# Patient Record
Sex: FEMALE | Race: WHITE | NOT HISPANIC OR LATINO | Employment: STUDENT | URBAN - METROPOLITAN AREA
[De-identification: names, ages, dates, MRNs, and addresses within clinical notes are randomized per-mention and may not be internally consistent; named-entity substitution may affect disease eponyms.]

---
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
## 2019-01-02 DIAGNOSIS — E03.9 HYPOTHYROIDISM, UNSPECIFIED TYPE: ICD-10-CM

## 2019-01-02 RX ORDER — LEVOTHYROXINE SODIUM 0.05 MG/1
50 TABLET ORAL DAILY
Qty: 30 TABLET | Refills: 0 | Status: SHIPPED | OUTPATIENT
Start: 2019-01-02 | End: 2019-02-04 | Stop reason: SDUPTHER

## 2019-01-02 NOTE — TELEPHONE ENCOUNTER
Outgoing call to  patient sent to dr Elisha Christianson  patient had left voicemail regarding script for levothyroxine  completely out of medication and cant find doctor close to her but is looking for pcp in [place]  Told her she needs apt and she said she cant get here  Doesn't drive and its too far
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
## 2019-02-04 ENCOUNTER — OFFICE VISIT (OUTPATIENT)
Dept: FAMILY MEDICINE CLINIC | Facility: CLINIC | Age: 19
End: 2019-02-04
Payer: COMMERCIAL

## 2019-02-04 VITALS
TEMPERATURE: 98.8 F | HEIGHT: 63 IN | OXYGEN SATURATION: 98 % | RESPIRATION RATE: 16 BRPM | DIASTOLIC BLOOD PRESSURE: 80 MMHG | WEIGHT: 157.6 LBS | BODY MASS INDEX: 27.93 KG/M2 | HEART RATE: 75 BPM | SYSTOLIC BLOOD PRESSURE: 120 MMHG

## 2019-02-04 DIAGNOSIS — E03.9 HYPOTHYROIDISM, UNSPECIFIED TYPE: ICD-10-CM

## 2019-02-04 DIAGNOSIS — Z30.011 INITIATION OF OCP (BCP): ICD-10-CM

## 2019-02-04 DIAGNOSIS — J30.2 SEASONAL ALLERGIC RHINITIS, UNSPECIFIED TRIGGER: ICD-10-CM

## 2019-02-04 DIAGNOSIS — J45.20 MILD INTERMITTENT ASTHMA WITHOUT COMPLICATION: Primary | ICD-10-CM

## 2019-02-04 PROBLEM — Z86.59 HISTORY OF PERSONALITY DISORDER: Status: ACTIVE | Noted: 2018-11-14

## 2019-02-04 PROBLEM — F12.90 USES MARIJUANA: Status: ACTIVE | Noted: 2018-11-14

## 2019-02-04 PROBLEM — T14.91XA SUICIDE ATTEMPT (HCC): Status: RESOLVED | Noted: 2018-11-14 | Resolved: 2019-02-04

## 2019-02-04 PROBLEM — F43.10 POST TRAUMATIC STRESS DISORDER (PTSD): Status: ACTIVE | Noted: 2018-11-14

## 2019-02-04 PROBLEM — F12.90 USES MARIJUANA: Status: RESOLVED | Noted: 2018-11-14 | Resolved: 2019-02-04

## 2019-02-04 PROBLEM — F33.2 MAJOR DEPRESSIVE DISORDER, RECURRENT EPISODE, SEVERE WITH MIXED FEATURES (HCC): Status: ACTIVE | Noted: 2018-11-14

## 2019-02-04 PROBLEM — T14.91XA SUICIDE ATTEMPT (HCC): Status: ACTIVE | Noted: 2018-11-14

## 2019-02-04 PROCEDURE — 99204 OFFICE O/P NEW MOD 45 MIN: CPT | Performed by: NURSE PRACTITIONER

## 2019-02-04 PROCEDURE — 3008F BODY MASS INDEX DOCD: CPT | Performed by: NURSE PRACTITIONER

## 2019-02-04 PROCEDURE — 3725F SCREEN DEPRESSION PERFORMED: CPT | Performed by: NURSE PRACTITIONER

## 2019-02-04 RX ORDER — LEVOTHYROXINE SODIUM 0.05 MG/1
50 TABLET ORAL DAILY
Qty: 90 TABLET | Refills: 1 | Status: SHIPPED | OUTPATIENT
Start: 2019-02-04 | End: 2019-03-14

## 2019-02-04 RX ORDER — NORETHINDRONE ACETATE AND ETHINYL ESTRADIOL 1.5-30(21)
1 KIT ORAL DAILY
Qty: 28 TABLET | Refills: 11 | Status: SHIPPED | OUTPATIENT
Start: 2019-02-04 | End: 2019-03-14

## 2019-02-04 RX ORDER — ALBUTEROL SULFATE 90 UG/1
2 AEROSOL, METERED RESPIRATORY (INHALATION) EVERY 6 HOURS PRN
Qty: 1 INHALER | Refills: 0 | Status: SHIPPED | OUTPATIENT
Start: 2019-02-04 | End: 2019-03-14

## 2019-02-26 ENCOUNTER — OFFICE VISIT (OUTPATIENT)
Dept: OBGYN CLINIC | Facility: CLINIC | Age: 19
End: 2019-02-26

## 2019-02-26 VITALS
HEIGHT: 64 IN | HEART RATE: 76 BPM | BODY MASS INDEX: 26.8 KG/M2 | WEIGHT: 157 LBS | DIASTOLIC BLOOD PRESSURE: 80 MMHG | SYSTOLIC BLOOD PRESSURE: 128 MMHG

## 2019-02-26 DIAGNOSIS — Z30.09 BIRTH CONTROL COUNSELING: ICD-10-CM

## 2019-02-26 DIAGNOSIS — Z11.3 SCREEN FOR SEXUALLY TRANSMITTED DISEASES: Primary | ICD-10-CM

## 2019-02-26 DIAGNOSIS — Z30.09 COUNSELING FOR BIRTH CONTROL REGARDING INTRAUTERINE DEVICE (IUD): ICD-10-CM

## 2019-02-26 LAB — SL AMB POCT URINE HCG: NEGATIVE

## 2019-02-26 PROCEDURE — 87491 CHLMYD TRACH DNA AMP PROBE: CPT | Performed by: OBSTETRICS & GYNECOLOGY

## 2019-02-26 PROCEDURE — 87591 N.GONORRHOEAE DNA AMP PROB: CPT | Performed by: OBSTETRICS & GYNECOLOGY

## 2019-02-26 PROCEDURE — 99213 OFFICE O/P EST LOW 20 MIN: CPT | Performed by: OBSTETRICS & GYNECOLOGY

## 2019-02-26 PROCEDURE — 81025 URINE PREGNANCY TEST: CPT | Performed by: OBSTETRICS & GYNECOLOGY

## 2019-02-26 NOTE — PROGRESS NOTES
Assessment        22year old G0 here to discuss IUD placement  Bipolar disorder  Hypothyroidism     Plan   Birth control counseling  -patient has been on OCPs and desires Mirena IUD placement-patient was extensively counseled on the risks of IUD placement as well as abnormal bleeding that could be experienced for up to 1 year  -patient is sexually active in a monogamous relationship  -GC/Chlamydia collected and pending  -UPT negative today  -patient advised to continue OCPs until IUD placement  -patient will follow up for IUD placement once she returns from vacation  -will then follow up for annual visit  Bipolar disorder  -managed by Psychiatry and psychology, which patient follow with  -currently no SI  Hypothyroid  -continue synthroid as managed by PCP      Karie      Madie Canas is a 22 y o  female who presents as a new patient here for birth control counseling  Periods are regular every 28-30 days, lasting 4 days  Dysmenorrhea:present prior to initiation of OCPs, currently on OCPs she does not experience any  Cyclic symptoms include irritability and moodiness  No intermenstrual bleeding, spotting, or discharge  The patient reports that there is not domestic violence in her life  Patient has been on OCPs since she was 14  She has a history of mood instability as well as PMDD prior to her period  She was placed on OCPs which have improved all of her symptoms  She desires a more effective method of contraception and desires the IUD  All of the other birth control options were discussed in detail as well as the major side effects of the IUD  We discussed abnormal bleeding for up to 1 year as well as the risk including but not limited to IUD migration, perforation, infection, ectopic pregnancy  Patient also was informed that this is a progesterone only contraceptive and she is currently on one containing estrogen   We discussed the option of leaving the IUD in place and adding OCPs if she develops a worsening in her mood  Current contraception: OCP (estrogen/progesterone)  History of abnormal Pap smear: no    History of abnormal lipids: no  Menstrual History:  OB History    None        Patient's last menstrual period was 02/19/2019 (exact date)  The following portions of the patient's history were reviewed and updated as appropriate: allergies, current medications, past family history, past medical history, past social history, past surgical history and problem list     Review of Systems  Pertinent items are noted in HPI        Objective      /80   Pulse 76   Ht 5' 4" (1 626 m)   Wt 71 2 kg (157 lb)   LMP 02/19/2019 (Exact Date)   BMI 26 95 kg/m²

## 2019-02-27 LAB
C TRACH DNA SPEC QL NAA+PROBE: NEGATIVE
N GONORRHOEA DNA SPEC QL NAA+PROBE: NEGATIVE

## 2019-03-11 ENCOUNTER — APPOINTMENT (OUTPATIENT)
Dept: LAB | Facility: CLINIC | Age: 19
End: 2019-03-11
Payer: COMMERCIAL

## 2019-03-11 ENCOUNTER — TRANSCRIBE ORDERS (OUTPATIENT)
Dept: LAB | Facility: CLINIC | Age: 19
End: 2019-03-11

## 2019-03-11 DIAGNOSIS — R53.83 FATIGUE, UNSPECIFIED TYPE: ICD-10-CM

## 2019-03-11 DIAGNOSIS — Z13.1 SCREENING FOR DIABETES MELLITUS: ICD-10-CM

## 2019-03-11 DIAGNOSIS — R53.83 FATIGUE, UNSPECIFIED TYPE: Primary | ICD-10-CM

## 2019-03-11 LAB
25(OH)D3 SERPL-MCNC: 31.7 NG/ML (ref 30–100)
ALBUMIN SERPL BCP-MCNC: 3.9 G/DL (ref 3.5–5)
ALP SERPL-CCNC: 35 U/L (ref 46–116)
ALT SERPL W P-5'-P-CCNC: 15 U/L (ref 12–78)
AST SERPL W P-5'-P-CCNC: 20 U/L (ref 5–45)
BILIRUB DIRECT SERPL-MCNC: 0.18 MG/DL (ref 0–0.2)
BILIRUB SERPL-MCNC: 0.66 MG/DL (ref 0.2–1)
PROT SERPL-MCNC: 7.4 G/DL (ref 6.4–8.2)
T3 SERPL-MCNC: 1.2 NG/ML (ref 0.6–1.8)
T4 SERPL-MCNC: 13.6 UG/DL (ref 4.7–13.3)
TSH SERPL DL<=0.05 MIU/L-ACNC: 3.46 UIU/ML (ref 0.46–3.98)

## 2019-03-11 PROCEDURE — 36415 COLL VENOUS BLD VENIPUNCTURE: CPT

## 2019-03-11 PROCEDURE — 84480 ASSAY TRIIODOTHYRONINE (T3): CPT

## 2019-03-11 PROCEDURE — 84443 ASSAY THYROID STIM HORMONE: CPT

## 2019-03-11 PROCEDURE — 82306 VITAMIN D 25 HYDROXY: CPT

## 2019-03-11 PROCEDURE — 80076 HEPATIC FUNCTION PANEL: CPT

## 2019-03-11 PROCEDURE — 82607 VITAMIN B-12: CPT

## 2019-03-11 PROCEDURE — 84436 ASSAY OF TOTAL THYROXINE: CPT

## 2019-03-12 LAB — VIT B12 SERPL-MCNC: 217 PG/ML (ref 100–900)

## 2019-03-14 ENCOUNTER — PROCEDURE VISIT (OUTPATIENT)
Dept: OBGYN CLINIC | Facility: CLINIC | Age: 19
End: 2019-03-14

## 2019-03-14 VITALS
DIASTOLIC BLOOD PRESSURE: 80 MMHG | WEIGHT: 160 LBS | BODY MASS INDEX: 27.31 KG/M2 | HEIGHT: 64 IN | SYSTOLIC BLOOD PRESSURE: 120 MMHG | HEART RATE: 74 BPM

## 2019-03-14 DIAGNOSIS — Z30.430 ENCOUNTER FOR INSERTION OF MIRENA IUD: Primary | ICD-10-CM

## 2019-03-14 PROBLEM — Z30.011 INITIATION OF OCP (BCP): Status: RESOLVED | Noted: 2018-10-23 | Resolved: 2019-03-14

## 2019-03-14 LAB — SL AMB POCT URINE HCG: NORMAL

## 2019-03-14 PROCEDURE — 99213 OFFICE O/P EST LOW 20 MIN: CPT | Performed by: OBSTETRICS & GYNECOLOGY

## 2019-03-14 PROCEDURE — 81025 URINE PREGNANCY TEST: CPT | Performed by: OBSTETRICS & GYNECOLOGY

## 2019-03-14 PROCEDURE — 58300 INSERT INTRAUTERINE DEVICE: CPT | Performed by: OBSTETRICS & GYNECOLOGY

## 2019-03-14 RX ORDER — NORETHINDRONE ACETATE AND ETHINYL ESTRADIOL 1.5-30(21)
1 KIT ORAL EVERY 24 HOURS
COMMUNITY
Start: 2017-12-19 | End: 2019-09-27 | Stop reason: ALTCHOICE

## 2019-03-14 RX ORDER — LEVOTHYROXINE SODIUM 0.05 MG/1
1 TABLET ORAL EVERY 24 HOURS
COMMUNITY
Start: 2018-04-13 | End: 2019-08-08 | Stop reason: SDUPTHER

## 2019-03-14 NOTE — PROGRESS NOTES
Assessment & Plan  20 y o   G0 sexually active here for Mirena IUD insertion  UPT negative  Informed consent obtained  Procedure uncomplicated  Back-up contraception for 2 weeks  RTC in 3 weeks for string check  Problem List Items Addressed This Visit     None      Visit Diagnoses     Encounter for insertion of mirena IUD    -  Primary    Relevant Medications    levonorgestrel (MIRENA) IUD 20 mcg/day (Start on 3/14/2019  4:30 PM)    Other Relevant Orders    POCT urine HCG (Completed)        ____________________________________________________________  Subjective  She is without complaint    Desires IUD for contraception  Sexually active in monogamous relationship  Denies STD's  Denies any smoking    Objective  /80 (BP Location: Left arm, Patient Position: Sitting, Cuff Size: Standard)   Pulse 74   Ht 5' 4" (1 626 m)   Wt 72 6 kg (160 lb)   LMP 02/19/2019 (Exact Date)   BMI 27 46 kg/m²      Patient's Active Problem List  Patient Active Problem List   Diagnosis    Acne    Allergic rhinitis    Asthma    Primary hypothyroidism    History of personality disorder    Major depressive disorder, recurrent episode, severe with mixed features ([address] )    Post traumatic stress disorder (PTSD)    Counseling for birth control regarding intrauterine device (IUD)     Iud insertions  Date/Time: 3/14/2019 4:24 PM  Performed by: Melody Gomez MD  Authorized by: Melody Gomez MD     Consent:     Consent obtained:  Written and verbal    Consent given by:  Patient    Procedure risks and benefits discussed: yes      Patient questions answered: yes      Patient agrees, verbalizes understanding, and wants to proceed: yes    Procedure:     Pelvic exam performed: yes      Negative urine pregnancy test: yes      Cervix cleaned and prepped: yes      Speculum placed in vagina: yes      Tenaculum applied to cervix: yes      Uterus sounded: yes      Uterus sound depth (cm):  7    IUD inserted with no complications: yes IUD type:  Mirena  Post-procedure:     Patient tolerated procedure well: yes      Patient will follow up after next period: yes

## 2019-04-11 ENCOUNTER — OFFICE VISIT (OUTPATIENT)
Dept: OBGYN CLINIC | Facility: CLINIC | Age: 19
End: 2019-04-11

## 2019-04-11 VITALS
WEIGHT: 155 LBS | BODY MASS INDEX: 26.46 KG/M2 | DIASTOLIC BLOOD PRESSURE: 67 MMHG | HEART RATE: 61 BPM | HEIGHT: 64 IN | SYSTOLIC BLOOD PRESSURE: 117 MMHG

## 2019-04-11 DIAGNOSIS — Z30.431 IUD CHECK UP: Primary | ICD-10-CM

## 2019-04-11 PROCEDURE — 99213 OFFICE O/P EST LOW 20 MIN: CPT | Performed by: OBSTETRICS & GYNECOLOGY

## 2019-04-11 RX ORDER — LAMOTRIGINE 25 MG/1
25 TABLET ORAL DAILY
COMMUNITY
End: 2019-07-10

## 2019-05-29 ENCOUNTER — TELEPHONE (OUTPATIENT)
Dept: BEHAVIORAL/MENTAL HEALTH CLINIC | Facility: CLINIC | Age: 19
End: 2019-05-29
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
## 2019-07-10 ENCOUNTER — OFFICE VISIT (OUTPATIENT)
Dept: PSYCHIATRY | Facility: CLINIC | Age: 19
End: 2019-07-10
Payer: COMMERCIAL

## 2019-07-10 VITALS
HEART RATE: 76 BPM | DIASTOLIC BLOOD PRESSURE: 73 MMHG | WEIGHT: 150 LBS | SYSTOLIC BLOOD PRESSURE: 105 MMHG | BODY MASS INDEX: 25.75 KG/M2

## 2019-07-10 DIAGNOSIS — F90.2 ADHD (ATTENTION DEFICIT HYPERACTIVITY DISORDER), COMBINED TYPE: Primary | ICD-10-CM

## 2019-07-10 PROBLEM — Z30.09 COUNSELING FOR BIRTH CONTROL REGARDING INTRAUTERINE DEVICE (IUD): Status: RESOLVED | Noted: 2019-02-26 | Resolved: 2019-07-10

## 2019-07-10 PROCEDURE — 90791 PSYCH DIAGNOSTIC EVALUATION: CPT | Performed by: NURSE PRACTITIONER

## 2019-07-10 RX ORDER — DEXTROAMPHETAMINE SACCHARATE, AMPHETAMINE ASPARTATE MONOHYDRATE, DEXTROAMPHETAMINE SULFATE AND AMPHETAMINE SULFATE 1.25; 1.25; 1.25; 1.25 MG/1; MG/1; MG/1; MG/1
5 CAPSULE, EXTENDED RELEASE ORAL EVERY MORNING
Qty: 60 CAPSULE | Refills: 0 | Status: SHIPPED | OUTPATIENT
Start: 2019-07-10 | End: 2019-11-06

## 2019-07-10 RX ORDER — LAMOTRIGINE 100 MG/1
100 TABLET ORAL DAILY
COMMUNITY
Start: 2019-05-14 | End: 2019-07-10

## 2019-08-08 DIAGNOSIS — E03.9 PRIMARY HYPOTHYROIDISM: Primary | ICD-10-CM

## 2019-08-08 RX ORDER — LEVOTHYROXINE SODIUM 0.05 MG/1
50 TABLET ORAL DAILY
Qty: 90 TABLET | Refills: 1 | Status: SHIPPED | OUTPATIENT
Start: 2019-08-08 | End: 2020-03-05

## 2019-08-19 ENCOUNTER — TELEPHONE (OUTPATIENT)
Dept: PSYCHIATRY | Facility: CLINIC | Age: 19
End: 2019-08-19

## 2019-08-20 ENCOUNTER — DOCUMENTATION (OUTPATIENT)
Dept: PSYCHIATRY | Facility: CLINIC | Age: 19
End: 2019-08-20

## 2019-08-20 NOTE — PROGRESS NOTES
Treatment Plan not completed within required time limits due to: Vandana Cho  cancelled appointment  on 8/21/2019

## 2019-09-09 NOTE — BH TREATMENT PLAN
TREATMENT PLAN (Medication Management Only)        [facility]    Name and Date of Birth:  Vandana Cho 20 y o  2000  Date of Treatment Plan: September 10, 2019  Diagnosis/Diagnoses:    1  ADHD (attention deficit hyperactivity disorder), combined type      Strengths/Personal Resources for Self-Care: taking medications as prescribed, ability to adapt to life changes, ability to communicate needs, ability to communicate well, ability to listen, ability to reason, ability to understand psychiatric illness, general fund of knowledge, good physical health, good understanding of illness, independence, motivation for treatment, willingness to work on problems  Area/Areas of need (in own words): ADHD symptoms  1  Long Term Goal: continue improvement in ADHD symptoms  Target Date: 2 months - 11/9/2019  Person/Persons responsible for completion of goal: Jami Lozano  2  Short Term Objective (s) - How will we reach this goal?:   A  Provider new recommended medication/dosage changes and/or continue medication(s): continue current medications as prescribed  B  Attend medication management appointments regularly  C  Take psychiatric medications responsibly  Target Date: 3 months - 12/9/2019  Person/Persons Responsible for Completion of Goal: Jami Lozano  Progress Towards Goals: continuing treatment  Treatment Modality: medication management every 6 weeks, medication education at every visit  Review due 90 to 120 days from date of this plan: 3 months - 12/9/2019  Expected length of service: ongoing treatment  My Physician/PA/NP and I have developed this plan together and I agree to work on the goals and objectives  I understand the treatment goals that were developed for my treatment
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
## 2019-09-10 ENCOUNTER — OFFICE VISIT (OUTPATIENT)
Dept: PSYCHIATRY | Facility: CLINIC | Age: 19
End: 2019-09-10

## 2019-09-10 DIAGNOSIS — F90.2 ADHD (ATTENTION DEFICIT HYPERACTIVITY DISORDER), COMBINED TYPE: Primary | ICD-10-CM

## 2019-09-10 PROCEDURE — 99213 OFFICE O/P EST LOW 20 MIN: CPT | Performed by: NURSE PRACTITIONER

## 2019-09-10 RX ORDER — DEXTROAMPHETAMINE SACCHARATE, AMPHETAMINE ASPARTATE MONOHYDRATE, DEXTROAMPHETAMINE SULFATE AND AMPHETAMINE SULFATE 2.5; 2.5; 2.5; 2.5 MG/1; MG/1; MG/1; MG/1
10 CAPSULE, EXTENDED RELEASE ORAL EVERY MORNING
Qty: 30 CAPSULE | Refills: 0 | Status: SHIPPED | OUTPATIENT
Start: 2019-09-10 | End: 2019-11-06 | Stop reason: SDUPTHER

## 2019-09-27 ENCOUNTER — OFFICE VISIT (OUTPATIENT)
Dept: FAMILY MEDICINE CLINIC | Facility: CLINIC | Age: 19
End: 2019-09-27
Payer: COMMERCIAL

## 2019-09-27 VITALS
RESPIRATION RATE: 16 BRPM | WEIGHT: 141 LBS | HEART RATE: 96 BPM | BODY MASS INDEX: 24.07 KG/M2 | OXYGEN SATURATION: 99 % | TEMPERATURE: 98.4 F | SYSTOLIC BLOOD PRESSURE: 110 MMHG | HEIGHT: 64 IN | DIASTOLIC BLOOD PRESSURE: 70 MMHG

## 2019-09-27 DIAGNOSIS — L70.0 ACNE VULGARIS: Primary | ICD-10-CM

## 2019-09-27 DIAGNOSIS — N93.0 BLEEDING AFTER INTERCOURSE: ICD-10-CM

## 2019-09-27 PROCEDURE — 99214 OFFICE O/P EST MOD 30 MIN: CPT | Performed by: FAMILY MEDICINE

## 2019-09-27 RX ORDER — ERYTHROMYCIN AND BENZOYL PEROXIDE 30; 50 MG/G; MG/G
GEL TOPICAL 2 TIMES DAILY
Qty: 23.3 G | Refills: 1 | Status: SHIPPED | OUTPATIENT
Start: 2019-09-27 | End: 2020-02-19 | Stop reason: ALTCHOICE

## 2019-09-27 RX ORDER — CLINDAMYCIN AND BENZOYL PEROXIDE 10; 50 MG/G; MG/G
GEL TOPICAL 2 TIMES DAILY
Qty: 25 G | Refills: 0 | Status: SHIPPED | OUTPATIENT
Start: 2019-09-27 | End: 2019-09-27 | Stop reason: CLARIF

## 2019-09-27 NOTE — PROGRESS NOTES
Assessment/Plan:    Acne  Will start topical acne treatment  Will also refer patient to dermatology, as she may be a candidate for Accutane treatment  Bleeding after intercourse  Recommend patient follow-up with her gynecologist for further evaluation and management of this issue  Diagnoses and all orders for this visit:    Acne vulgaris  -     Ambulatory referral to Dermatology; Future  -     Discontinue: clindamycin-benzoyl peroxide (BENZACLIN) gel; Apply topically 2 (two) times a day  -     benzoyl peroxide-erythromycin (BENZAMYCIN) gel; Apply topically 2 (two) times a day    Bleeding after intercourse          Subjective:      Patient ID: Karl Wooten is a 20 y o  female  Oil face with cystic acne--has been an issue since she was a teen; uses OTC acne treatment  Ingrown hairs in pubic area--tried shaving and waxing without improvement  Been on Retin-A in the past; saw dermatology as a teen; not on Accutane  Reports she does have trouble with acne on her back, but that has improved some since she has gotten older  Has IUD in place; notes to have bleeding after intercourse; happening for years; grandmother with history of uterine cancer      The following portions of the patient's history were reviewed and updated as appropriate: allergies, current medications, past family history, past medical history, past social history, past surgical history and problem list     Review of Systems   Constitutional: Negative for fever  Genitourinary: Positive for vaginal bleeding (after intercourse)  Skin: Positive for rash (acne)  Negative for wound  Objective:      /70 (BP Location: Left arm, Patient Position: Sitting, Cuff Size: Adult)   Pulse 96   Temp 98 4 °F (36 9 °C) (Oral)   Resp 16   Ht 5' 4" (1 626 m)   Wt 64 kg (141 lb)   SpO2 99%   BMI 24 20 kg/m²          Physical Exam   Constitutional: She is oriented to person, place, and time  She appears well-developed and well-nourished  She is cooperative  HENT:   Head: Normocephalic and atraumatic  Right Ear: Hearing and external ear normal    Left Ear: Hearing and external ear normal    Eyes: Conjunctivae and lids are normal    Cardiovascular: Normal rate  Pulmonary/Chest: Effort normal    Musculoskeletal: Normal range of motion  Neurological: She is alert and oriented to person, place, and time  She exhibits normal muscle tone  Gait normal    Skin: Skin is warm, dry and intact  Cystic acne noted on forehead; multiple open comedones noted on nose and chin; erythema around hair follicles in inguinal area; no current pustules noted   Psychiatric: She has a normal mood and affect  Her speech is normal and behavior is normal    Nursing note and vitals reviewed  Statement Selected

## 2019-09-27 NOTE — ASSESSMENT & PLAN NOTE
Will start topical acne treatment  Will also refer patient to dermatology, as she may be a candidate for Accutane treatment

## 2019-09-27 NOTE — PATIENT INSTRUCTIONS
Acne   AMBULATORY CARE:   Acne  is a skin condition that is common in adolescents  It usually gets better over time, and is usually gone by about age 22  Acne can continue into adulthood for some people  Different types of acne:  Acne most often appears on the face, neck, upper chest, back, and upper arms  · Whiteheads  are closed, white bumps that form when the pore is completely blocked  · Blackheads  are tiny, dark spots that form when the pore is blocked but stays open  · Pimples  are inflamed bumps that contain pus  They are often caused by clogged pores  Pimples develop when whiteheads or blackheads get infected  · Cystic acne  is made up of large inflamed nodules or cysts that contain pus  They look like large pimples and form deep inside the skin  They may cause pain and scars  Contact your healthcare provider if:   · You use retinoid medicine and you think you might be pregnant  · You use retinoid medicine and begin to have mood swings or personality changes  · You feel depressed  · You have a fever and inflammation of your skin  · Your acne does not get better, even after treatment  · You have questions or concerns about your condition or care  Treatment  depends on how severe your acne is  Your healthcare provider may recommend any of the following:  · Over-the-counter acne medicines  with benzoyl peroxide and salicylic acid may help to treat mild acne  They are available in the form of gels, lotions, creams, pads, or soaps  It may take several weeks for you to see an improvement  Follow the directions on the medicine label  Do not use more than directed  This medicine can cause dry and red skin if you use too much  · Prescription medicines  may be needed if over-the-counter medicines do not help after 2 months  You may need to take more than one kind of medicine to treat your acne  This may include antibiotics that kill bacteria and help decrease swelling   A type of prescription acne medicine called retinoids may cause serious birth defects  Do not  use this medicine if you are pregnant or may become pregnant  · Light therapy  may help decrease your acne  Ask your healthcare provider for more information about light therapy  Manage or prevent acne:   · Wash your face 2 times a day  with a gentle cleanser  This helps decrease oil buildup that leads to acne  Also wash your face if you have been sweating a lot, such as after exercise  · Use oil-free products  This includes sunscreen, moisturizers, and cosmetics  Hair products should also be oil-free  · Regularly wash your hair  to decrease oil  Oily hair that touches your face can increase acne  · Avoid touching your face  as much as possible  Do not pick, squeeze, or pop your pimples  This can make your acne worse because your hands contain oil  It can also cause scars to form on your face  · Avoid things that rub against your skin  as much as possible  This includes hats, helmets, and backpacks  Follow up with your healthcare provider as directed:  Write down your questions so you remember to ask them during your visits  © 2017 [employer]  Information is for End User's use only and may not be sold, redistributed or otherwise used for commercial purposes  All illustrations and images included in CareNotes® are the copyrighted property of [employer]  or [employer]  The above information is an  only  It is not intended as medical advice for individual conditions or treatments  Talk to your doctor, nurse or pharmacist before following any medical regimen to see if it is safe and effective for you

## 2019-09-27 NOTE — ASSESSMENT & PLAN NOTE
Recommend patient follow-up with her gynecologist for further evaluation and management of this issue

## 2019-10-04 ENCOUNTER — OFFICE VISIT (OUTPATIENT)
Dept: OBGYN CLINIC | Facility: CLINIC | Age: 19
End: 2019-10-04
Payer: COMMERCIAL

## 2019-10-04 VITALS
WEIGHT: 144 LBS | SYSTOLIC BLOOD PRESSURE: 104 MMHG | DIASTOLIC BLOOD PRESSURE: 66 MMHG | HEIGHT: 64 IN | BODY MASS INDEX: 24.59 KG/M2

## 2019-10-04 DIAGNOSIS — N93.0 PCB (POST COITAL BLEEDING): Primary | ICD-10-CM

## 2019-10-04 DIAGNOSIS — Z72.51 HIGH RISK HETEROSEXUAL BEHAVIOR: ICD-10-CM

## 2019-10-04 PROCEDURE — 99203 OFFICE O/P NEW LOW 30 MIN: CPT | Performed by: NURSE PRACTITIONER

## 2019-10-04 NOTE — PROGRESS NOTES
Hitesh Oleary 53-year-old nulliparous female  Her method of contraception is a Mirena IUD which was inserted in April of this year  Patient complains of bleeding with intercourse (spotting) shortly after intercourse which usually occurs once when she wipes  This has been occurring since she became sexually at age 12  She is properly lubricated  Denies pain with intercourse  Her menses are absent due to Mirena IUD  Sexual History: 30 total partners in her lifetime  One partner within this past year  Does not always use condoms  ROS:  As indicated in HPI  All other ROS negative  Physical Exam   Constitutional: Vital signs are normal  She appears well-developed and well-nourished  Genitourinary: Vagina normal and uterus normal  Pelvic exam was performed with patient supine  There is no rash, tenderness, lesion or injury on the right labia  There is no rash, tenderness, lesion or injury on the left labia  Right adnexum does not display mass, does not display tenderness and does not display fullness  Left adnexum does not display mass, does not display tenderness and does not display fullness  Cervix is nulliparous  Cervix exhibits friability and visible IUD strings  Cervix does not exhibit motion tenderness, lesion, discharge, polyp or nabothian cyst      Uterus is anteverted  HENT:   Head: Normocephalic and atraumatic  Neck: Neck supple  Neurological: She is alert  Skin: Skin is warm  Nursing note and vitals reviewed  Marisela Guy was seen today for vaginal bleeding  Diagnoses and all orders for this visit:    PCB (post coital bleeding)  -     US pelvis complete w transvaginal; Future    High risk heterosexual behavior      I explained that the bleeding is most likely occuring due to the friability of her cervix which causes it to easily bleed  I also discussed with the patient the importance of establishing with one gyn provider for continuity of care   Patient has been to a total of 4 different Gyn offices within our network including our office today, all within the last 9 months  She verbalized understanding  She is to follow-up in March after her 24st birthday for her first pap smear or sooner if she needs  I will call her with the results of her pelvic US

## 2019-10-11 ENCOUNTER — HOSPITAL ENCOUNTER (OUTPATIENT)
Dept: RADIOLOGY | Facility: HOSPITAL | Age: 19
Discharge: HOME | End: 2019-10-11
Payer: COMMERCIAL

## 2019-10-11 DIAGNOSIS — N93.0 PCB (POST COITAL BLEEDING): ICD-10-CM

## 2019-10-11 PROCEDURE — 76856 US EXAM PELVIC COMPLETE: CPT

## 2019-10-11 PROCEDURE — 76830 TRANSVAGINAL US NON-OB: CPT

## 2019-10-15 ENCOUNTER — TELEPHONE (OUTPATIENT)
Dept: OBGYN CLINIC | Facility: CLINIC | Age: 19
End: 2019-10-15

## 2019-10-16 NOTE — TELEPHONE ENCOUNTER
Pt informed pelvic U/S results 10/11/19 - wnl, IUD in place  Pt states she has persistent post-coital bleeding for "years"  She would like to speak with you
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
## 2019-10-22 ENCOUNTER — OFFICE VISIT (OUTPATIENT)
Dept: PSYCHIATRY | Facility: CLINIC | Age: 19
End: 2019-10-22
Payer: COMMERCIAL

## 2019-10-22 VITALS
HEART RATE: 92 BPM | WEIGHT: 141 LBS | SYSTOLIC BLOOD PRESSURE: 117 MMHG | DIASTOLIC BLOOD PRESSURE: 81 MMHG | BODY MASS INDEX: 24.2 KG/M2

## 2019-10-22 DIAGNOSIS — F29 PSYCHOSIS, UNSPECIFIED PSYCHOSIS TYPE (HCC): ICD-10-CM

## 2019-10-22 DIAGNOSIS — F90.1 ADHD, PREDOMINANTLY HYPERACTIVE TYPE: Primary | ICD-10-CM

## 2019-10-22 PROCEDURE — 99213 OFFICE O/P EST LOW 20 MIN: CPT | Performed by: NURSE PRACTITIONER

## 2019-10-22 NOTE — BH TREATMENT PLAN
TREATMENT PLAN (Medication Management Only)         [facility]     Name and Date of Birth:  Marvel Hilton 20 y o  2000  Date of Treatment Plan: October 22, 2019  Diagnosis/Diagnoses:    1  ADHD (attention deficit hyperactivity disorder), combined type       Strengths/Personal Resources for Self-Care: taking medications as prescribed, ability to adapt to life changes, ability to communicate needs, ability to communicate well, ability to listen, ability to reason, ability to understand psychiatric illness, general fund of knowledge, good physical health, good understanding of illness, independence, motivation for treatment, willingness to work on problems  Area/Areas of need (in own words): ADHD symptoms  1  Long Term Goal: continue improvement in ADHD symptoms  Target Date: 2 months - 12/21/2019  Person/Persons responsible for completion of goal: Claudia Mon  2  Short Term Objective (s) - How will we reach this goal?:   A  Provider new recommended medication/dosage changes and/or continue medication(s): continue current medications as prescribed  B  Attend medication management appointments regularly  C  Take psychiatric medications responsibly  Target Date: 3 months - 1/21/2020  Person/Persons Responsible for Completion of Goal: Claudia Mon  Progress Towards Goals: continuing treatment  Treatment Modality: medication management every 6 weeks, medication education at every visit  Review due 90 to 120 days from date of this plan: 3 months - 1/21/2020  Expected length of service: ongoing treatment  My Physician/PA/NP and I have developed this plan together and I agree to work on the goals and objectives  I understand the treatment goals that were developed for my treatment

## 2019-11-04 NOTE — TELEPHONE ENCOUNTER
Patient currently on Adderall 10 mg and 5 mg  Since last visit she states she is still having anxiety  She sent you an email unsure if you got it

## 2019-11-05 NOTE — TELEPHONE ENCOUNTER
Amphet-dextroamphet XR 10 mg ordered daily, #30 and XR 5 mg also ordered daily, but #60  Is there a reason it was prescribed for #60? Typically we request authorization for 30 days at a time

## 2019-11-06 ENCOUNTER — TELEPHONE (OUTPATIENT)
Dept: BEHAVIORAL/MENTAL HEALTH CLINIC | Facility: CLINIC | Age: 19
End: 2019-11-06

## 2019-11-06 ENCOUNTER — TELEPHONE (OUTPATIENT)
Dept: PSYCHIATRY | Facility: CLINIC | Age: 19
End: 2019-11-06

## 2019-11-06 DIAGNOSIS — L70.0 ACNE VULGARIS: ICD-10-CM

## 2019-11-06 DIAGNOSIS — F90.2 ADHD (ATTENTION DEFICIT HYPERACTIVITY DISORDER), COMBINED TYPE: ICD-10-CM

## 2019-11-06 RX ORDER — ERYTHROMYCIN AND BENZOYL PEROXIDE 30; 50 MG/G; MG/G
GEL TOPICAL 2 TIMES DAILY
Qty: 23.3 G | Refills: 1 | Status: CANCELLED | OUTPATIENT
Start: 2019-11-06

## 2019-11-06 RX ORDER — DEXTROAMPHETAMINE SACCHARATE, AMPHETAMINE ASPARTATE MONOHYDRATE, DEXTROAMPHETAMINE SULFATE AND AMPHETAMINE SULFATE 2.5; 2.5; 2.5; 2.5 MG/1; MG/1; MG/1; MG/1
10 CAPSULE, EXTENDED RELEASE ORAL EVERY MORNING
Qty: 30 CAPSULE | Refills: 0 | Status: CANCELLED | OUTPATIENT
Start: 2019-11-06 | End: 2019-12-06

## 2019-11-06 RX ORDER — DEXTROAMPHETAMINE SACCHARATE, AMPHETAMINE ASPARTATE MONOHYDRATE, DEXTROAMPHETAMINE SULFATE AND AMPHETAMINE SULFATE 1.25; 1.25; 1.25; 1.25 MG/1; MG/1; MG/1; MG/1
5 CAPSULE, EXTENDED RELEASE ORAL EVERY MORNING
Qty: 60 CAPSULE | Refills: 0 | Status: CANCELLED | OUTPATIENT
Start: 2019-11-06 | End: 2020-01-05

## 2019-11-06 RX ORDER — DEXTROAMPHETAMINE SACCHARATE, AMPHETAMINE ASPARTATE MONOHYDRATE, DEXTROAMPHETAMINE SULFATE AND AMPHETAMINE SULFATE 2.5; 2.5; 2.5; 2.5 MG/1; MG/1; MG/1; MG/1
10 CAPSULE, EXTENDED RELEASE ORAL EVERY MORNING
Qty: 30 CAPSULE | Refills: 0 | Status: SHIPPED | OUTPATIENT
Start: 2019-11-06 | End: 2019-12-26 | Stop reason: SDUPTHER

## 2019-11-06 RX ORDER — DEXTROAMPHETAMINE SACCHARATE, AMPHETAMINE ASPARTATE MONOHYDRATE, DEXTROAMPHETAMINE SULFATE AND AMPHETAMINE SULFATE 1.25; 1.25; 1.25; 1.25 MG/1; MG/1; MG/1; MG/1
5 CAPSULE, EXTENDED RELEASE ORAL EVERY MORNING
Qty: 30 CAPSULE | Refills: 0 | Status: SHIPPED | OUTPATIENT
Start: 2019-11-06 | End: 2019-11-26

## 2019-11-06 NOTE — TELEPHONE ENCOUNTER
Patient has a question on her medication  She received 2 prescriptions,  Is she taking both  Adderall 10mg and Adderall 5mg  She needs directions on how to take these medication

## 2019-11-06 NOTE — TELEPHONE ENCOUNTER
Sudhakar sent to wrong physician   Patient called to check on prior authorization for Rexulti and also requested  Her Adderall be refilled also

## 2019-11-08 NOTE — TELEPHONE ENCOUNTER
Following up on this message: In more recent Telephone Encounters, Marco is questioning receiving prescriptions for both strengths of Adderall  Could you please clarify?

## 2019-11-11 ENCOUNTER — TELEPHONE (OUTPATIENT)
Dept: PSYCHIATRY | Facility: CLINIC | Age: 19
End: 2019-11-11

## 2019-11-11 NOTE — TELEPHONE ENCOUNTER
Routing note from Bernard to [facility] on 11/10/19  "She is to take 10mg daily  The 5mg was ordered by mistake but she can use them also and just take 2 tablets "  Thanks,   Jerrell Nuñez has been trying to reach [facility] since 11/10/19 but she does not answer and her voice mail has been full  Nursing again called Marco this morning and still no answer and not able to leave VM as message stating it is full  Nursing will continue to try to reach Marco  Pollie Meigs and Sandra,  If Marco calls again, would you please inform her that Nursing has been trying to reach her but she is not answering her phone and her VM is full  Please ask if there is an alternate phone number we can use to contact her, or if she can make room in her VM  Thank you, Lacy Joy's and Sandra's information

## 2019-11-12 ENCOUNTER — TELEPHONE (OUTPATIENT)
Dept: BEHAVIORAL/MENTAL HEALTH CLINIC | Facility: CLINIC | Age: 19
End: 2019-11-12

## 2019-11-12 NOTE — TELEPHONE ENCOUNTER
Patient called once again and left another message  I tried her phone and once again mailbox is full so unable to leave message  I emailed patient with the email provided in her chart and gave her the nurses extensions

## 2019-11-12 NOTE — TELEPHONE ENCOUNTER
Prior authorization for Rexulti 0 5 mg daily submitted to Rip Chambers via [employer]

## 2019-11-12 NOTE — TELEPHONE ENCOUNTER
Received a call from Berto Schmidt at [facility]:  Isabel Joseph called there to have them contact the office  Nursing has been trying to return Doris's calls since 11/06/19, however her voice mail box has been full  I called her sister, Nael Segura (emergency contact): The nurse at [facility] office calling; trying to reach Isabel Joseph since last week; her mail box is full; ask her to call and gave nursing numbers

## 2019-11-13 NOTE — TELEPHONE ENCOUNTER
Received a faxed denial for Rexulti - needs to have tried one of their formulary medications  I reviewed their list and Silverio Corey reports taking Seroquel while living [place] in 2016  She said the medication was ineffective, she got suicidal and was hospitalized  Will resubmit with additional information

## 2019-11-13 NOTE — TELEPHONE ENCOUNTER
Patient left a message to let you know that she has cleared her voicemail box   Would you like me to call, or do you want to?

## 2019-11-13 NOTE — TELEPHONE ENCOUNTER
Radha Garzon returned the call and left a message - mail box has been cleared and can leave a message  I was able to speak with Radha Garzon - she was appreciative of nursing calling her sister  I reviewed a P A has been submitted and waiting for a decision  We reviewed dosing of Adderall and she understands the dose is 10 mg  She related she would be able to fill the 5 mg, 2 caps daily next month  I reviewed the confusion changing doses each month presents and asked that she call the nursing number for refills and the correct dose could be sent (providers can send 3 refills for a stimulant at one time)  Will notify Radha Garzon of insurance's decision

## 2019-11-14 ENCOUNTER — TELEPHONE (OUTPATIENT)
Dept: PSYCHIATRY | Facility: CLINIC | Age: 19
End: 2019-11-14

## 2019-11-14 NOTE — TELEPHONE ENCOUNTER
Pharmacy and patient made aware of prior authorization approval for Rexulti 0 5 mg 11/14/2019 through 11/14/2020

## 2019-11-19 ENCOUNTER — TELEPHONE (OUTPATIENT)
Dept: PSYCHIATRY | Facility: CLINIC | Age: 19
End: 2019-11-19

## 2019-11-19 NOTE — TELEPHONE ENCOUNTER
Elijah Aquino RN from [facility] called to let you know [facility] is currently hospitalized and was calling to make a follow up  In checking your schedule she was already scheduled for 11/26  She will be coming in for this appointment

## 2019-11-26 ENCOUNTER — TELEPHONE (OUTPATIENT)
Dept: PSYCHIATRY | Facility: CLINIC | Age: 19
End: 2019-11-26

## 2019-11-26 ENCOUNTER — OFFICE VISIT (OUTPATIENT)
Dept: PSYCHIATRY | Facility: CLINIC | Age: 19
End: 2019-11-26
Payer: COMMERCIAL

## 2019-11-26 DIAGNOSIS — F29 PSYCHOSIS, UNSPECIFIED PSYCHOSIS TYPE (HCC): Primary | ICD-10-CM

## 2019-11-26 PROCEDURE — 99213 OFFICE O/P EST LOW 20 MIN: CPT | Performed by: NURSE PRACTITIONER
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
## 2019-11-26 NOTE — TELEPHONE ENCOUNTER
Fax received from [facility] requesting a prior Kendall Birdy be done for Doris's new dose of Rexulti 2 mg  Previous dose was 0 5 mg and P A  Was approved for this dose  Dose changed by Charles Montez at [facility] apptmnt today  For Josephine's information

## 2019-11-29 ENCOUNTER — TELEPHONE (OUTPATIENT)
Dept: PSYCHIATRY | Facility: CLINIC | Age: 19
End: 2019-11-29

## 2019-11-29 NOTE — TELEPHONE ENCOUNTER
Nursing sent prior authorization for Rexulti 2 mg to [facility] via [employer]  Will await outcome of prior authorization

## 2019-11-29 NOTE — TELEPHONE ENCOUNTER
Nursing received approval prior authorization approval for Rexulti, message left for patient and pharmacy made aware

## 2019-12-02 ENCOUNTER — OFFICE VISIT (OUTPATIENT)
Dept: FAMILY MEDICINE CLINIC | Facility: CLINIC | Age: 19
End: 2019-12-02
Payer: COMMERCIAL

## 2019-12-02 ENCOUNTER — TELEPHONE (OUTPATIENT)
Dept: PSYCHIATRY | Facility: CLINIC | Age: 19
End: 2019-12-02

## 2019-12-02 VITALS
WEIGHT: 144 LBS | OXYGEN SATURATION: 98 % | HEART RATE: 93 BPM | RESPIRATION RATE: 17 BRPM | DIASTOLIC BLOOD PRESSURE: 78 MMHG | TEMPERATURE: 98.4 F | BODY MASS INDEX: 24.59 KG/M2 | HEIGHT: 64 IN | SYSTOLIC BLOOD PRESSURE: 120 MMHG

## 2019-12-02 DIAGNOSIS — J06.9 VIRAL UPPER RESPIRATORY TRACT INFECTION: Primary | ICD-10-CM

## 2019-12-02 DIAGNOSIS — Z23 ENCOUNTER FOR VACCINATION: ICD-10-CM

## 2019-12-02 DIAGNOSIS — J02.9 SORE THROAT: ICD-10-CM

## 2019-12-02 LAB — S PYO AG THROAT QL: NEGATIVE

## 2019-12-02 PROCEDURE — 90471 IMMUNIZATION ADMIN: CPT

## 2019-12-02 PROCEDURE — 90732 PPSV23 VACC 2 YRS+ SUBQ/IM: CPT

## 2019-12-02 PROCEDURE — 3008F BODY MASS INDEX DOCD: CPT | Performed by: FAMILY MEDICINE

## 2019-12-02 PROCEDURE — 87880 STREP A ASSAY W/OPTIC: CPT | Performed by: FAMILY MEDICINE

## 2019-12-02 PROCEDURE — 4004F PT TOBACCO SCREEN RCVD TLK: CPT | Performed by: FAMILY MEDICINE

## 2019-12-02 PROCEDURE — 99213 OFFICE O/P EST LOW 20 MIN: CPT | Performed by: FAMILY MEDICINE

## 2019-12-02 PROCEDURE — 90472 IMMUNIZATION ADMIN EACH ADD: CPT

## 2019-12-02 PROCEDURE — 90686 IIV4 VACC NO PRSV 0.5 ML IM: CPT

## 2019-12-02 RX ORDER — BENZONATATE 100 MG/1
100 CAPSULE ORAL 3 TIMES DAILY PRN
Qty: 20 CAPSULE | Refills: 0 | Status: SHIPPED | OUTPATIENT
Start: 2019-12-02 | End: 2020-02-06 | Stop reason: ALTCHOICE

## 2019-12-02 RX ORDER — FLUTICASONE PROPIONATE 50 MCG
1 SPRAY, SUSPENSION (ML) NASAL DAILY
Qty: 1 BOTTLE | Refills: 2 | Status: SHIPPED | OUTPATIENT
Start: 2019-12-02

## 2019-12-02 RX ORDER — BROMPHENIRAMINE MALEATE, PSEUDOEPHEDRINE HYDROCHLORIDE, AND DEXTROMETHORPHAN HYDROBROMIDE 2; 30; 10 MG/5ML; MG/5ML; MG/5ML
10 SYRUP ORAL 4 TIMES DAILY PRN
Qty: 180 ML | Refills: 0 | Status: SHIPPED | OUTPATIENT
Start: 2019-12-02 | End: 2020-02-06 | Stop reason: ALTCHOICE

## 2019-12-02 RX ORDER — AZITHROMYCIN 250 MG/1
250 TABLET, FILM COATED ORAL EVERY 24 HOURS
Qty: 6 TABLET | Refills: 0 | Status: SHIPPED | OUTPATIENT
Start: 2019-12-02 | End: 2019-12-07

## 2019-12-02 NOTE — PROGRESS NOTES
Assessment/Plan:    No problem-specific Assessment & Plan notes found for this encounter  Diagnoses and all orders for this visit:    Viral upper respiratory tract infection  -     brompheniramine-pseudoephedrine-DM 30-2-10 MG/5ML syrup; Take 10 mL by mouth 4 (four) times a day as needed for allergies  -     benzonatate (TESSALON PERLES) 100 mg capsule; Take 1 capsule (100 mg total) by mouth 3 (three) times a day as needed for cough  -     fluticasone (FLONASE) 50 mcg/act nasal spray; 1 spray into each nostril daily  -     azithromycin (ZITHROMAX) 250 mg tablet; Take 1 tablet (250 mg total) by mouth every 24 hours for 5 days Take 500mg on day 1, then 250mg daily for 4 days    Sore throat  -     POCT rapid strepA    Encounter for vaccination  -     influenza vaccine, 2823-6061, quadrivalent, 0 5 mL, preservative-free, for adult and pediatric patients 6 mos+ (AFLURIA, FLUARIX, FLULAVAL, FLUZONE)  -     PNEUMOCOCCAL POLYSACCHARIDE VACCINE 23-VALENT =>3YO SQ IM      Informed that she has a viral URI  Symptomatic mgmt with bromfed and tesselon perles prn cough  Take NSAID for sore throat and sinus pain  Use flonase for nasal turbinate inflammation  She cannot tolerate nasal saline  Because of sinus pressure educated pt that we would not treat a sinusitus unless it was severe and the duration did not improve after 3 weeks  She was given a [facility] script but will not fill unless she meets the criteria  She wants to have the flu shot and pneumonia (has asthma) today because it will be difficult for her to return  Given today  Subjective:      Patient ID: Slade Hurst is a 20 y o  female  About 2 weeks ago started to have a cough with sore throat  Cough has become productive of sputum, is worse at night and in the am  Symptoms are getting worse  Reports sinus pain which is bilateral  Difficulty hearing in both ears  She is taking robitussin with is not helping  Multiple sick contacts at school     Having night sweats but no fevers, nausea, vomiting  Feeling very fatigued  The patient has mild intermittent asthma but has not been using her inhaler  Also the patient has seasonal allergies but not in the winter  Sore Throat    Associated symptoms include congestion, coughing and diarrhea (once last night)  Pertinent negatives include no abdominal pain, ear pain, headaches, shortness of breath, trouble swallowing or vomiting  The following portions of the patient's history were reviewed and updated as appropriate: allergies, current medications, past family history, past medical history, past social history, past surgical history and problem list     Review of Systems   Constitutional: Negative for fever and unexpected weight change  HENT: Positive for congestion, postnasal drip, rhinorrhea, sinus pressure, sinus pain and sore throat  Negative for ear pain and trouble swallowing  Eyes: Negative for pain and visual disturbance  Respiratory: Positive for cough  Negative for chest tightness, shortness of breath and wheezing  Cardiovascular: Negative for chest pain  Gastrointestinal: Positive for diarrhea (once last night)  Negative for abdominal distention, abdominal pain, blood in stool, constipation, nausea and vomiting  Endocrine: Negative for polydipsia and polyuria  Genitourinary: Negative for dysuria and hematuria  Musculoskeletal: Negative for back pain and myalgias  Skin: Negative for rash  Neurological: Negative for syncope and headaches  Psychiatric/Behavioral: Negative for suicidal ideas  Objective:      /78 (BP Location: Left arm, Patient Position: Sitting, Cuff Size: Standard)   Pulse 93   Temp 98 4 °F (36 9 °C) (Tympanic)   Resp 17   Ht 5' 4" (1 626 m)   Wt 65 3 kg (144 lb)   SpO2 98%   BMI 24 72 kg/m²          Physical Exam   Constitutional: She is oriented to person, place, and time  She appears well-developed and well-nourished     HENT:   Head: Normocephalic and atraumatic  Right Ear: Hearing, tympanic membrane and external ear normal    Left Ear: Hearing, tympanic membrane and external ear normal    Nose: Mucosal edema and rhinorrhea present  Right sinus exhibits maxillary sinus tenderness  Left sinus exhibits maxillary sinus tenderness (L>R)  Mouth/Throat: Mucous membranes are normal  Posterior oropharyngeal erythema present  No oropharyngeal exudate  No tonsillar exudate  Eyes: Pupils are equal, round, and reactive to light  Conjunctivae and EOM are normal  No scleral icterus  Neck: Normal range of motion  Neck supple  Cardiovascular: Normal rate, regular rhythm and intact distal pulses  Exam reveals no gallop and no friction rub  No murmur heard  Pulmonary/Chest: Effort normal and breath sounds normal  No respiratory distress  She has no wheezes  She has no rales  Abdominal: Soft  Bowel sounds are normal  She exhibits no distension and no mass  There is no tenderness  There is no rebound  Musculoskeletal: Normal range of motion  She exhibits no edema  Lymphadenopathy:     She has cervical adenopathy (bilateral, mildly tender)  Neurological: She is alert and oriented to person, place, and time  Skin: Skin is warm and dry  Psychiatric: She has a normal mood and affect

## 2019-12-02 NOTE — PATIENT INSTRUCTIONS
Only fill zpac in 1 week if your sinus pain is getting worse or you are having fevers  Drink tea with honey  Use bromfed and tesselon as needed  Advil for sore throat

## 2019-12-26 DIAGNOSIS — F29 PSYCHOSIS, UNSPECIFIED PSYCHOSIS TYPE (HCC): ICD-10-CM

## 2019-12-26 DIAGNOSIS — F90.2 ADHD (ATTENTION DEFICIT HYPERACTIVITY DISORDER), COMBINED TYPE: ICD-10-CM

## 2019-12-26 RX ORDER — DEXTROAMPHETAMINE SACCHARATE, AMPHETAMINE ASPARTATE MONOHYDRATE, DEXTROAMPHETAMINE SULFATE AND AMPHETAMINE SULFATE 2.5; 2.5; 2.5; 2.5 MG/1; MG/1; MG/1; MG/1
10 CAPSULE, EXTENDED RELEASE ORAL EVERY MORNING
Qty: 30 CAPSULE | Refills: 0 | Status: SHIPPED | OUTPATIENT
Start: 2019-12-26 | End: 2020-01-17
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
## 2020-01-14 NOTE — BH TREATMENT PLAN
TREATMENT PLAN (Medication Management Only)         [facility]     Name and Date of Birth:  Doris Burrell 19 y o  2000  Date of Treatment Plan: January 17, 2020  Diagnosis/Diagnoses:    1  ADHD (attention deficit hyperactivity disorder), combined type       Strengths/Personal Resources for Self-Care: taking medications as prescribed, ability to adapt to life changes, ability to communicate needs, ability to communicate well, ability to listen, ability to reason, ability to understand psychiatric illness, general fund of knowledge, good physical health, good understanding of illness, independence, motivation for treatment, willingness to work on problems  Area/Areas of need (in own words): ADHD symptoms  1          Long Term Goal: continue improvement in ADHD symptoms, irrational emotions/thoughts and body dysmorphic disorder  Target Date: 2 months - 3/16/2020  Person/Persons responsible for completion of goal: Doris  2          Short Term Objective (s) - How will we reach this goal?:   A  Provider new recommended medication/dosage changes and/or continue medication(s): continue current medications as prescribed  B  Attend medication management appointments regularly  C  Continue with psychotherapy to have less repetitive and intrusive thoughts  Target Date: 3 months - 4/16/2020  Person/Persons Responsible for Completion of Goal: Doris  Progress Towards Goals: continuing treatment  Treatment Modality: medication management every 6 weeks, medication education at every visit  Review due 90 to 120 days from date of this plan: 4 months - 5/16/2020  Expected length of service: ongoing treatment  My Physician/PA/NP and I have developed this plan together and I agree to work on the goals and objectives   I understand the treatment goals that were developed for my treatment

## 2020-01-17 ENCOUNTER — OFFICE VISIT (OUTPATIENT)
Dept: PSYCHIATRY | Facility: CLINIC | Age: 20
End: 2020-01-17
Payer: COMMERCIAL

## 2020-01-17 DIAGNOSIS — F90.2 ADHD (ATTENTION DEFICIT HYPERACTIVITY DISORDER), COMBINED TYPE: Primary | ICD-10-CM

## 2020-01-17 PROCEDURE — 99213 OFFICE O/P EST LOW 20 MIN: CPT | Performed by: NURSE PRACTITIONER

## 2020-01-17 RX ORDER — DEXTROAMPHETAMINE SACCHARATE, AMPHETAMINE ASPARTATE MONOHYDRATE, DEXTROAMPHETAMINE SULFATE AND AMPHETAMINE SULFATE 2.5; 2.5; 2.5; 2.5 MG/1; MG/1; MG/1; MG/1
20 CAPSULE, EXTENDED RELEASE ORAL EVERY MORNING
Qty: 60 CAPSULE | Refills: 0 | Status: SHIPPED | OUTPATIENT
Start: 2020-01-17 | End: 2020-02-24 | Stop reason: SDUPTHER

## 2020-02-06 ENCOUNTER — OFFICE VISIT (OUTPATIENT)
Dept: FAMILY MEDICINE CLINIC | Facility: CLINIC | Age: 20
End: 2020-02-06
Payer: COMMERCIAL

## 2020-02-06 VITALS
TEMPERATURE: 98.9 F | WEIGHT: 147 LBS | HEIGHT: 64 IN | RESPIRATION RATE: 17 BRPM | SYSTOLIC BLOOD PRESSURE: 120 MMHG | OXYGEN SATURATION: 99 % | HEART RATE: 109 BPM | DIASTOLIC BLOOD PRESSURE: 76 MMHG | BODY MASS INDEX: 25.1 KG/M2

## 2020-02-06 DIAGNOSIS — Z13.220 SCREENING FOR HYPERLIPIDEMIA: ICD-10-CM

## 2020-02-06 DIAGNOSIS — Z00.00 ANNUAL PHYSICAL EXAM: Primary | ICD-10-CM

## 2020-02-06 DIAGNOSIS — N94.10 DYSPAREUNIA, FEMALE: ICD-10-CM

## 2020-02-06 LAB
SL AMB  POCT GLUCOSE, UA: NORMAL
SL AMB LEUKOCYTE ESTERASE,UA: NORMAL
SL AMB POCT BILIRUBIN,UA: NORMAL
SL AMB POCT BLOOD,UA: NORMAL
SL AMB POCT CLARITY,UA: CLEAR
SL AMB POCT COLOR,UA: YELLOW
SL AMB POCT KETONES,UA: NORMAL
SL AMB POCT NITRITE,UA: NORMAL
SL AMB POCT PH,UA: 7
SL AMB POCT SPECIFIC GRAVITY,UA: 1.01
SL AMB POCT URINE PROTEIN: NORMAL
SL AMB POCT UROBILINOGEN: 0.2

## 2020-02-06 PROCEDURE — 81002 URINALYSIS NONAUTO W/O SCOPE: CPT | Performed by: FAMILY MEDICINE

## 2020-02-06 PROCEDURE — 99395 PREV VISIT EST AGE 18-39: CPT | Performed by: FAMILY MEDICINE

## 2020-02-06 PROCEDURE — 87491 CHLMYD TRACH DNA AMP PROBE: CPT | Performed by: FAMILY MEDICINE

## 2020-02-06 PROCEDURE — 87591 N.GONORRHOEAE DNA AMP PROB: CPT | Performed by: FAMILY MEDICINE

## 2020-02-06 NOTE — PATIENT INSTRUCTIONS

## 2020-02-06 NOTE — ASSESSMENT & PLAN NOTE
BMI Counseling: Body mass index is 25 23 kg/m²  The BMI is above normal  Nutrition recommendations include reducing portion sizes, decreasing overall calorie intake and 3-5 servings of fruits/vegetables daily  Exercise recommendations include moderate aerobic physical activity for 150 minutes/week

## 2020-02-06 NOTE — PROGRESS NOTES
[facility]    NAME: Leland Phalen  AGE: 20 y o  SEX: female  : 2000     DATE: 2020     Assessment and Plan:     Problem List Items Addressed This Visit        Other    Dyspareunia, female     States she has had a friable cervix and spotting after sex for 2 years  Over the last month the postcoital bleeding has worsened  Will check cg/ct, urine dip, tsh and cbc  Denies SOB, fatigue or lightheadedness  Advised to schedule earlier appt with obgyn  U/A was negative  Relevant Orders    Chlamydia/GC amplified DNA by PCR    TSH, 3rd generation    CBC and differential    POCT urine dip (Completed)    Annual physical exam - Primary     BMI Counseling: Body mass index is 25 23 kg/m²  The BMI is above normal  Nutrition recommendations include reducing portion sizes, decreasing overall calorie intake and 3-5 servings of fruits/vegetables daily  Exercise recommendations include moderate aerobic physical activity for 150 minutes/week  Relevant Orders    HIV 1/2 AG-AB combo      Other Visit Diagnoses     Screening for hyperlipidemia        Relevant Orders    Lipid panel          Immunizations and preventive care screenings were discussed with patient today  Appropriate education was printed on patient's after visit summary  UTD on immunizations  Counseling:  Alcohol/drug use: discussed moderation in alcohol intake, the recommendations for healthy alcohol use, and avoidance of illicit drug use  Dental Health: discussed importance of regular tooth brushing, flossing, and dental visits  Sexual health: discussed sexually transmitted diseases, partner selection, use of condoms, avoidance of unintended pregnancy, and contraceptive alternatives  · Exercise: the importance of regular exercise/physical activity was discussed  Recommend exercise 3-5 times per week for at least 30 minutes  BMI Counseling:  Body mass index is 25 23 kg/m²  The BMI is above normal  Nutrition recommendations include decreasing portion sizes, encouraging healthy choices of fruits and vegetables and decreasing fast food intake  Exercise recommendations include moderate physical activity 150 minutes/week  Tobacco Cessation Counseling: Tobacco cessation counseling was provided  The patient is sincerely urged to quit consumption of tobacco  She is not ready to quit tobacco        Return in about 1 year (around 2/6/2021) for Annual physical      Chief Complaint:     Chief Complaint   Patient presents with    Follow-up      History of Present Illness:     Adult Annual Physical   Patient here for a comprehensive physical exam  The patient reports problems - bleeding after sex       Diet and Physical Activity  · Diet/Nutrition: well balanced diet  · Exercise: moderate cardiovascular exercise  Depression Screening  PHQ-9 Depression Screening    PHQ-9:    Frequency of the following problems over the past two weeks:            General Health  · Sleep: sleeps well  · Vision: goes for regular eye exams  · Dental: regular dental visits  /GYN Health  · Last menstrual period: doesn't get periods because of mirena  · Contraceptive method: IUD placement  · History of STDs?: no      Review of Systems:     Review of Systems   Constitutional: Negative for fever and unexpected weight change  HENT: Negative for ear pain, sore throat and trouble swallowing  Eyes: Negative for pain and visual disturbance  Respiratory: Negative for cough, chest tightness, shortness of breath and wheezing  Cardiovascular: Negative for chest pain  Gastrointestinal: Negative for abdominal distention, abdominal pain, blood in stool, constipation, diarrhea, nausea and vomiting  Endocrine: Negative for polydipsia and polyuria  Genitourinary: Positive for dyspareunia and vaginal bleeding   Negative for difficulty urinating, dysuria, frequency, genital sores, hematuria, urgency and vaginal discharge  Musculoskeletal: Negative for back pain and myalgias  Skin: Negative for rash  Neurological: Negative for syncope and headaches  Psychiatric/Behavioral: Negative for suicidal ideas        Past Medical History:     Past Medical History:   Diagnosis Date    ADHD (attention deficit hyperactivity disorder)     Borderline personality disorder ([place] [address] )     Depression     Fracture of carpal bones     Hypothyroidism     Hypothyroidism (acquired)     PTSD (post-traumatic stress disorder)     Suicide and self-inflicted injury ([place] [address] ) 02/1891    Suicide attempt ([place] [address] )       Past Surgical History:     Past Surgical History:   Procedure Laterality Date    WISDOM TOOTH EXTRACTION        Social History:     Social History     Socioeconomic History    Marital status: Single     Spouse name: None    Number of children: None    Years of education: None    Highest education level: None   Occupational History    None   Social Needs    Financial resource strain: Not very hard    Food insecurity:     Worry: Never true     Inability: Never true    Transportation needs:     Medical: No     Non-medical: No   Tobacco Use    Smoking status: Current Every Day Smoker    Smokeless tobacco: Current User    Tobacco comment: vaping   Substance and Sexual Activity    Alcohol use: No    Drug use: Yes     Types: Marijuana     Comment: Rarely    Sexual activity: Yes     Partners: Male     Birth control/protection: Condom Male, IUD   Lifestyle    Physical activity:     Days per week: 2 days     Minutes per session: 30 min    Stress: Very much   Relationships    Social connections:     Talks on phone: Patient refused     Gets together: Patient refused     Attends Denominational service: Patient refused     Active member of club or organization: Patient refused     Attends meetings of clubs or organizations: Patient refused     Relationship status: Patient refused    Intimate partner violence:     Fear of current or ex partner: No     Emotionally abused: No     Physically abused: No     Forced sexual activity: No   Other Topics Concern    None   Social History Narrative    Non- smoker     No known smoke exposure     caffeine use - 2 cups per day     Alcohol Socially     Seatbelt use     Single     Illicit drug use - [facility] -  2 x month  Lives with boyfriend     Number of siblings: 4    Relationship with siblings: good      Family History:     Family History   Problem Relation Age of Onset    Hypertension Family     Thyroid disease Family     Cancer Family     Heart disease Family     Diabetes Family     Hypertension Father     ADD / ADHD Father    Emaline Folds ADD / ADHD Mother     Bipolar disorder Mother     ADD / ADHD Sister     ADD / ADHD Brother     Bipolar disorder Paternal Uncle     Uterine cancer Maternal Grandmother     Cervical cancer Maternal Grandmother     ADD / ADHD Brother     Breast cancer Paternal Aunt     Skin cancer Paternal Uncle       Current Medications:     Current Outpatient Medications   Medication Sig Dispense Refill    amphetamine-dextroamphetamine (ADDERALL XR) 10 MG 24 hr capsule Take 2 capsules (20 mg total) by mouth every morningMax Daily Amount: 20 mg 60 capsule 0    benzoyl peroxide-erythromycin (BENZAMYCIN) gel Apply topically 2 (two) times a day 23 3 g 1    Brexpiprazole (REXULTI) 2 MG tablet Take 1 tablet (2 mg total) by mouth daily 90 tablet 1    fluticasone (FLONASE) 50 mcg/act nasal spray 1 spray into each nostril daily 1 Bottle 2    levonorgestrel (MIRENA) 20 MCG/24HR IUD 1 each by Intrauterine route once      levothyroxine (SYNTHROID) 50 mcg tablet Take 1 tablet (50 mcg total) by mouth daily 90 tablet 1     No current facility-administered medications for this visit         Allergies:     No Known Allergies   Physical Exam:     /76 (BP Location: Left arm, Patient Position: Sitting, Cuff Size: Standard)   Pulse (!) 109   Temp 98 9 °F (37 2 °C) (Tympanic)   Resp 17   Ht 5' 4" (1 626 m)   Wt 66 7 kg (147 lb)   SpO2 99%   BMI 25 23 kg/m²     Physical Exam   Constitutional: She is oriented to person, place, and time  She appears well-developed and well-nourished  HENT:   Head: Normocephalic and atraumatic  Right Ear: External ear normal    Left Ear: External ear normal    Mouth/Throat: No oropharyngeal exudate  Eyes: Pupils are equal, round, and reactive to light  Conjunctivae and EOM are normal  No scleral icterus  Neck: Normal range of motion  Neck supple  Cardiovascular: Normal rate, regular rhythm and intact distal pulses  Exam reveals no gallop and no friction rub  No murmur heard  Pulmonary/Chest: Effort normal and breath sounds normal  No respiratory distress  She has no wheezes  She has no rales  Abdominal: Soft  Bowel sounds are normal  She exhibits no distension and no mass  There is no tenderness  There is no rebound  Musculoskeletal: Normal range of motion  She exhibits no edema  Neurological: She is alert and oriented to person, place, and time  Skin: Skin is warm and dry  Psychiatric: She has a normal mood and affect         Charley Long MD   [address] [place] [address]

## 2020-02-07 LAB
C TRACH DNA SPEC QL NAA+PROBE: NEGATIVE
N GONORRHOEA DNA SPEC QL NAA+PROBE: NEGATIVE

## 2020-02-11 ENCOUNTER — TELEPHONE (OUTPATIENT)
Dept: PSYCHIATRY | Facility: CLINIC | Age: 20
End: 2020-02-11

## 2020-02-11 NOTE — TELEPHONE ENCOUNTER
Molina Santamaria called she would like to get in sooner if possible  She is sleeping  12 hours a day  She doesn't feel like she is here  She feels like she may also need an antidepressant  [phone]

## 2020-02-19 ENCOUNTER — OFFICE VISIT (OUTPATIENT)
Dept: FAMILY MEDICINE CLINIC | Facility: CLINIC | Age: 20
End: 2020-02-19
Payer: COMMERCIAL

## 2020-02-19 VITALS
HEIGHT: 64 IN | WEIGHT: 147.6 LBS | TEMPERATURE: 97.9 F | RESPIRATION RATE: 16 BRPM | SYSTOLIC BLOOD PRESSURE: 120 MMHG | OXYGEN SATURATION: 98 % | BODY MASS INDEX: 25.2 KG/M2 | DIASTOLIC BLOOD PRESSURE: 72 MMHG | HEART RATE: 99 BPM

## 2020-02-19 DIAGNOSIS — F41.1 GENERALIZED ANXIETY DISORDER: Primary | ICD-10-CM

## 2020-02-19 PROBLEM — F48.1: Status: ACTIVE | Noted: 2019-11-16

## 2020-02-19 PROBLEM — F33.9 EPISODE OF RECURRENT MAJOR DEPRESSIVE DISORDER (HCC): Status: ACTIVE | Noted: 2019-11-16

## 2020-02-19 PROCEDURE — 99214 OFFICE O/P EST MOD 30 MIN: CPT | Performed by: FAMILY MEDICINE

## 2020-02-19 PROCEDURE — 4004F PT TOBACCO SCREEN RCVD TLK: CPT | Performed by: FAMILY MEDICINE

## 2020-02-19 PROCEDURE — 3008F BODY MASS INDEX DOCD: CPT | Performed by: FAMILY MEDICINE

## 2020-02-19 RX ORDER — BUSPIRONE HYDROCHLORIDE 10 MG/1
10 TABLET ORAL 2 TIMES DAILY
Qty: 70 TABLET | Refills: 2 | Status: SHIPPED | OUTPATIENT
Start: 2020-02-19 | End: 2020-04-17

## 2020-02-19 RX ORDER — ALPRAZOLAM 0.5 MG/1
0.5 TABLET ORAL
Qty: 5 TABLET | Refills: 0 | Status: SHIPPED | OUTPATIENT
Start: 2020-02-19 | End: 2020-08-20

## 2020-02-19 NOTE — ASSESSMENT & PLAN NOTE
Given buspar to use 1/2 tablet twice a day for 3 days, then 1tab bid  Use xanax 1/2 tab as needed for panic attacks  I advised the patient that they should not drive or operate machinery while on this medication until they see how it affects them, as it could cause lethargy and mental cloudyness  I advised the patient to call our office if they experience any side effects or issues with the medication changes  The patient verbalized an understanding  Do not use every day  PDMP checked

## 2020-02-19 NOTE — PROGRESS NOTES
Assessment/Plan:    Generalized anxiety disorder  Given buspar to use 1/2 tablet twice a day for 3 days, then 1tab bid  Use xanax 1/2 tab as needed for panic attacks  I advised the patient that they should not drive or operate machinery while on this medication until they see how it affects them, as it could cause lethargy and mental cloudyness  I advised the patient to call our office if they experience any side effects or issues with the medication changes  The patient verbalized an understanding  Do not use every day  PDMP checked  Diagnoses and all orders for this visit:    Generalized anxiety disorder  -     ALPRAZolam (XANAX) 0 5 mg tablet; Take 1 tablet (0 5 mg total) by mouth daily at bedtime as needed for anxiety  -     busPIRone (BUSPAR) 10 mg tablet; Take 1 tablet (10 mg total) by mouth 2 (two) times a day          Subjective:      Patient ID: Elizabeth Krause is a 20 y o  female  Pt has a history of anxiety, major depression, PTSD  Goes to behavioral therapy and psychiatry  Currently she is on rexulti  Cannot get into psych for another 3 weeks and symptoms of anxiety are getting unmanageable  In the past clonopin helped a little bit but she wants to try a daily medication because symptoms are daily  She reports panic attacks that are infrequent       The following portions of the patient's history were reviewed and updated as appropriate:   She   Patient Active Problem List    Diagnosis Date Noted    Generalized anxiety disorder 02/19/2020    Dyspareunia, female 02/06/2020    Annual physical exam 02/06/2020    Depersonalization-derealization disorder ([facility] [address] ) 11/16/2019    Episode of recurrent major depressive disorder ([facility] [address] ) 11/16/2019    Bleeding after intercourse 09/27/2019    History of personality disorder 11/14/2018    PTSD (post-traumatic stress disorder) 11/14/2018    Primary hypothyroidism 04/02/2017    Acne 11/05/2015    Allergic rhinitis 11/05/2015    Asthma 11/05/2015     She has a past surgical history that includes [place] tooth extraction  Her family history includes ADD / ADHD in her brother, brother, father, mother, and sister; Bipolar disorder in her mother and paternal uncle; Breast cancer in her paternal aunt; Cancer in her family; Cervical cancer in her maternal grandmother; Diabetes in her family; Heart disease in her family; Hypertension in her family and father; Skin cancer in her paternal uncle; Thyroid disease in her family; Uterine cancer in her maternal grandmother  She  reports that she has been smoking  She uses smokeless tobacco  She reports that she has current or past drug history  Drug: Marijuana  She reports that she does not drink alcohol  Current Outpatient Medications   Medication Sig Dispense Refill    Brexpiprazole (REXULTI) 2 MG tablet Take 1 tablet (2 mg total) by mouth daily 90 tablet 1    fluticasone (FLONASE) 50 mcg/act nasal spray 1 spray into each nostril daily 1 Bottle 2    levonorgestrel (MIRENA) 20 MCG/24HR IUD 1 each by Intrauterine route once      levothyroxine (SYNTHROID) 50 mcg tablet Take 1 tablet (50 mcg total) by mouth daily 90 tablet 1    ALPRAZolam (XANAX) 0 5 mg tablet Take 1 tablet (0 5 mg total) by mouth daily at bedtime as needed for anxiety 5 tablet 0    amphetamine-dextroamphetamine (ADDERALL XR) 10 MG 24 hr capsule Take 2 capsules (20 mg total) by mouth every morningMax Daily Amount: 20 mg 60 capsule 0    busPIRone (BUSPAR) 10 mg tablet Take 1 tablet (10 mg total) by mouth 2 (two) times a day 70 tablet 2     No current facility-administered medications for this visit       Review of Systems   Constitutional: Negative for fever and unexpected weight change  HENT: Negative for ear pain, sore throat and trouble swallowing  Eyes: Negative for pain and visual disturbance  Respiratory: Negative for cough, chest tightness, shortness of breath and wheezing  Cardiovascular: Negative for chest pain     Gastrointestinal: Negative for abdominal distention, abdominal pain, blood in stool, constipation, diarrhea, nausea and vomiting  Endocrine: Negative for polydipsia and polyuria  Genitourinary: Negative for dysuria and hematuria  Musculoskeletal: Negative for back pain and myalgias  Skin: Negative for rash  Neurological: Negative for syncope and headaches  Psychiatric/Behavioral: Positive for hallucinations  Negative for suicidal ideas  The patient is nervous/anxious  PHQ-9 Depression Screening    PHQ-9:    Frequency of the following problems over the past two weeks:       Little interest or pleasure in doing things:  2 - more than half the days  Feeling down, depressed, or hopeless:  2 - more than half the days  Trouble falling or staying asleep, or sleeping too much:  3 - nearly every day  Feeling tired or having little energy:  3 - nearly every day  Poor appetite or overeating:  3 - nearly every day  Feeling bad about yourself - or that you are a failure or have let yourself or your family down:  0 - not at all  Trouble concentrating on things, such as reading the newspaper or watching television:  1 - several days  Moving or speaking so slowly that other people could have noticed  Or the opposite - being so fidgety or restless that you have been moving around a lot more than usual:  3 - nearly every day  Thoughts that you would be better off dead, or of hurting yourself in some way:  2 - more than half the days  PHQ-2 Score:  4  PHQ-9 Score:  19       MARCI-7 Flowsheet Screening      Most Recent Value   Over the last two weeks, how often have you been bothered by the following problems?     Feeling nervous, anxious, or on edge  3   Not being able to stop or control worrying  3   Worrying too much about different things  3   Trouble relaxing   3   Being so restless that it's hard to sit still  3   Becoming easily annoyed or irritable   3   Feeling afraid as if something awful might happen  3   How difficult have these problems made it for you to do your work, take care of things at home, or get along with other people? Extremely difficult   MARCI Score   21          Objective:      /72 (BP Location: Left arm, Patient Position: Sitting, Cuff Size: Adult)   Pulse 99   Temp 97 9 °F (36 6 °C) (Tympanic)   Resp 16   Ht 5' 4" (1 626 m)   Wt 67 kg (147 lb 9 6 oz)   SpO2 98%   BMI 25 34 kg/m²          Physical Exam   Constitutional: She is oriented to person, place, and time  She appears well-developed and well-nourished  HENT:   Head: Normocephalic and atraumatic  Right Ear: External ear normal    Left Ear: External ear normal    Mouth/Throat: No oropharyngeal exudate  Eyes: Pupils are equal, round, and reactive to light  Conjunctivae and EOM are normal  No scleral icterus  Neck: Normal range of motion  Neck supple  Cardiovascular: Normal rate, regular rhythm and intact distal pulses  Exam reveals no gallop and no friction rub  No murmur heard  Pulmonary/Chest: Effort normal and breath sounds normal  No respiratory distress  She has no wheezes  She has no rales  Musculoskeletal: Normal range of motion  She exhibits no edema  Neurological: She is alert and oriented to person, place, and time  Skin: Skin is warm and dry  Psychiatric: She has a normal mood and affect

## 2020-02-24 ENCOUNTER — APPOINTMENT (OUTPATIENT)
Dept: LAB | Facility: CLINIC | Age: 20
End: 2020-02-24
Payer: COMMERCIAL

## 2020-02-24 DIAGNOSIS — N94.10 DYSPAREUNIA, FEMALE: ICD-10-CM

## 2020-02-24 DIAGNOSIS — Z00.00 ANNUAL PHYSICAL EXAM: ICD-10-CM

## 2020-02-24 DIAGNOSIS — F29 PSYCHOSIS, UNSPECIFIED PSYCHOSIS TYPE (HCC): ICD-10-CM

## 2020-02-24 DIAGNOSIS — F90.2 ADHD (ATTENTION DEFICIT HYPERACTIVITY DISORDER), COMBINED TYPE: ICD-10-CM

## 2020-02-24 LAB
BASOPHILS # BLD AUTO: 0.06 THOUSANDS/ΜL (ref 0–0.1)
BASOPHILS NFR BLD AUTO: 1 % (ref 0–1)
EOSINOPHIL # BLD AUTO: 0.2 THOUSAND/ΜL (ref 0–0.61)
EOSINOPHIL NFR BLD AUTO: 2 % (ref 0–6)
ERYTHROCYTE [DISTWIDTH] IN BLOOD BY AUTOMATED COUNT: 12.7 % (ref 11.6–15.1)
HCT VFR BLD AUTO: 43.1 % (ref 34.8–46.1)
HGB BLD-MCNC: 14.3 G/DL (ref 11.5–15.4)
IMM GRANULOCYTES # BLD AUTO: 0.01 THOUSAND/UL (ref 0–0.2)
IMM GRANULOCYTES NFR BLD AUTO: 0 % (ref 0–2)
LYMPHOCYTES # BLD AUTO: 3.07 THOUSANDS/ΜL (ref 0.6–4.47)
LYMPHOCYTES NFR BLD AUTO: 35 % (ref 14–44)
MCH RBC QN AUTO: 30.1 PG (ref 26.8–34.3)
MCHC RBC AUTO-ENTMCNC: 33.2 G/DL (ref 31.4–37.4)
MCV RBC AUTO: 91 FL (ref 82–98)
MONOCYTES # BLD AUTO: 0.61 THOUSAND/ΜL (ref 0.17–1.22)
MONOCYTES NFR BLD AUTO: 7 % (ref 4–12)
NEUTROPHILS # BLD AUTO: 4.85 THOUSANDS/ΜL (ref 1.85–7.62)
NEUTS SEG NFR BLD AUTO: 55 % (ref 43–75)
NRBC BLD AUTO-RTO: 0 /100 WBCS
PLATELET # BLD AUTO: 309 THOUSANDS/UL (ref 149–390)
PMV BLD AUTO: 9.4 FL (ref 8.9–12.7)
RBC # BLD AUTO: 4.75 MILLION/UL (ref 3.81–5.12)
TSH SERPL DL<=0.05 MIU/L-ACNC: 2.43 UIU/ML (ref 0.46–3.98)
WBC # BLD AUTO: 8.8 THOUSAND/UL (ref 4.31–10.16)

## 2020-02-24 PROCEDURE — 85025 COMPLETE CBC W/AUTO DIFF WBC: CPT

## 2020-02-24 PROCEDURE — 84443 ASSAY THYROID STIM HORMONE: CPT

## 2020-02-24 PROCEDURE — 36415 COLL VENOUS BLD VENIPUNCTURE: CPT

## 2020-02-24 PROCEDURE — 87389 HIV-1 AG W/HIV-1&-2 AB AG IA: CPT

## 2020-02-26 LAB — HIV 1+2 AB+HIV1 P24 AG SERPL QL IA: NORMAL

## 2020-02-27 ENCOUNTER — APPOINTMENT (OUTPATIENT)
Dept: LAB | Facility: CLINIC | Age: 20
End: 2020-02-27
Payer: COMMERCIAL

## 2020-02-27 DIAGNOSIS — Z13.220 SCREENING FOR HYPERLIPIDEMIA: ICD-10-CM

## 2020-02-27 LAB
CHOLEST SERPL-MCNC: 139 MG/DL (ref 50–200)
HDLC SERPL-MCNC: 62 MG/DL
LDLC SERPL CALC-MCNC: 65 MG/DL (ref 0–100)
NONHDLC SERPL-MCNC: 77 MG/DL
TRIGL SERPL-MCNC: 60 MG/DL

## 2020-02-27 PROCEDURE — 36415 COLL VENOUS BLD VENIPUNCTURE: CPT

## 2020-02-27 PROCEDURE — 80061 LIPID PANEL: CPT

## 2020-02-27 RX ORDER — DEXTROAMPHETAMINE SACCHARATE, AMPHETAMINE ASPARTATE MONOHYDRATE, DEXTROAMPHETAMINE SULFATE AND AMPHETAMINE SULFATE 2.5; 2.5; 2.5; 2.5 MG/1; MG/1; MG/1; MG/1
20 CAPSULE, EXTENDED RELEASE ORAL EVERY MORNING
Qty: 60 CAPSULE | Refills: 0 | Status: SHIPPED | OUTPATIENT
Start: 2020-02-27 | End: 2020-02-28 | Stop reason: SDUPTHER

## 2020-02-28 DIAGNOSIS — F90.2 ADHD (ATTENTION DEFICIT HYPERACTIVITY DISORDER), COMBINED TYPE: ICD-10-CM

## 2020-02-28 RX ORDER — DEXTROAMPHETAMINE SACCHARATE, AMPHETAMINE ASPARTATE MONOHYDRATE, DEXTROAMPHETAMINE SULFATE AND AMPHETAMINE SULFATE 2.5; 2.5; 2.5; 2.5 MG/1; MG/1; MG/1; MG/1
20 CAPSULE, EXTENDED RELEASE ORAL EVERY MORNING
Qty: 60 CAPSULE | Refills: 0 | Status: SHIPPED | OUTPATIENT
Start: 2020-02-28 | End: 2020-04-17 | Stop reason: SDUPTHER

## 2020-03-05 DIAGNOSIS — E03.9 PRIMARY HYPOTHYROIDISM: ICD-10-CM

## 2020-03-05 RX ORDER — LEVOTHYROXINE SODIUM 0.05 MG/1
TABLET ORAL
Qty: 90 TABLET | Refills: 1 | Status: SHIPPED | OUTPATIENT
Start: 2020-03-05 | End: 2020-09-02 | Stop reason: SDUPTHER
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
## 2020-03-12 ENCOUNTER — OFFICE VISIT (OUTPATIENT)
Dept: PSYCHIATRY | Facility: CLINIC | Age: 20
End: 2020-03-12
Payer: COMMERCIAL

## 2020-03-12 DIAGNOSIS — F33.1 MODERATE EPISODE OF RECURRENT MAJOR DEPRESSIVE DISORDER (HCC): Primary | ICD-10-CM

## 2020-03-12 PROCEDURE — 99213 OFFICE O/P EST LOW 20 MIN: CPT | Performed by: NURSE PRACTITIONER

## 2020-03-12 RX ORDER — BUPROPION HYDROCHLORIDE 150 MG/1
150 TABLET ORAL DAILY
Qty: 90 TABLET | Refills: 0 | Status: SHIPPED | OUTPATIENT
Start: 2020-03-12 | End: 2020-06-03

## 2020-03-20 ENCOUNTER — ANNUAL EXAM (OUTPATIENT)
Dept: OBGYN CLINIC | Facility: CLINIC | Age: 20
End: 2020-03-20
Payer: COMMERCIAL

## 2020-03-20 VITALS
SYSTOLIC BLOOD PRESSURE: 108 MMHG | BODY MASS INDEX: 25.95 KG/M2 | HEIGHT: 64 IN | WEIGHT: 152 LBS | DIASTOLIC BLOOD PRESSURE: 68 MMHG

## 2020-03-20 DIAGNOSIS — L70.0 CYSTIC ACNE: ICD-10-CM

## 2020-03-20 DIAGNOSIS — Z01.419 WOMEN'S ANNUAL ROUTINE GYNECOLOGICAL EXAMINATION: Primary | ICD-10-CM

## 2020-03-20 PROCEDURE — 99395 PREV VISIT EST AGE 18-39: CPT | Performed by: NURSE PRACTITIONER

## 2020-03-20 PROCEDURE — G0145 SCR C/V CYTO,THINLAYER,RESCR: HCPCS | Performed by: NURSE PRACTITIONER

## 2020-03-20 NOTE — PROGRESS NOTES
Subjective    HPI:     Ashanti Malcolm is a 23 y o  nulliparous female  Her menstrual cycles are absent  Her current method of contraception includes Mirena IUD  She complains of issues with intimacy  She complains of decrease libido  She is also on two antidepressants which I discussed have the potential of causing decrease libido  She denies /GI and Gyn complaints  She complains of cystic acne on her back and face  She feels safe at home  She complains of depression/anxiety  Medical, surgical and family history reviewed  Her dental care is up-to-date  She eats a healthy diet  She is happy with her weight  Gynecologic History    Patient's last menstrual period was 2019 (approximate)  Gardasil Vaccine Series: completed    Obstetric History    OB History    Para Term  AB Living   0 0 0 0 0 0   SAB TAB Ectopic Multiple Live Births   0 0 0 0 0       The following portions of the patient's history were reviewed and updated as appropriate: allergies, current medications, past family history, past medical history, past social history, past surgical history and problem list     Review of Systems    Pertinent items are noted in HPI  Objective    Physical Exam   Constitutional: Vital signs are normal  She appears well-developed and well-nourished  Genitourinary: Vagina normal and uterus normal  Pelvic exam was performed with patient supine  There is no rash, tenderness, lesion or Bartholin's cyst on the right labia  There is no rash, tenderness, lesion or Bartholin's cyst on the left labia  Right adnexum does not display mass, does not display tenderness and does not display fullness  Left adnexum does not display mass, does not display tenderness and does not display fullness  Cervix is nulliparous  Cervix exhibits visible IUD strings  Cervix does not exhibit motion tenderness, lesion, discharge, friability, polyp or nabothian cyst      Uterus is anteverted     HENT:   Head: Normocephalic and atraumatic  Neck: Neck supple  No thyromegaly present  Cardiovascular: Normal rate, regular rhythm, S1 normal, S2 normal and normal heart sounds  Pulmonary/Chest: Effort normal and breath sounds normal  Right breast exhibits no inverted nipple, no mass, no nipple discharge, no skin change and no tenderness  Left breast exhibits no inverted nipple, no mass, no nipple discharge, no skin change and no tenderness  Abdominal: Soft  Bowel sounds are normal  She exhibits no distension and no mass  There is no tenderness  There is no guarding  Lymphadenopathy:     She has no cervical adenopathy  She has no axillary adenopathy  Neurological: She is alert  Skin: Skin is warm  Cystic acne noted on back and face, mild  Psychiatric: She has a normal mood and affect  Nursing note and vitals reviewed  Assessment and Plan    Molina Santamaria was seen today for gynecologic exam     Diagnoses and all orders for this visit:    Women's annual routine gynecological examination  -     Cancel: Liquid-based pap, screening  -     Liquid-based pap, screening    Cystic acne  -     Ambulatory referral to Dermatology; Future      Patient informed of a Stable GYN exam  A pap smear was performed  I have discussed the importance of exercise and healthy diet as well as adequate intake of calcium and vitamin D  The current ASCCP guidelines were reviewed  The low risk patient will receive pap smear screening every 3 years until the age of 34 and then every 3 to 5 years with HPV co-testing from the ages of 33-67  I emphasized the importance of an annual pelvic and breast exam  A yearly mammogram is recommended for breast cancer screening starting at age 36  All questions have been answered to her satisfaction  Follow up in: 1 year or sooner if needed

## 2020-03-25 ENCOUNTER — TELEPHONE (OUTPATIENT)
Dept: DERMATOLOGY | Facility: CLINIC | Age: 20
End: 2020-03-25

## 2020-03-25 LAB
LAB AP GYN PRIMARY INTERPRETATION: NORMAL
LAB AP LMP: NORMAL
Lab: NORMAL

## 2020-04-01 ENCOUNTER — TELEMEDICINE (OUTPATIENT)
Dept: DERMATOLOGY | Facility: CLINIC | Age: 20
End: 2020-04-01
Payer: COMMERCIAL

## 2020-04-01 VITALS — BODY MASS INDEX: 25.27 KG/M2 | HEIGHT: 64 IN | WEIGHT: 148 LBS

## 2020-04-01 DIAGNOSIS — B37.9 CANDIDOSIS: Primary | ICD-10-CM

## 2020-04-01 DIAGNOSIS — L70.0 CYSTIC ACNE: ICD-10-CM

## 2020-04-01 PROCEDURE — 99203 OFFICE O/P NEW LOW 30 MIN: CPT | Performed by: DERMATOLOGY

## 2020-04-01 PROCEDURE — 3008F BODY MASS INDEX DOCD: CPT | Performed by: NURSE PRACTITIONER

## 2020-04-01 RX ORDER — SPIRONOLACTONE 25 MG/1
25 TABLET ORAL DAILY
Qty: 90 TABLET | Refills: 2 | Status: SHIPPED | OUTPATIENT
Start: 2020-04-01 | End: 2020-07-21 | Stop reason: SDUPTHER

## 2020-04-01 RX ORDER — FLUCONAZOLE 150 MG/1
150 TABLET ORAL ONCE
Qty: 1 TABLET | Refills: 2 | Status: SHIPPED | OUTPATIENT
Start: 2020-04-01 | End: 2020-04-01

## 2020-04-01 RX ORDER — MINOCYCLINE HYDROCHLORIDE 100 MG/1
100 TABLET ORAL 2 TIMES DAILY
Qty: 60 TABLET | Refills: 1 | Status: SHIPPED | OUTPATIENT
Start: 2020-04-01 | End: 2020-05-11

## 2020-04-01 RX ORDER — ADAPALENE 0.1 G/100G
CREAM TOPICAL
Qty: 45 G | Refills: 0 | Status: SHIPPED | OUTPATIENT
Start: 2020-04-01 | End: 2020-05-19

## 2020-04-03 ENCOUNTER — TELEPHONE (OUTPATIENT)
Dept: DERMATOLOGY | Facility: CLINIC | Age: 20
End: 2020-04-03

## 2020-04-17 ENCOUNTER — TELEMEDICINE (OUTPATIENT)
Dept: PSYCHIATRY | Facility: CLINIC | Age: 20
End: 2020-04-17
Payer: COMMERCIAL

## 2020-04-17 DIAGNOSIS — F29 PSYCHOSIS, UNSPECIFIED PSYCHOSIS TYPE (HCC): ICD-10-CM

## 2020-04-17 DIAGNOSIS — F90.2 ADHD (ATTENTION DEFICIT HYPERACTIVITY DISORDER), COMBINED TYPE: Primary | ICD-10-CM

## 2020-04-17 DIAGNOSIS — F41.1 GENERALIZED ANXIETY DISORDER: ICD-10-CM

## 2020-04-17 PROCEDURE — 99213 OFFICE O/P EST LOW 20 MIN: CPT | Performed by: NURSE PRACTITIONER

## 2020-04-17 RX ORDER — BUSPIRONE HYDROCHLORIDE 10 MG/1
20 TABLET ORAL 2 TIMES DAILY
Qty: 70 TABLET | Refills: 2 | Status: SHIPPED | OUTPATIENT
Start: 2020-04-17 | End: 2020-08-20

## 2020-04-17 RX ORDER — DEXTROAMPHETAMINE SACCHARATE, AMPHETAMINE ASPARTATE MONOHYDRATE, DEXTROAMPHETAMINE SULFATE AND AMPHETAMINE SULFATE 2.5; 2.5; 2.5; 2.5 MG/1; MG/1; MG/1; MG/1
20 CAPSULE, EXTENDED RELEASE ORAL EVERY MORNING
Qty: 60 CAPSULE | Refills: 0 | Status: SHIPPED | OUTPATIENT
Start: 2020-04-17 | End: 2020-06-11 | Stop reason: SDUPTHER

## 2020-05-05 ENCOUNTER — TELEPHONE (OUTPATIENT)
Dept: PSYCHIATRY | Facility: CLINIC | Age: 20
End: 2020-05-05

## 2020-05-07 DIAGNOSIS — F29 PSYCHOSIS, UNSPECIFIED PSYCHOSIS TYPE (HCC): ICD-10-CM

## 2020-05-11 ENCOUNTER — TELEMEDICINE (OUTPATIENT)
Dept: DERMATOLOGY | Facility: CLINIC | Age: 20
End: 2020-05-11
Payer: COMMERCIAL

## 2020-05-11 DIAGNOSIS — L70.0 ACNE VULGARIS: Primary | ICD-10-CM

## 2020-05-11 DIAGNOSIS — L70.0 NODULOCYSTIC ACNE: ICD-10-CM

## 2020-05-11 PROCEDURE — 99214 OFFICE O/P EST MOD 30 MIN: CPT | Performed by: DERMATOLOGY

## 2020-05-11 RX ORDER — DOXYCYCLINE HYCLATE 100 MG/1
CAPSULE ORAL
Qty: 60 CAPSULE | Refills: 3 | Status: SHIPPED | OUTPATIENT
Start: 2020-05-11 | End: 2020-11-16

## 2020-05-18 DIAGNOSIS — L70.0 CYSTIC ACNE: ICD-10-CM

## 2020-05-19 DIAGNOSIS — L70.0 CYSTIC ACNE: ICD-10-CM

## 2020-05-19 RX ORDER — ADAPALENE 0.1 %
CREAM (GRAM) TOPICAL
Qty: 45 G | Refills: 3 | Status: SHIPPED | OUTPATIENT
Start: 2020-05-19 | End: 2020-05-19

## 2020-05-19 RX ORDER — ADAPALENE 0.1 G/100G
CREAM TOPICAL
Qty: 45 G | Refills: 3 | Status: SHIPPED | OUTPATIENT
Start: 2020-05-19 | End: 2020-06-01

## 2020-06-01 DIAGNOSIS — L70.0 NODULOCYSTIC ACNE: Primary | ICD-10-CM

## 2020-06-01 RX ORDER — ADAPALENE AND BENZOYL PEROXIDE .1; 2.5 G/100G; G/100G
1 GEL TOPICAL
Qty: 45 G | Refills: 3 | Status: SHIPPED | OUTPATIENT
Start: 2020-06-01 | End: 2020-07-21 | Stop reason: DRUGHIGH

## 2020-06-03 DIAGNOSIS — F33.1 MODERATE EPISODE OF RECURRENT MAJOR DEPRESSIVE DISORDER (HCC): ICD-10-CM

## 2020-06-03 RX ORDER — BUPROPION HYDROCHLORIDE 150 MG/1
TABLET ORAL
Qty: 30 TABLET | Refills: 2 | Status: SHIPPED | OUTPATIENT
Start: 2020-06-03 | End: 2020-06-12

## 2020-06-11 DIAGNOSIS — F90.2 ADHD (ATTENTION DEFICIT HYPERACTIVITY DISORDER), COMBINED TYPE: ICD-10-CM

## 2020-06-11 RX ORDER — DEXTROAMPHETAMINE SACCHARATE, AMPHETAMINE ASPARTATE MONOHYDRATE, DEXTROAMPHETAMINE SULFATE AND AMPHETAMINE SULFATE 2.5; 2.5; 2.5; 2.5 MG/1; MG/1; MG/1; MG/1
20 CAPSULE, EXTENDED RELEASE ORAL EVERY MORNING
Qty: 60 CAPSULE | Refills: 0 | Status: SHIPPED | OUTPATIENT
Start: 2020-06-11 | End: 2020-07-28 | Stop reason: SDUPTHER

## 2020-06-12 ENCOUNTER — TELEMEDICINE (OUTPATIENT)
Dept: PSYCHIATRY | Facility: CLINIC | Age: 20
End: 2020-06-12
Payer: COMMERCIAL

## 2020-06-12 DIAGNOSIS — F29 PSYCHOSIS, UNSPECIFIED PSYCHOSIS TYPE (HCC): ICD-10-CM

## 2020-06-12 DIAGNOSIS — F29 PSYCHOSIS, UNSPECIFIED PSYCHOSIS TYPE (HCC): Primary | ICD-10-CM

## 2020-06-12 PROCEDURE — 99213 OFFICE O/P EST LOW 20 MIN: CPT | Performed by: NURSE PRACTITIONER

## 2020-06-12 RX ORDER — CARBAMAZEPINE 100 MG/1
100 TABLET, EXTENDED RELEASE ORAL 2 TIMES DAILY
Qty: 180 TABLET | Refills: 0 | Status: SHIPPED | OUTPATIENT
Start: 2020-06-12 | End: 2020-08-20

## 2020-06-18 RX ORDER — BREXPIPRAZOLE 2 MG/1
TABLET ORAL
Qty: 90 TABLET | Refills: 1 | Status: SHIPPED | OUTPATIENT
Start: 2020-06-18 | End: 2020-08-20

## 2020-06-19 ENCOUNTER — TELEPHONE (OUTPATIENT)
Dept: PSYCHIATRY | Facility: CLINIC | Age: 20
End: 2020-06-19

## 2020-06-19 DIAGNOSIS — F41.1 GENERALIZED ANXIETY DISORDER: ICD-10-CM

## 2020-06-19 RX ORDER — BUSPIRONE HYDROCHLORIDE 10 MG/1
TABLET ORAL
Qty: 60 TABLET | Refills: 3 | OUTPATIENT
Start: 2020-06-19

## 2020-07-21 ENCOUNTER — TELEMEDICINE (OUTPATIENT)
Dept: DERMATOLOGY | Facility: CLINIC | Age: 20
End: 2020-07-21
Payer: COMMERCIAL

## 2020-07-21 DIAGNOSIS — L70.0 CYSTIC ACNE: ICD-10-CM

## 2020-07-21 DIAGNOSIS — L70.0 ACNE VULGARIS: Primary | ICD-10-CM

## 2020-07-21 PROCEDURE — 99213 OFFICE O/P EST LOW 20 MIN: CPT | Performed by: DERMATOLOGY

## 2020-07-21 RX ORDER — AZITHROMYCIN 250 MG/1
TABLET, FILM COATED ORAL
Qty: 12 TABLET | Refills: 1 | Status: SHIPPED | OUTPATIENT
Start: 2020-07-21 | End: 2020-08-19

## 2020-07-21 RX ORDER — SPIRONOLACTONE 25 MG/1
50 TABLET ORAL DAILY
Qty: 90 TABLET | Refills: 4 | Status: SHIPPED | OUTPATIENT
Start: 2020-07-21 | End: 2020-12-18

## 2020-07-21 RX ORDER — CLINDAMYCIN PHOSPHATE 11.9 MG/ML
SOLUTION TOPICAL 2 TIMES DAILY
Qty: 30 ML | Refills: 2 | Status: SHIPPED | OUTPATIENT
Start: 2020-07-21 | End: 2021-07-26

## 2020-07-21 RX ORDER — ADAPALENE AND BENZOYL PEROXIDE 3; 25 MG/G; MG/G
1 GEL TOPICAL
Qty: 45 G | Refills: 3 | Status: SHIPPED | OUTPATIENT
Start: 2020-07-21 | End: 2021-03-01 | Stop reason: ALTCHOICE

## 2020-07-21 RX ORDER — ADAPALENE 3 MG/G
GEL TOPICAL
Qty: 45 G | Refills: 3 | Status: CANCELLED | OUTPATIENT
Start: 2020-07-21

## 2020-07-21 NOTE — PATIENT INSTRUCTIONS
1  ACNE VULGARIS ("COMMON ACNE")    Assessment and Plan:    Start azithromycin 250 mg tablets- Take one tablet daily for 6 days straight, stop for 2 weeks, then take one tablet once daily for 6 more days    Start adapalene 0 3 % gel- Apply one pea sized amount to entire face one hour before bed every night  o May need prior authorization     Start spironolactone 50 mg- take one tablet once daily (or take two 25 mg tablets)   Continue the clindamycin 1% external solution- apply topically to face twice daily     Return in 6 weeks    We reviewed the causes of acne, the kinds of acne, and the expected clinical course   We discussed treatment options ranging from over-the-counter products, topical retinoids, antibiotics, BP, hormonal therapies (OCPs/spironolactone), and isotretinoin (Accutane)   We reviewed specific over-the-counter interventions and medications  Recommended typical hygiene measures including water-based facial products, washing regularly with mild cleanser, and refraining from picking and popping any pimples   Recommended non-comedogenic sunscreen use daily   Expectations of therapy discussed  Side effects, risks and benefits of medications discussed   A comprehensive handout on Acne was provided   The phone number to call in case of questions or concerns (and instructions to stop medications in such a scenario) was provided   After lengthy discussion of etiology and treatment options, we decided to implement the following personalized treatment plan:      ACNE:  WHAT ZIT ALL ABOUT? WHY DO I HAVE ACNE/PIMPLES? Your skin is made of layers  To keep the skin from becoming dry and cracked, the skin needs oil  The oil is made in little wells in the deeper layers in the skin  People with acne have glands that make more oil and are more easily plugged, causing the glands to swell  Hormones, bacteria and your inherited tendency to have acne all play a role      The medical term for pimples is acne or acne vulgaris (vulgaris means common)  Most people get some acne  Acne does not come from being dirty  Instead, it is an expected consequence of changes that occur during normal growth and development  Hormones, bacteria, and your family's tendency to have acne may all play a role  Whiteheads or blackheads are openings of the glands (glands are the oil factories) onto the surface of the skin  Blackheads are not caused by dirt blocking the pores; instead, they result from the oxidation reaction of oil and skin in the pores with the air (like a rust reaction)  WHAT ABOUT STRESS? Stress does not cause acne but it can make it worse  Make sure you get enough sleep and daily exercise! WHAT ABOUT FOODS/DIET? Try to eat a balanced, healthy diet  Some people feel that certain foods worsen their acne  While there aren't many studies available on this question, severe dietary changes are unlikely to help your acne and may be harmful to the health of your skin  If you find that a certain food seems to aggravate your acne, you may consider avoiding that food  Discuss this with your physician! WHAT CAUSES MY ACNE? There are four contributors to acne--the body's natural oil (sebum), clogged pores, bacteria (with the scientific name Propionibacterium acnes, or P  acnes, for short), and the body's reaction to the bacteria living in the clogged pores (which causes inflammation)  Here's what happens:     Sebum is produced in the normal oil-making glands in the deeper layers of the skin and reaches the surface through the skin's pores  An increase in certain hormones occurs around the time of puberty, and these hormones trigger the oil glands to produce increased amounts of sebum   Pores with excess oil tend to become clogged more easily     At the same time, P  acnes--one of the many types of bacteria that normally live on everyone's skin--thrives in the excess oil and causes a skin reaction (inflammation)   If a pore is clogged close to the surface, there is little inflammation  However, this results in the formation of whiteheads (closed comedones) or blackheads (open comedones) at the surface of the skin   A plug that extends to, or forms a little deeper in the pore, or one that enlarges or ruptures may cause more inflammation  The result is red bumps (papules) and pus-filled pimples (pustules)   If plugging happens in the deepest skin layer, the inflammation may be even more severe, resulting in the formation of nodules or cysts  When these types of acne heal, they may leave behind discolored areas or true scars  SKIN HYGIENE:  HOW SHOULD I [address] MY SKIN? Acne does not come from being dirty, however, washing your face is part of taking good care of your skin and will help keep your face clear  Good skin hygiene is, therefore, critical to support any acne treatment plan  Here are several specific suggestions for practicing good skin hygiene and keeping your skin looking its best:     You should wash acne-prone skin TWICE A DAY: Once in the morning and once in the evening  This does include any showers you take that day, so do not overdo it!  Do not scrub the skin with a washcloth or loofah as these can irritate and inflame your acne  Acne does not come from dirt, so it is not necessary to scrub the skin clean  In fact, scrubbing may lead to dryness and irritation that makes the acne even worse and harder for patients to tolerate acne medications   Use a gentle facial moisturizing cleanser (Cetaphil Moisturizing Cleanser or Dove Fragrance-Free bar)  Avoid using soaps like [address] [facility], or soft/liquid soaps as these products will dry your skin   Do not use any over-the-counter acne washes without your doctor's specific instruction to do so  These products often contain salicylic acid or benzoyl peroxide   These ingredients can be helpful in clearing oil from the skin and reducing bacteria, but they may also be drying and can add to irritation   Do not use exfoliating products with microbeads or brushes as these can cause irritation to the skin   Facials and other treatments to remove, squeeze, or clean out pores are not recommended  Manipulating the skin in this way can make acne worse and can lead to severe infections and/or scarring  It also increases the likelihood that the skin will not be able to tolerate acne medications   Try not to pop pimples or pick at your acne as this can delay healing and may result in scarring or skin color changes (dark spots) that are often more noticeable than the acne itself  Picking/popping acne can also cause a serious skin infection   Wash or change your pillow case once to twice a week, especially if you use products in your hair   Wash the skin as soon as possible after playing sports or other activities that cause a lot of sweating  Also, pay attention to how your sports equipment (shoulder pads, helmet strap, etc ) might be making your acne worse   When you use makeup, moisturizer, or sunscreen make sure that these products are labeled non-comedogenic, or won't clog pores, or won't cause acne         SHOULD I TREAT MY ACNE? There are a number of other skin conditions that can look like acne  If there is any question about the diagnosis, then the person should be evaluated by a board certified pediatric and adolescent dermatologist   A physician should examine any child with acne who is between the ages of 3and 9years of age, as acne in this mid-childhood age group is not normal and may signal an underlying problem     If a preadolescent (9to 6years of age) or adolescent (15to 25years of age) has mild acne and the condition is not bothersome to the individual, proper and regular skin care (what your doctor may call skin hygiene) may be all that is needed at this point  Many people do, however, need specific acne medications to help their skin look and feel its best  Your doctor will tell you if you are one of these people  If so, you may be advised to use an over-the-counter or prescription medication that is applied to the skin (a topical medication) or if the addition of an oral medication (a medication taken by [employer]) is needed  The good news is that the medications work well when used properly! Some specific factors that may influence the choice of acne therapy include:     Severity  The number and type of skin lesions (papules or comedones) and the degree of inflammation (mild, moderate or severe)   Scarring  Scarring is most common when acne is severe, but it can happen even in children with mild acne   Impact  If a child is experiencing emotional complications because of the acne or is experiencing negative comments from other children   Cost of the acne medications  An acne expert can help to keep out of pocket costs to a minimum by utilizing the correct medications and the least expensive options   The patient's skin type (oily versus dry or combination skin, for example)   Potential side effects of the medication   The ease or overall complexity of the treatment plan or medication  WHAT ACNE TREATMENTS ARE AVAILABLE? Medications for acne try to stop the formation of new pimples by reducing or removing the oil, bacteria, and other things (like dead skin cells) that clog the pores  They can also decrease the inflammation or irritation response of the skin to bacteria  It may take from 6 to 8 weeks (about 2 months!) before you see any improvement and know if the medication is effective  It takes the layers of skin this long to regenerate  Remember, these medications do not cure the condition--the acne improves because of the medication  Therefore, treatment must be continued in order to prevent the return of acne lesions      There are many types of acne treatments  Some are applied to the skin (topical medications) and some are taken by mouth (oral medications)  In most cases of mild acne, the doctor will start with a topical medication  There are many different topical medications that are helpful for acne  If acne is more severe and it does not respond adequately to a topical medication, or if it covers large body surface areas such as the back and/or chest, oral antibiotics such as Doxycycline or Minocycline and/or oral hormone therapy such as Oral Contraceptive Pills or Spironolactone may be prescribed  In the most severe cases, isotretinoin (Accutane) may be used  In general, it is usually best to start with acne medications that are least likely to cause side effects but are at the same time capable of addressing the specific causes for the acne  Some patients have a good result with just one medication, but many will need to use a combination of treatments: two or more different topical agents or an oral medication plus a topical medication  Another treatment used for acne may include corticosteroid injections, which are used to help relieve pain, decrease the size, and encourage the healing of large, inflamed acne nodules  Also, dermatologists sometimes perform acne surgery, using a fine needle, a pointed blade, or an instrument known as a comedone extractor to mechanically clean out clogged pores  One must always weigh the risk for inducing a scar with the potential benefits of any procedure  Prior treatment with topical retinoids can loosen whiteheads and blackheads and make it easier to physically remove such lesions  Heat-based devices, and light and laser therapy are being studied to see whether there is any role for such treatments in mild to moderate acne  At this time, there is not enough evidence to make general recommendations about their use      TOPICAL ACNE MEDICATIONS    WHAT KIND OF TOPICALS ARE THERE?  Benzoyl peroxide (BP) helps to fight inflammation and is anti-microbial (kills bacteria, viruses, and other microorganisms) and is believed to help prevent resistance of bacteria to topical antibiotics  A benzoyl peroxide wash may be recommended for use on large areas such as the chest and/or back  Mild irritation and dryness are common when first using benzoyl peroxide-containing products  Be careful because benzoyl peroxide can bleach towels and clothing!  Retinoids (such as adapalene, tretinoin, or tazarotene) unplug the oil glands by helping peel away the layers of skin and other things plugging the opening of the glands  Mild irritation and dryness are common when first using these products  Facial waxing and other skin procedures can lead to excessive irritation and should be avoided during retinoid therapy   Antibiotics fight bacteria and help decrease inflammation  Topical antibiotics commonly used in acne include clindamycin, erythromycin, and combination agents (such as clindamycin/benzoyl peroxide or erythromycin/benzoyl peroxide)  Mild irritation and dryness are common when first using these products  Typically, topical antibiotics should not be used alone as treatment for acne   Other topical agents include salicylic acid, azelaic acid, dapsone, and sulfacetamide  Mild irritation and dryness can also occur when first using these products  USING YOUR TOPICAL TREATMENTS LIKE A PRO   Apply topical medications only to clean, dry skin  Topical medications may lead to significant dryness of the affected areas  To minimize this, wait 15-20 minutes after washing before applying your topical medication   These medications work deep in the skin to prevent new breakouts  Spot treatment of individual pimples does not do much  When applying topical medications to the face, use the 5-dot method  Start by placing a small pea-sized amount of the medication on your finger   Then, place dots in each of five locations of your face: Mid-forehead, each cheek, nose, and chin  Next, rub the medication into the entire area of skin - not just on individual pimples! Try to avoid the delicate skin around your eyes and corners of your mouth   The medications are not magic! They take weeks if not months to work  Be patient and use your medicine on a daily basis or as directed for six weeks before asking if your skin looks better  Try not to miss more than one or two days each week when using your medications   If you are starting a new medication, then try using it every other night or even every third night   Gradually work up to [employer]bir [employer] Snehal a day    This will give your skin time to adjust    The same medications often come in various forms or formulations: Creams, ointments, lotions, gels, microspheres, or foams  Use the formulation that has been recommended and don't switch to other forms unless instructed  Some forms (such as alcohol based gels) may be more drying and less tolerable for certain skin types   Sometimes individual medications are not as effective as a combination of two or more agents  The doctor may need to try several medications or combinations before finding the one that is best for that patient   Moisturizer, sunscreen, and make-up may be used in conjunction with topical acne medications  In general, acne medications are applied first so they may directly contact the skin  Ask your physician to review specific application instructions!  It is especially important to always use sunscreen when using a topical retinoid or oral antibiotic  These drugs can make your skin more sensitive to the sun  In general, sunscreen gets applied AFTER any acne medications   Don't stop using your acne medications just because your acne got better  Remember, the acne is better because of the medication, and prevention is the james to treatment        ORAL ACNE MEDICATIONS    ORAL ANTIBIOTICS  Antibiotics include tetracycline-class medicines (which include the most commonly used oral antibiotics for acne, minocycline, and doxycycline), erythromycin, trimethoprim-sulfamethoxazole, and occasionally cephalexin or azithromycin  These drugs may decrease bacteria and inflammation, and they are most effective for moderate-to-severe inflammatory acne  A product containing benzoyl peroxide should be used along with these antibiotics to help decrease the possibility of microbial resistance  Always take your acne pills with lots of water! A pill stuck in your throat can cause significant burning and irritation  Drink a full glass of water to ensure the pill gets into your stomach  Avoid popping a pill right before bed, and stay upright for at least 1 hour after taking a pill  DOXYCYCLINE   This medication is usually taken ONCE or TWICE per day, as instructed by your physician  NOTE: Always take this medication with lots of water! A pill stuck in the throat can cause significant burning and irritation  Avoid popping a pill right before bed & stay upright for at least one hour after taking a pill  WARNING: Doxycycline increases your sensitivity to the sun, so practice excellent sun protection! If you notice any of the following, stop using the medication and notify your health care provider: headaches; blurred vision; dizziness; sun sensitivity; heartburn-stomach pain; irritation of the esophagus; darkening of scars, gums, or teeth (more often with minocycline); nail changes; yellowing of the eyes or skin (indicating possible liver disease); joint pains-and flu-like symptoms  Taking oral antibiotics with food may help with symptoms of upset stomach  COMMON SIDE EFFECTS: Headaches; dizziness; sun sensitivity; irritation of the throat; discoloration of scars, gums, or teeth; nail changes  MINOCYCLINE   This medication is usually taken ONCE or TWICE per day, as instructed by your physician     NOTE: Always take this medication with lots of water! A pill stuck in the throat can cause significant burning and irritation  Avoid popping a pill right before bed & stay upright for at least one hour after taking a pill  WARNING: Though less likely than doxycycline, minocycline may increase your sensitivity to the sun, so practice excellent sun protection! If you notice any of the following, stop using the medication and notify your health care provider: headaches; blurred vision; dizziness; sun sensitivity; heartburn-stomach pain; irritation of the throat; darkening of scars, gums, or teeth; nail changes; yellowing of the eyes or skin (indicating possible liver disease); joint pains-and flu-like symptoms  Taking oral antibiotics with food may help with symptoms of upset stomach  COMMON SIDE EFFECTS: Headaches; dizziness; sun sensitivity; irritation of the throat; discoloration of scars, gums, or teeth (often with minocycline); nail changes  Minocycline can rarely cause liver disease, joint pains, severe skin rashes, and flu-like symptoms  If you should notice yellowing of the eyes or skin, or any of the above, notify your doctor and stop using the medication immediately  HORMONAL THERAPY  Hormonal treatment is used only in females and usually consists of oral contraceptives (birth control pills)  Spironolactone is also sometimes used  ORAL CONTRACEPTIVE PILLS   This medication is also known as the Birth Control Pill    We use it for hormonal regulation of acne  Take this medication as directed on the medication packet  NOTE: Try to find a regular time in your day to take the pill so that you don't forget  The best time is about half an hour after a meal or snack, or at bedtime  If you do forget to take your daily pill at the regular time, take one as soon as you remember and take the next at your regular scheduled time     WARNING: Do not take this medication until discussing it with your physician if you smoke, are pregnant (or trying to become pregnant or could be pregnant), have a personal history of breast cancer, have any artificial hardware or implants, have a condition called Factor 5 Leiden deficiency, have a family history of clotting problems, regularly have migraine headaches (especially with aura or due to flashing lights), or have any vaginal bleeding other than that associated with your menstrual cycle  ORAL ISOTRETINOIN (used to be called the brand name Malena Benderudsen)  Isotretinoin, a derivative of vitamin A, is a powerful drug with several significant potential side effects  It is reserved for acne which is severe or when other medications have not worked well enough  It used to be sold under the brand name Accutane but now several versions exist       HAVING PROBLEMS WITH ANY OF YOUR TREATMENTS? You should not be able to see any of the medicines on your face  If you can see a white film on your skin after you apply the medication, there is too much medicine in that area and you need to apply a thinner coat and make sure it is spread evenly on your face  If your skin gets too dry, you can apply a light (non-comedogenic) moisturizer on top of your medicine or you may switch to using the medicine every other day instead of every day  If your skin is still too irritated, you may need to switch to a milder medication  If your skin is red and very itchy, you may be allergic to the medication and you should stop using it  COMMON POSSIBLE SIDE EFFECTS OF MEDICATIONS     Retinoids - dryness, redness, increased sun sensitivity   Benzoyl peroxide - drying, redness, bleaching of clothes, towels and sheets, allergy   Doxycycline - headaches; dizziness; irritation of the throat; nail changes; discoloration of teeth   Sun sensitivity - even if you have dark skin, this medicine can make you burn more easily    Make sure you protect yourself from the sun, either by avoiding being outside between 11 AM and 3 PM, wearing and reapplying sunscreen/sunblock, or wearing sun protective clothing   Nausea/vomiting - if you experience nausea with this medication, take it with food   Minocycline - headaches; dizziness; vision problems,  irritation of the throat; discoloration of scars, gums, or teeth  Can rarely cause liver disease, joint pains, and flu-like symptoms   If you should notice yellowing of the skin or any of the above, notify your doctor and stop using the medication   Birth Control Pills - nausea; headaches; breast tenderness; feeling bloated; mood changes   Spotting between periods may occur for the first three weeks of the medication, but this is not serious  It may last for two or three cycles  Please call us if the bleeding is heavier than a light flow or lasts for more than a few days  WHEN AND WHERE TO CALL WITH CONCERNS  We are here to help! If you experience any unusual symptoms, then stop taking or using the medication and call our office at [phone])  It is better to be safe than to be sorry!

## 2020-07-21 NOTE — PROGRESS NOTES
Virtual Regular Visit      Assessment/Plan:    Problem List Items Addressed This Visit     None      Visit Diagnoses     Acne vulgaris    -  Primary               Reason for visit is   Chief Complaint   Patient presents with    Virtual Regular Visit        Encounter provider Eric Turner MD    Provider located at [address] [place] [address] Kristina Russo[address] [facility]  [phone]      Recent Visits  No visits were found meeting these conditions  Showing recent visits within past 7 days and meeting all other requirements     Future Appointments  No visits were found meeting these conditions  Showing future appointments within next 150 days and meeting all other requirements        The patient was identified by name and date of birth  Orly Crowe was informed that this is a telemedicine visit and that the visit is being conducted through [employer]  My office door was closed  The following individuals were in the room with me and the patient informed Luca Escoto  She acknowledged consent and understanding of privacy and security of the video platform  The patient has agreed to participate and understands they can discontinue the visit at any time  Patient is aware this is a billable service  Subjective  Orly Crowe is a 23 y o  female see note below        HPI     Past Medical History:   Diagnosis Date    ADHD (attention deficit hyperactivity disorder)     Borderline personality disorder ([facility] [address] )     Depression     Fracture of carpal bones     Hypothyroidism     Hypothyroidism (acquired)     PTSD (post-traumatic stress disorder)     Suicide and self-inflicted injury ([facility] [address] ) 29/5040    Suicide attempt [facility])        Past Surgical History:   Procedure Laterality Date    WISDOM TOOTH EXTRACTION         Current Outpatient Medications   Medication Sig Dispense Refill    Adapalene-Benzoyl Peroxide 0 1-2 5 % gel Apply 1 application topically daily at bedtime 45 g 3    ALPRAZolam (XANAX) 0 5 mg tablet Take 1 tablet (0 5 mg total) by mouth daily at bedtime as needed for anxiety 5 tablet 0    amphetamine-dextroamphetamine (ADDERALL XR) 10 MG 24 hr capsule Take 2 capsules (20 mg total) by mouth every morningMax Daily Amount: 20 mg 60 capsule 0    busPIRone (BUSPAR) 10 mg tablet Take 2 tablets (20 mg total) by mouth 2 (two) times a day 70 tablet 2    carBAMazepine (TEGretol XR) 100 mg 12 hr tablet Take 1 tablet (100 mg total) by mouth 2 (two) times a day 180 tablet 0    fluticasone (FLONASE) 50 mcg/act nasal spray 1 spray into each nostril daily 1 Bottle 2    levonorgestrel (MIRENA) 20 MCG/24HR IUD 1 each by Intrauterine route once      levothyroxine 50 mcg tablet TAKE 1 TABLET BY MOUTH EVERY DAY 90 tablet 1    REXULTI 2 MG tablet TAKE 1 5 TABLETS (3 MG TOTAL) BY MOUTH DAILY 90 tablet 1    spironolactone (ALDACTONE) 25 mg tablet Take 1 tablet (25 mg total) by mouth daily 90 tablet 2     No current facility-administered medications for this visit  No Known Allergies    Review of Systems    Video Exam    There were no vitals filed for this visit  Physical Exam     I spent 12 minutes directly with the patient during this visit      VIRTUAL VISIT DISCLAIMER    Artur Hua acknowledges that she has consented to an online visit or consultation  She understands that the online visit is based solely on information provided by her, and that, in the absence of a face-to-face physical evaluation by the physician, the diagnosis she receives is both limited and provisional in terms of accuracy and completeness  This is not intended to replace a full medical face-to-face evaluation by the physician  Artur Hua understands and accepts these terms  [facility] VIRTUAL Clinic Note     Patient Name: Artur Hua  Encounter Date: 7/21/2020     Have you been cared for by a [facility] Dermatologist in the last 3 years and, if so, which one?   YES, Dr Gary Ahmadi     · Have you traveled outside of the [address] [place] [address] in the past 3 months or outside of the [facility] area in the last 2 weeks? No     May we call your Preferred Phone number to discuss your specific medical information? Yes     May we leave a detailed message that includes your specific medical information? Yes      Today's Chief Concerns:   Concern #1: Acne follow up     Current Medications:   (please update all dermatological medications before printing patient's AVS!)      Current Outpatient Medications:     Adapalene-Benzoyl Peroxide 0 1-2 5 % gel, Apply 1 application topically daily at bedtime, Disp: 45 g, Rfl: 3    ALPRAZolam (XANAX) 0 5 mg tablet, Take 1 tablet (0 5 mg total) by mouth daily at bedtime as needed for anxiety, Disp: 5 tablet, Rfl: 0    amphetamine-dextroamphetamine (ADDERALL XR) 10 MG 24 hr capsule, Take 2 capsules (20 mg total) by mouth every morningMax Daily Amount: 20 mg, Disp: 60 capsule, Rfl: 0    busPIRone (BUSPAR) 10 mg tablet, Take 2 tablets (20 mg total) by mouth 2 (two) times a day, Disp: 70 tablet, Rfl: 2    carBAMazepine (TEGretol XR) 100 mg 12 hr tablet, Take 1 tablet (100 mg total) by mouth 2 (two) times a day, Disp: 180 tablet, Rfl: 0    fluticasone (FLONASE) 50 mcg/act nasal spray, 1 spray into each nostril daily, Disp: 1 Bottle, Rfl: 2    levonorgestrel (MIRENA) 20 MCG/24HR IUD, 1 each by Intrauterine route once, Disp: , Rfl:     levothyroxine 50 mcg tablet, TAKE 1 TABLET BY MOUTH EVERY DAY, Disp: 90 tablet, Rfl: 1    REXULTI 2 MG tablet, TAKE 1 5 TABLETS (3 MG TOTAL) BY MOUTH DAILY, Disp: 90 tablet, Rfl: 1    spironolactone (ALDACTONE) 25 mg tablet, Take 1 tablet (25 mg total) by mouth daily, Disp: 90 tablet, Rfl: 2      Review of Systems:  Have you recently had or currently have any of the following? If YES, what are you doing for the problem?     · Fever, chills or unintended weight loss: No  · Sudden loss or change in your vision: No  · Nausea, vomiting or blood in your stool: No  · Painful or swollen joints: No  · Wheezing or cough: No  · Changing mole or non-healing wound: No  · Nosebleeds: No  · Excessive sweating: No  · Easy or prolonged bleeding? No  · Over the last 2 weeks, how often have you been bothered by the following problems? · Taking little interest or pleasure in doing things: 1 - Not at All  · Feeling down, depressed, or hopeless: 1 - Not at All  · Rapid heartbeat with epinephrine:  No    · FEMALES ONLY:    · Are you pregnant or planning to become pregnant? No  · Are you currently or planning to be nursing or breast feeding? No    · Any known allergies?      · No Known Allergies      Physical Exam:     Was a chaperone (Derm Clinical Assistant) present throughout the entire virtual Physical Exam? Yes     Did the Dermatology Team specifically  the patient on the technical and practical limitations of a virtual Physical Exam? Yes}  o Did the patient ultimately request or accept a virtual Physical Exam?  Yes  o Did the patient specifically refuse to have the areas "under-the-bra" examined by the Dermatologist? No  o Did the patient specifically refuse to have the areas "under-the-underwear" examined by the Dermatologist? No    CONSTITUTIONAL:   Appearance: alert, well appearing, and in no distress  PSYCH: Normal mood and affect  EYES: Normal appearing eyes with normal color; no obvious deformities  ENT: Normal ears, nose, lips and neck with normal color and no obvious deformities; no obvious difficulty swallowing  CARDIOVASCULAR: No obvious edema; no obvious jugular venous distension  RESPIRATORY: Normal appearing respirations; no obvious shortness of breath  HEME/LYMPH/IMMUNO:  Normal color without obvious pallor, jaundice, petechiae or bleeding; no obvious cervical chain masses    SKIN:  FULL ORGAN SYSTEM EXAM  Hair, Scalp, Ears, Face Normal except as noted below in Assessment   Neck, Cervical Chain Nodes    Right Arm/Hand/Fingers    Left Arm/Hand/Fingers    Chest/Breasts/Axillae    Abdomen, Umbilicus    Back/Spine    Groin/Genitalia/Buttocks    Right Leg, Foot, Toes    Left Leg, Foot, Toes         Assessment and Plan by Diagnosis:    1  ACNE VULGARIS ("COMMON ACNE")    Physical Exam:   Psychiatric/Mood:   Anatomic Location Affected:  face   Morphological Description:  o Open/Closed Comedones:  - Several ("Moderate")  o Inflammatory Papules/Pustules:  - Several ("Moderate")  o Nodules:  - Several ("Moderate")  o Scarring:  - Several ("Moderate")  o Excoriations:  - No evidence ("Clear")  o Local Skin Redness/Erythema:  - Several ("Moderate")  o Local Skin Dryness/Scaling:  - No evidence ("Clear")  o Local Skin Dyspigmentation:  - Few ("Mild")   Pertinent Positives:   Pertinent Negatives: Additional History of Present Condition:  Patient reports use of doxycycline 100 mg BID but stopped 3 weeks ago because it upset her stomach because of her medication for her thyroid, spironolactone 25 mg bid, adapalene 0 1 %, and clindamycin  Patient reports acne is still present  Assessment and Plan:  Patient with slight improvement in acne  Hampered by inability to take doxy due to nausea  With switch to azithromycin, increase martín to 50, increase adapalene/BPto 0 3%    May need to consider laser treatment, isotretinoin or adding OCPs (currently has IUD)    Start azithromycin 250 mg tablets- Take one tablet daily for 6 days straight, stop for 2 weeks, then take one tablet once daily for 6 more days    Start adapalene benzoyl peroxide 0 3 % gel- Apply one pea sized amount to entire face one hour before bed every night  o May need prior authorization     Start spironolactone 50 mg- take one tablet once daily (or take two 25 mg tablets)   Continue the clindamycin 1% external solution- apply topically to face twice daily    Return in 6 weeks    We reviewed the causes of acne, the kinds of acne, and the expected clinical course   We discussed treatment options ranging from over-the-counter products, topical retinoids, antibiotics, BP, hormonal therapies (OCPs/spironolactone), and isotretinoin (Accutane)   We reviewed specific over-the-counter interventions and medications  Recommended typical hygiene measures including water-based facial products, washing regularly with mild cleanser, and refraining from picking and popping any pimples   Recommended non-comedogenic sunscreen use daily   Expectations of therapy discussed  Side effects, risks and benefits of medications discussed   A comprehensive handout on Acne was provided   The phone number to call in case of questions or concerns (and instructions to stop medications in such a scenario) was provided   After lengthy discussion of etiology and treatment options, we decided to implement the following personalized treatment plan:      ACNE:  WHAT ZIT ALL ABOUT? WHY DO I HAVE ACNE/PIMPLES? Your skin is made of layers  To keep the skin from becoming dry and cracked, the skin needs oil  The oil is made in little wells in the deeper layers in the skin  People with acne have glands that make more oil and are more easily plugged, causing the glands to swell  Hormones, bacteria and your inherited tendency to have acne all play a role  The medical term for pimples is acne or acne vulgaris (vulgaris means common)  Most people get some acne  Acne does not come from being dirty  Instead, it is an expected consequence of changes that occur during normal growth and development  Hormones, bacteria, and your family's tendency to have acne may all play a role  Whiteheads or blackheads are openings of the glands (glands are the oil factories) onto the surface of the skin  Blackheads are not caused by dirt blocking the pores; instead, they result from the oxidation reaction of oil and skin in the pores with the air (like a rust reaction)  WHAT ABOUT STRESS? Stress does not cause acne but it can make it worse  Make sure you get enough sleep and daily exercise! WHAT ABOUT FOODS/DIET? Try to eat a balanced, healthy diet  Some people feel that certain foods worsen their acne  While there aren't many studies available on this question, severe dietary changes are unlikely to help your acne and may be harmful to the health of your skin  If you find that a certain food seems to aggravate your acne, you may consider avoiding that food  Discuss this with your physician! WHAT CAUSES MY ACNE? There are four contributors to acne--the body's natural oil (sebum), clogged pores, bacteria (with the scientific name Propionibacterium acnes, or P  acnes, for short), and the body's reaction to the bacteria living in the clogged pores (which causes inflammation)  Here's what happens:     Sebum is produced in the normal oil-making glands in the deeper layers of the skin and reaches the surface through the skin's pores  An increase in certain hormones occurs around the time of puberty, and these hormones trigger the oil glands to produce increased amounts of sebum   Pores with excess oil tend to become clogged more easily   At the same time, P  acnes--one of the many types of bacteria that normally live on everyone's skin--thrives in the excess oil and causes a skin reaction (inflammation)   If a pore is clogged close to the surface, there is little inflammation  However, this results in the formation of whiteheads (closed comedones) or blackheads (open comedones) at the surface of the skin   A plug that extends to, or forms a little deeper in the pore, or one that enlarges or ruptures may cause more inflammation  The result is red bumps (papules) and pus-filled pimples (pustules)   If plugging happens in the deepest skin layer, the inflammation may be even more severe, resulting in the formation of nodules or cysts   When these types of acne heal, they may leave behind discolored areas or true scars  SKIN HYGIENE:  HOW SHOULD I [facility] MY SKIN? Acne does not come from being dirty, however, washing your face is part of taking good care of your skin and will help keep your face clear  Good skin hygiene is, therefore, critical to support any acne treatment plan  Here are several specific suggestions for practicing good skin hygiene and keeping your skin looking its best:     You should wash acne-prone skin TWICE A DAY: Once in the morning and once in the evening  This does include any showers you take that day, so do not overdo it!  Do not scrub the skin with a washcloth or loofah as these can irritate and inflame your acne  Acne does not come from dirt, so it is not necessary to scrub the skin clean  In fact, scrubbing may lead to dryness and irritation that makes the acne even worse and harder for patients to tolerate acne medications   Use a gentle facial moisturizing cleanser (Cetaphil Moisturizing Cleanser or Dove Fragrance-Free bar)  Avoid using soaps like Chitra Stroud, [address], [address], or soft/liquid soaps as these products will dry your skin   Do not use any over-the-counter acne washes without your doctor's specific instruction to do so  These products often contain salicylic acid or benzoyl peroxide  These ingredients can be helpful in clearing oil from the skin and reducing bacteria, but they may also be drying and can add to irritation   Do not use exfoliating products with microbeads or brushes as these can cause irritation to the skin   Facials and other treatments to remove, squeeze, or clean out pores are not recommended  Manipulating the skin in this way can make acne worse and can lead to severe infections and/or scarring  It also increases the likelihood that the skin will not be able to tolerate acne medications      Try not to pop pimples or pick at your acne as this can delay healing and may result in scarring or skin color changes (dark spots) that are often more noticeable than the acne itself  Picking/popping acne can also cause a serious skin infection   Wash or change your pillow case once to twice a week, especially if you use products in your hair   Wash the skin as soon as possible after playing sports or other activities that cause a lot of sweating  Also, pay attention to how your sports equipment (shoulder pads, helmet strap, etc ) might be making your acne worse   When you use makeup, moisturizer, or sunscreen make sure that these products are labeled non-comedogenic, or won't clog pores, or won't cause acne         SHOULD I TREAT MY ACNE? There are a number of other skin conditions that can look like acne  If there is any question about the diagnosis, then the person should be evaluated by a board certified pediatric and adolescent dermatologist   A physician should examine any child with acne who is between the ages of 3and 9years of age, as acne in this mid-childhood age group is not normal and may signal an underlying problem  If a preadolescent (9to 6years of age) or adolescent (15to 25years of age) has mild acne and the condition is not bothersome to the individual, proper and regular skin care (what your doctor may call skin hygiene) may be all that is needed at this point  Many people do, however, need specific acne medications to help their skin look and feel its best  Your doctor will tell you if you are one of these people  If so, you may be advised to use an over-the-counter or prescription medication that is applied to the skin (a topical medication) or if the addition of an oral medication (a medication taken by [employer]) is needed  The good news is that the medications work well when used properly! Some specific factors that may influence the choice of acne therapy include:     Severity   The number and type of skin lesions (papules or comedones) and the degree of inflammation (mild, moderate or severe)   Scarring  Scarring is most common when acne is severe, but it can happen even in children with mild acne   Impact  If a child is experiencing emotional complications because of the acne or is experiencing negative comments from other children   Cost of the acne medications  An acne expert can help to keep out of pocket costs to a minimum by utilizing the correct medications and the least expensive options   The patient's skin type (oily versus dry or combination skin, for example)   Potential side effects of the medication   The ease or overall complexity of the treatment plan or medication  WHAT ACNE TREATMENTS ARE AVAILABLE? Medications for acne try to stop the formation of new pimples by reducing or removing the oil, bacteria, and other things (like dead skin cells) that clog the pores  They can also decrease the inflammation or irritation response of the skin to bacteria  It may take from 6 to 8 weeks (about 2 months!) before you see any improvement and know if the medication is effective  It takes the layers of skin this long to regenerate  Remember, these medications do not cure the condition--the acne improves because of the medication  Therefore, treatment must be continued in order to prevent the return of acne lesions  There are many types of acne treatments  Some are applied to the skin (topical medications) and some are taken by mouth (oral medications)  In most cases of mild acne, the doctor will start with a topical medication  There are many different topical medications that are helpful for acne  If acne is more severe and it does not respond adequately to a topical medication, or if it covers large body surface areas such as the back and/or chest, oral antibiotics such as Doxycycline or Minocycline and/or oral hormone therapy such as Oral Contraceptive Pills or Spironolactone may be prescribed   In the most severe cases, isotretinoin (Accutane) may be used  In general, it is usually best to start with acne medications that are least likely to cause side effects but are at the same time capable of addressing the specific causes for the acne  Some patients have a good result with just one medication, but many will need to use a combination of treatments: two or more different topical agents or an oral medication plus a topical medication  Another treatment used for acne may include corticosteroid injections, which are used to help relieve pain, decrease the size, and encourage the healing of large, inflamed acne nodules  Also, dermatologists sometimes perform acne surgery, using a fine needle, a pointed blade, or an instrument known as a comedone extractor to mechanically clean out clogged pores  One must always weigh the risk for inducing a scar with the potential benefits of any procedure  Prior treatment with topical retinoids can loosen whiteheads and blackheads and make it easier to physically remove such lesions  Heat-based devices, and light and laser therapy are being studied to see whether there is any role for such treatments in mild to moderate acne  At this time, there is not enough evidence to make general recommendations about their use  TOPICAL ACNE MEDICATIONS    WHAT KIND OF TOPICALS ARE THERE?  Benzoyl peroxide (BP) helps to fight inflammation and is anti-microbial (kills bacteria, viruses, and other microorganisms) and is believed to help prevent resistance of bacteria to topical antibiotics  A benzoyl peroxide wash may be recommended for use on large areas such as the chest and/or back  Mild irritation and dryness are common when first using benzoyl peroxide-containing products  Be careful because benzoyl peroxide can bleach towels and clothing!    Retinoids (such as adapalene, tretinoin, or tazarotene) unplug the oil glands by helping peel away the layers of skin and other things plugging the opening of the glands  Mild irritation and dryness are common when first using these products  Facial waxing and other skin procedures can lead to excessive irritation and should be avoided during retinoid therapy   Antibiotics fight bacteria and help decrease inflammation  Topical antibiotics commonly used in acne include clindamycin, erythromycin, and combination agents (such as clindamycin/benzoyl peroxide or erythromycin/benzoyl peroxide)  Mild irritation and dryness are common when first using these products  Typically, topical antibiotics should not be used alone as treatment for acne   Other topical agents include salicylic acid, azelaic acid, dapsone, and sulfacetamide  Mild irritation and dryness can also occur when first using these products  USING YOUR TOPICAL TREATMENTS LIKE A PRO   Apply topical medications only to clean, dry skin  Topical medications may lead to significant dryness of the affected areas  To minimize this, wait 15-20 minutes after washing before applying your topical medication   These medications work deep in the skin to prevent new breakouts  Spot treatment of individual pimples does not do much  When applying topical medications to the face, use the 5-dot method  Start by placing a small pea-sized amount of the medication on your finger  Then, place dots in each of five locations of your face: Mid-forehead, each cheek, nose, and chin  Next, rub the medication into the entire area of skin - not just on individual pimples! Try to avoid the delicate skin around your eyes and corners of your mouth   The medications are not magic! They take weeks if not months to work  Be patient and use your medicine on a daily basis or as directed for six weeks before asking if your skin looks better  Try not to miss more than one or two days each week when using your medications     If you are starting a new medication, then try using it every other night or even every third night   Gradually work up to [employer] a day    This will give your skin time to adjust    The same medications often come in various forms or formulations: Creams, ointments, lotions, gels, microspheres, or foams  Use the formulation that has been recommended and don't switch to other forms unless instructed  Some forms (such as alcohol based gels) may be more drying and less tolerable for certain skin types   Sometimes individual medications are not as effective as a combination of two or more agents  The doctor may need to try several medications or combinations before finding the one that is best for that patient   Moisturizer, sunscreen, and make-up may be used in conjunction with topical acne medications  In general, acne medications are applied first so they may directly contact the skin  Ask your physician to review specific application instructions!  It is especially important to always use sunscreen when using a topical retinoid or oral antibiotic  These drugs can make your skin more sensitive to the sun  In general, sunscreen gets applied AFTER any acne medications   Don't stop using your acne medications just because your acne got better  Remember, the acne is better because of the medication, and prevention is the james to treatment  ORAL ACNE MEDICATIONS    ORAL ANTIBIOTICS  Antibiotics include tetracycline-class medicines (which include the most commonly used oral antibiotics for acne, minocycline, and doxycycline), erythromycin, trimethoprim-sulfamethoxazole, and occasionally cephalexin or azithromycin  These drugs may decrease bacteria and inflammation, and they are most effective for moderate-to-severe inflammatory acne  A product containing benzoyl peroxide should be used along with these antibiotics to help decrease the possibility of microbial resistance  Always take your acne pills with lots of water!   A pill stuck in your throat can cause significant burning and irritation  Drink a full glass of water to ensure the pill gets into your stomach  Avoid popping a pill right before bed, and stay upright for at least 1 hour after taking a pill  DOXYCYCLINE   This medication is usually taken ONCE or TWICE per day, as instructed by your physician  NOTE: Always take this medication with lots of water! A pill stuck in the throat can cause significant burning and irritation  Avoid popping a pill right before bed & stay upright for at least one hour after taking a pill  WARNING: Doxycycline increases your sensitivity to the sun, so practice excellent sun protection! If you notice any of the following, stop using the medication and notify your health care provider: headaches; blurred vision; dizziness; sun sensitivity; heartburn-stomach pain; irritation of the esophagus; darkening of scars, gums, or teeth (more often with minocycline); nail changes; yellowing of the eyes or skin (indicating possible liver disease); joint pains-and flu-like symptoms  Taking oral antibiotics with food may help with symptoms of upset stomach  COMMON SIDE EFFECTS: Headaches; dizziness; sun sensitivity; irritation of the throat; discoloration of scars, gums, or teeth; nail changes  MINOCYCLINE   This medication is usually taken ONCE or TWICE per day, as instructed by your physician  NOTE: Always take this medication with lots of water! A pill stuck in the throat can cause significant burning and irritation  Avoid popping a pill right before bed & stay upright for at least one hour after taking a pill  WARNING: Though less likely than doxycycline, minocycline may increase your sensitivity to the sun, so practice excellent sun protection!  If you notice any of the following, stop using the medication and notify your health care provider: headaches; blurred vision; dizziness; sun sensitivity; heartburn-stomach pain; irritation of the throat; darkening of scars, gums, or teeth; nail changes; yellowing of the eyes or skin (indicating possible liver disease); joint pains-and flu-like symptoms  Taking oral antibiotics with food may help with symptoms of upset stomach  COMMON SIDE EFFECTS: Headaches; dizziness; sun sensitivity; irritation of the throat; discoloration of scars, gums, or teeth (often with minocycline); nail changes  Minocycline can rarely cause liver disease, joint pains, severe skin rashes, and flu-like symptoms  If you should notice yellowing of the eyes or skin, or any of the above, notify your doctor and stop using the medication immediately  HORMONAL THERAPY  Hormonal treatment is used only in females and usually consists of oral contraceptives (birth control pills)  Spironolactone is also sometimes used  ORAL CONTRACEPTIVE PILLS   This medication is also known as the Birth Control Pill    We use it for hormonal regulation of acne  Take this medication as directed on the medication packet  NOTE: Try to find a regular time in your day to take the pill so that you don't forget  The best time is about half an hour after a meal or snack, or at bedtime  If you do forget to take your daily pill at the regular time, take one as soon as you remember and take the next at your regular scheduled time  WARNING: Do not take this medication until discussing it with your physician if you smoke, are pregnant (or trying to become pregnant or could be pregnant), have a personal history of breast cancer, have any artificial hardware or implants, have a condition called Factor 5 Leiden deficiency, have a family history of clotting problems, regularly have migraine headaches (especially with aura or due to flashing lights), or have any vaginal bleeding other than that associated with your menstrual cycle  ORAL ISOTRETINOIN (used to be called the brand name Sonia Turner)  Isotretinoin, a derivative of vitamin A, is a powerful drug with several significant potential side effects   It is reserved for acne which is severe or when other medications have not worked well enough  It used to be sold under the brand name Accutane but now several versions exist       HAVING PROBLEMS WITH ANY OF YOUR TREATMENTS? You should not be able to see any of the medicines on your face  If you can see a white film on your skin after you apply the medication, there is too much medicine in that area and you need to apply a thinner coat and make sure it is spread evenly on your face  If your skin gets too dry, you can apply a light (non-comedogenic) moisturizer on top of your medicine or you may switch to using the medicine every other day instead of every day  If your skin is still too irritated, you may need to switch to a milder medication  If your skin is red and very itchy, you may be allergic to the medication and you should stop using it  COMMON POSSIBLE SIDE EFFECTS OF MEDICATIONS     Retinoids - dryness, redness, increased sun sensitivity   Benzoyl peroxide - drying, redness, bleaching of clothes, towels and sheets, allergy   Doxycycline - headaches; dizziness; irritation of the throat; nail changes; discoloration of teeth   Sun sensitivity - even if you have dark skin, this medicine can make you burn more easily  Make sure you protect yourself from the sun, either by avoiding being outside between 11 AM and 3 PM, wearing and reapplying sunscreen/sunblock, or wearing sun protective clothing   Nausea/vomiting - if you experience nausea with this medication, take it with food   Minocycline - headaches; dizziness; vision problems,  irritation of the throat; discoloration of scars, gums, or teeth  Can rarely cause liver disease, joint pains, and flu-like symptoms       If you should notice yellowing of the skin or any of the above, notify your doctor and stop using the medication   Birth Control Pills - nausea; headaches; breast tenderness; feeling bloated; mood changes   Spotting between periods may occur for the first three weeks of the medication, but this is not serious  It may last for two or three cycles  Please call us if the bleeding is heavier than a light flow or lasts for more than a few days  WHEN AND WHERE TO CALL WITH CONCERNS  We are here to help! If you experience any unusual symptoms, then stop taking or using the medication and call our office at [phone] (SKIN)  It is better to be safe than to be sorry!                     Begin "ASSESSMENT AND PLAN BY DIAGNOSIS" here

## 2020-07-28 DIAGNOSIS — F90.2 ADHD (ATTENTION DEFICIT HYPERACTIVITY DISORDER), COMBINED TYPE: ICD-10-CM

## 2020-07-30 RX ORDER — DEXTROAMPHETAMINE SACCHARATE, AMPHETAMINE ASPARTATE MONOHYDRATE, DEXTROAMPHETAMINE SULFATE AND AMPHETAMINE SULFATE 2.5; 2.5; 2.5; 2.5 MG/1; MG/1; MG/1; MG/1
20 CAPSULE, EXTENDED RELEASE ORAL EVERY MORNING
Qty: 60 CAPSULE | Refills: 0 | Status: SHIPPED | OUTPATIENT
Start: 2020-07-30 | End: 2020-08-20

## 2020-08-14 ENCOUNTER — TELEPHONE (OUTPATIENT)
Dept: PSYCHIATRY | Facility: CLINIC | Age: 20
End: 2020-08-14

## 2020-08-14 NOTE — TELEPHONE ENCOUNTER
On further review of prior authorization 6/19/20, no appeal was submitted - the patient would have needed to sign an authorization for an appeal to be completed and [facility] never sent more information about appeal     New P A submitted 8/13/20 and a denial was received - the dose of 3 mg total is available in one tab instead of the current prescription  For Josephine's review of change to Rexulti dosing  Doris's next appointment is 8/20/20 at 9:30

## 2020-08-20 ENCOUNTER — TELEMEDICINE (OUTPATIENT)
Dept: PSYCHIATRY | Facility: CLINIC | Age: 20
End: 2020-08-20
Payer: COMMERCIAL

## 2020-08-20 DIAGNOSIS — F90.2 ADHD (ATTENTION DEFICIT HYPERACTIVITY DISORDER), COMBINED TYPE: ICD-10-CM

## 2020-08-20 DIAGNOSIS — R44.0 AUDITORY HALLUCINATIONS: ICD-10-CM

## 2020-08-20 DIAGNOSIS — F41.9 ANXIETY: Primary | ICD-10-CM

## 2020-08-20 DIAGNOSIS — F39 MOOD DISORDER (HCC): ICD-10-CM

## 2020-08-20 PROCEDURE — 4004F PT TOBACCO SCREEN RCVD TLK: CPT | Performed by: NURSE PRACTITIONER

## 2020-08-20 PROCEDURE — 99214 OFFICE O/P EST MOD 30 MIN: CPT | Performed by: NURSE PRACTITIONER

## 2020-08-20 RX ORDER — ARIPIPRAZOLE 2 MG/1
2 TABLET ORAL DAILY
Qty: 90 TABLET | Refills: 0 | Status: SHIPPED | OUTPATIENT
Start: 2020-08-20 | End: 2020-10-05

## 2020-08-20 RX ORDER — DEXTROAMPHETAMINE SACCHARATE, AMPHETAMINE ASPARTATE MONOHYDRATE, DEXTROAMPHETAMINE SULFATE AND AMPHETAMINE SULFATE 2.5; 2.5; 2.5; 2.5 MG/1; MG/1; MG/1; MG/1
10 CAPSULE, EXTENDED RELEASE ORAL EVERY MORNING
Qty: 30 CAPSULE | Refills: 0
Start: 2020-08-20 | End: 2020-09-18 | Stop reason: SDUPTHER

## 2020-08-20 RX ORDER — HYDROXYZINE HYDROCHLORIDE 25 MG/1
25 TABLET, FILM COATED ORAL EVERY 6 HOURS PRN
Qty: 45 TABLET | Refills: 1 | Status: SHIPPED | OUTPATIENT
Start: 2020-08-20 | End: 2020-12-04

## 2020-08-20 RX ORDER — RISPERIDONE 0.25 MG/1
0.25 TABLET, FILM COATED ORAL DAILY
Qty: 90 TABLET | Refills: 0 | Status: SHIPPED | OUTPATIENT
Start: 2020-08-20 | End: 2020-08-20

## 2020-08-25 ENCOUNTER — TELEPHONE (OUTPATIENT)
Dept: PSYCHIATRY | Facility: CLINIC | Age: 20
End: 2020-08-25

## 2020-08-25 NOTE — TELEPHONE ENCOUNTER
Hay Betancourt called and is requesting a letter for her school regarding her SALVADOR animal (dog)  She needs letter to have a diagnosis other than ADHD  She needs diagnosis such as depression or anxiety  She also needs it to state her limitations and why an SALVADOR animal would help those limitations  She said she was going to email you as well  She said the school is unsure if they will let her keep the dog on campus  Hay Betancourt said once finished you can email to her and she will give to school   Hay Betancourt [phone]    Please advise

## 2020-08-31 ENCOUNTER — TELEPHONE (OUTPATIENT)
Dept: PSYCHIATRY | Facility: CLINIC | Age: 20
End: 2020-08-31

## 2020-08-31 NOTE — TELEPHONE ENCOUNTER
Chrissy Villela called and left message  Stated her anxiety medication is not working  She can't fall asleep at night  She thinks she needs to try something else      Please advise [phone]

## 2020-09-02 DIAGNOSIS — E03.9 PRIMARY HYPOTHYROIDISM: ICD-10-CM

## 2020-09-02 RX ORDER — LEVOTHYROXINE SODIUM 0.05 MG/1
50 TABLET ORAL DAILY
Qty: 90 TABLET | Refills: 1 | Status: SHIPPED | OUTPATIENT
Start: 2020-09-02 | End: 2021-02-24

## 2020-09-03 ENCOUNTER — TELEPHONE (OUTPATIENT)
Dept: PSYCHIATRY | Facility: CLINIC | Age: 20
End: 2020-09-03

## 2020-09-03 NOTE — TELEPHONE ENCOUNTER
Patient called she has been having a hard time lately  She is hearing voices, very anxious, not sleeping she says medications are not working  She also stated she has been thinking about cutting again  She has not acted on cutting, but thought has gone through her head  She is feeling paranoid  Looking to maybe go back to [address] [place] [address]    Please call [phone]

## 2020-09-03 NOTE — TELEPHONE ENCOUNTER
Nursing spoke with [facility] as well  She said she does not feel like she will act upon any thought  She feels safe and contracts for safety  She does have crisis numbers if needed and will utilize if necessary  She would like a call from Bernard when she gets this message

## 2020-09-08 ENCOUNTER — OFFICE VISIT (OUTPATIENT)
Dept: FAMILY MEDICINE CLINIC | Facility: CLINIC | Age: 20
End: 2020-09-08
Payer: COMMERCIAL

## 2020-09-08 VITALS
SYSTOLIC BLOOD PRESSURE: 102 MMHG | WEIGHT: 140 LBS | RESPIRATION RATE: 17 BRPM | HEART RATE: 97 BPM | OXYGEN SATURATION: 99 % | TEMPERATURE: 98.2 F | HEIGHT: 64 IN | BODY MASS INDEX: 23.9 KG/M2 | DIASTOLIC BLOOD PRESSURE: 60 MMHG

## 2020-09-08 DIAGNOSIS — R63.4 WEIGHT LOSS: ICD-10-CM

## 2020-09-08 DIAGNOSIS — R11.2 NON-INTRACTABLE VOMITING WITH NAUSEA, UNSPECIFIED VOMITING TYPE: ICD-10-CM

## 2020-09-08 DIAGNOSIS — M54.50 ACUTE RIGHT-SIDED LOW BACK PAIN WITHOUT SCIATICA: ICD-10-CM

## 2020-09-08 DIAGNOSIS — F41.1 GENERALIZED ANXIETY DISORDER: Primary | ICD-10-CM

## 2020-09-08 DIAGNOSIS — R53.83 OTHER FATIGUE: ICD-10-CM

## 2020-09-08 PROCEDURE — 99214 OFFICE O/P EST MOD 30 MIN: CPT | Performed by: FAMILY MEDICINE

## 2020-09-08 RX ORDER — CLONAZEPAM 0.5 MG/1
0.25 TABLET ORAL DAILY PRN
Qty: 10 TABLET | Refills: 0 | Status: SHIPPED | OUTPATIENT
Start: 2020-09-08 | End: 2021-02-05

## 2020-09-08 RX ORDER — DOXYCYCLINE 100 MG/1
100 CAPSULE ORAL 2 TIMES DAILY
COMMUNITY
End: 2021-02-05

## 2020-09-08 RX ORDER — ONDANSETRON 4 MG/1
4 TABLET, FILM COATED ORAL EVERY 8 HOURS PRN
Qty: 15 TABLET | Refills: 0 | Status: SHIPPED | OUTPATIENT
Start: 2020-09-08 | End: 2021-02-05

## 2020-09-08 NOTE — PROGRESS NOTES
Assessment/Plan:    No problem-specific Assessment & Plan notes found for this encounter  Diagnoses and all orders for this visit:    Generalized anxiety disorder  -     TSH, 3rd generation with Free T4 reflex; Future  -     clonazePAM (KlonoPIN) 0 5 mg tablet; Take 0 5 tablets (0 25 mg total) by mouth daily as needed for anxiety    Non-intractable vomiting with nausea, unspecified vomiting type  -     Comprehensive metabolic panel; Future  -     hCG Ql w/reflex to hCG Qn; Future  -     ondansetron (ZOFRAN) 4 mg tablet; Take 1 tablet (4 mg total) by mouth every 8 (eight) hours as needed for nausea or vomiting    Weight loss  -     TSH, 3rd generation with Free T4 reflex; Future    Other fatigue  -     CBC and differential; Future    Acute right-sided low back pain without sciatica  Comments:   use Motrin 600 mg 3 times a day for 5 days  If abdominal pain or nausea worsens stop this medication  Other orders  -     doxycycline monohydrate (MONODOX) 100 mg capsule; Take 100 mg by mouth 2 (two) times a day        Patient Instructions    differentials include hyperthyroidism, exacerbation of anxiety, pregnancy, doxycycline side effects  Get blood work done in the am   Stop doxycycline for 2 weeks  When you restart, eat immediately when having symptoms of nausea  Stop using marijuana  Discuss continued clonazepam usage with psychiatry  Try not to use every day  Start melatonin 5mg at bedtime  Use Zofran as needed  patient will follow-up after lab result  Subjective: multiple complaints     Patient ID: Artur Hua is a 23 y o  female  HPI  Anxiety/depression: Has a psychiatrist but is having trouble getting in touch recently  On abilify, adderall  Hydroxyzine was started but has not been helping  Pt overused klonapin on one occasion in 2018 which led to a hospitalization  This was not a suicide attempt but the patient wanted to sleep for a prolonged period so this was discontinued   Pt is starting to hear and see things  Loss of appetite/nasuea: Started about 1 month ago after her mother got a diagnosed with stage 1 colon cancer  Her mother is 62years old  Feels nausea in the am which usually subsides  Has an IUD  Pregnancy test negative yesterday  Smokes marijuana on and off which exacerbates symptoms  Feeling short of breath with exertion  Having heat intolerance  No chest pain  Back pain: Started 1 month ago  In the right lower back  Exacerbated with sitting for long periods or standing for long periods  Using motrin 2 tablets daily  The following portions of the patient's history were reviewed and updated as appropriate: allergies, current medications, past family history, past medical history, past social history, past surgical history and problem list     Review of Systems   Constitutional: Negative for fever and unexpected weight change  HENT: Negative for ear pain, sore throat and trouble swallowing  Eyes: Negative for pain and visual disturbance  Respiratory: Negative for cough, chest tightness, shortness of breath and wheezing  Cardiovascular: Negative for chest pain  Gastrointestinal: Positive for nausea and vomiting  Negative for abdominal distention, abdominal pain, blood in stool, constipation and diarrhea  Endocrine: Negative for polydipsia and polyuria  Genitourinary: Negative for dysuria and hematuria  Musculoskeletal: Positive for back pain  Negative for myalgias  Skin: Negative for rash  Neurological: Negative for syncope and headaches  Psychiatric/Behavioral: Positive for hallucinations and sleep disturbance  Negative for suicidal ideas  The patient is nervous/anxious            PHQ-9 Depression Screening    PHQ-9:    Frequency of the following problems over the past two weeks:                Objective:      /60 (BP Location: Left arm, Patient Position: Sitting, Cuff Size: Standard)   Pulse 97   Temp 98 2 °F (36 8 °C) (Tympanic) Resp 17   Ht 5' 4" (1 626 m)   Wt 63 5 kg (140 lb)   SpO2 99%   BMI 24 03 kg/m²          Physical Exam  Constitutional:       Appearance: She is well-developed  HENT:      Head: Normocephalic and atraumatic  Right Ear: External ear normal       Left Ear: External ear normal       Mouth/Throat:      Pharynx: No oropharyngeal exudate  Eyes:      General: No scleral icterus  Conjunctiva/sclera: Conjunctivae normal       Pupils: Pupils are equal, round, and reactive to light  Neck:      Musculoskeletal: Normal range of motion and neck supple  Cardiovascular:      Rate and Rhythm: Normal rate and regular rhythm  Heart sounds: No murmur  No friction rub  No gallop  Pulmonary:      Effort: Pulmonary effort is normal  No respiratory distress  Breath sounds: Normal breath sounds  No wheezing or rales  Abdominal:      General: Bowel sounds are normal  There is no distension  Palpations: Abdomen is soft  There is no mass  Tenderness: There is no abdominal tenderness  There is no rebound  Musculoskeletal: Normal range of motion  General: No swelling, tenderness, deformity or signs of injury  Right lower leg: No edema  Left lower leg: No edema  Comments:   Straight leg raise negative   Skin:     General: Skin is warm and dry  Neurological:      Mental Status: She is alert and oriented to person, place, and time

## 2020-09-08 NOTE — PATIENT INSTRUCTIONS
Get blood work done in the am   Stop doxycycline for 2 weeks  When you restart, eat immediately when having symptoms of nausea  Discuss continued clonazepam usage with psychiatry  Try not to use every day  Start melatonin 5mg at bedtime

## 2020-09-09 ENCOUNTER — LAB (OUTPATIENT)
Dept: LAB | Facility: CLINIC | Age: 20
End: 2020-09-09
Payer: COMMERCIAL

## 2020-09-09 DIAGNOSIS — R11.2 NON-INTRACTABLE VOMITING WITH NAUSEA, UNSPECIFIED VOMITING TYPE: ICD-10-CM

## 2020-09-09 DIAGNOSIS — F41.1 GENERALIZED ANXIETY DISORDER: ICD-10-CM

## 2020-09-09 DIAGNOSIS — R53.83 OTHER FATIGUE: ICD-10-CM

## 2020-09-09 DIAGNOSIS — R63.4 WEIGHT LOSS: ICD-10-CM

## 2020-09-09 LAB
ALBUMIN SERPL BCP-MCNC: 4.2 G/DL (ref 3.5–5)
ALP SERPL-CCNC: 50 U/L (ref 46–116)
ALT SERPL W P-5'-P-CCNC: 20 U/L (ref 12–78)
ANION GAP SERPL CALCULATED.3IONS-SCNC: 7 MMOL/L (ref 4–13)
AST SERPL W P-5'-P-CCNC: 12 U/L (ref 5–45)
BASOPHILS # BLD AUTO: 0.04 THOUSANDS/ΜL (ref 0–0.1)
BASOPHILS NFR BLD AUTO: 1 % (ref 0–1)
BILIRUB SERPL-MCNC: 0.67 MG/DL (ref 0.2–1)
BUN SERPL-MCNC: 9 MG/DL (ref 5–25)
CALCIUM SERPL-MCNC: 9.5 MG/DL (ref 8.3–10.1)
CHLORIDE SERPL-SCNC: 108 MMOL/L (ref 100–108)
CO2 SERPL-SCNC: 24 MMOL/L (ref 21–32)
CREAT SERPL-MCNC: 0.97 MG/DL (ref 0.6–1.3)
EOSINOPHIL # BLD AUTO: 0.13 THOUSAND/ΜL (ref 0–0.61)
EOSINOPHIL NFR BLD AUTO: 2 % (ref 0–6)
ERYTHROCYTE [DISTWIDTH] IN BLOOD BY AUTOMATED COUNT: 12.5 % (ref 11.6–15.1)
GFR SERPL CREATININE-BSD FRML MDRD: 84 ML/MIN/1.73SQ M
GLUCOSE P FAST SERPL-MCNC: 79 MG/DL (ref 65–99)
HCG SERPL QL: NEGATIVE
HCT VFR BLD AUTO: 38 % (ref 34.8–46.1)
HGB BLD-MCNC: 12.8 G/DL (ref 11.5–15.4)
IMM GRANULOCYTES # BLD AUTO: 0.01 THOUSAND/UL (ref 0–0.2)
IMM GRANULOCYTES NFR BLD AUTO: 0 % (ref 0–2)
LYMPHOCYTES # BLD AUTO: 2.82 THOUSANDS/ΜL (ref 0.6–4.47)
LYMPHOCYTES NFR BLD AUTO: 44 % (ref 14–44)
MCH RBC QN AUTO: 30.6 PG (ref 26.8–34.3)
MCHC RBC AUTO-ENTMCNC: 33.7 G/DL (ref 31.4–37.4)
MCV RBC AUTO: 91 FL (ref 82–98)
MONOCYTES # BLD AUTO: 0.54 THOUSAND/ΜL (ref 0.17–1.22)
MONOCYTES NFR BLD AUTO: 9 % (ref 4–12)
NEUTROPHILS # BLD AUTO: 2.73 THOUSANDS/ΜL (ref 1.85–7.62)
NEUTS SEG NFR BLD AUTO: 44 % (ref 43–75)
NRBC BLD AUTO-RTO: 0 /100 WBCS
PLATELET # BLD AUTO: 286 THOUSANDS/UL (ref 149–390)
PMV BLD AUTO: 9.3 FL (ref 8.9–12.7)
POTASSIUM SERPL-SCNC: 4.3 MMOL/L (ref 3.5–5.3)
PROT SERPL-MCNC: 7.1 G/DL (ref 6.4–8.2)
RBC # BLD AUTO: 4.18 MILLION/UL (ref 3.81–5.12)
SODIUM SERPL-SCNC: 139 MMOL/L (ref 136–145)
TSH SERPL DL<=0.05 MIU/L-ACNC: 1.62 UIU/ML (ref 0.36–3.74)
WBC # BLD AUTO: 6.27 THOUSAND/UL (ref 4.31–10.16)

## 2020-09-09 PROCEDURE — 84703 CHORIONIC GONADOTROPIN ASSAY: CPT

## 2020-09-09 PROCEDURE — 36415 COLL VENOUS BLD VENIPUNCTURE: CPT

## 2020-09-09 PROCEDURE — 85025 COMPLETE CBC W/AUTO DIFF WBC: CPT

## 2020-09-09 PROCEDURE — 84443 ASSAY THYROID STIM HORMONE: CPT

## 2020-09-09 PROCEDURE — 80053 COMPREHEN METABOLIC PANEL: CPT

## 2020-09-10 ENCOUNTER — TELEPHONE (OUTPATIENT)
Dept: PSYCHIATRY | Facility: CLINIC | Age: 20
End: 2020-09-10

## 2020-09-10 NOTE — TELEPHONE ENCOUNTER
Raquel Scott called again and said she needs a sooner appointment with Lori Cargo  She is aware of her RTO on 9/25  Nish Viera could you see if Joe Thomas has anything sooner than 10/22 and call Raquel Scott to let her know if there is? Thanks!

## 2020-09-18 ENCOUNTER — TELEPHONE (OUTPATIENT)
Dept: PSYCHIATRY | Facility: CLINIC | Age: 20
End: 2020-09-18

## 2020-09-18 DIAGNOSIS — F90.2 ADHD (ATTENTION DEFICIT HYPERACTIVITY DISORDER), COMBINED TYPE: ICD-10-CM

## 2020-09-18 RX ORDER — DEXTROAMPHETAMINE SACCHARATE, AMPHETAMINE ASPARTATE MONOHYDRATE, DEXTROAMPHETAMINE SULFATE AND AMPHETAMINE SULFATE 2.5; 2.5; 2.5; 2.5 MG/1; MG/1; MG/1; MG/1
10 CAPSULE, EXTENDED RELEASE ORAL EVERY MORNING
Qty: 30 CAPSULE | Refills: 0 | Status: SHIPPED | OUTPATIENT
Start: 2020-09-18 | End: 2020-09-21 | Stop reason: SDUPTHER

## 2020-09-18 RX ORDER — DEXTROAMPHETAMINE SACCHARATE, AMPHETAMINE ASPARTATE MONOHYDRATE, DEXTROAMPHETAMINE SULFATE AND AMPHETAMINE SULFATE 2.5; 2.5; 2.5; 2.5 MG/1; MG/1; MG/1; MG/1
10 CAPSULE, EXTENDED RELEASE ORAL EVERY MORNING
Qty: 30 CAPSULE | Refills: 0
Start: 2020-09-18 | End: 2020-09-18 | Stop reason: SDUPTHER

## 2020-09-18 NOTE — TELEPHONE ENCOUNTER
Doris LVM on nursing line  She is requesting an appointment with Franco Jacinto soon after she RTO on the 25th  Front staff, please call Hay Betancourt to schedule an appointment  Will ask covering provider to review in [facility]  Next appointment 10/22/20  She is requesting a refill on her Adderall  LVM for Hay Betancourt stating front staff was sent message that she needs an appointment and advised there may be a delay due to moving to our new location  Also made aware will ask covering provider to refill medication   Advised she can discuss any medication changes with Itzne Rater at next appointment

## 2020-09-21 DIAGNOSIS — F90.2 ADHD (ATTENTION DEFICIT HYPERACTIVITY DISORDER), COMBINED TYPE: ICD-10-CM

## 2020-09-29 DIAGNOSIS — F41.1 GENERALIZED ANXIETY DISORDER: ICD-10-CM

## 2020-09-29 RX ORDER — CLONAZEPAM 0.5 MG/1
0.25 TABLET ORAL DAILY PRN
Qty: 10 TABLET | Refills: 0 | OUTPATIENT
Start: 2020-09-29

## 2020-09-29 NOTE — TELEPHONE ENCOUNTER
But she is set up with psych already and they are giving her other controlled medications  I gave her the clonazepam as a 1 time thing   I don't want to interfere with psychiatry's management and plan

## 2020-10-05 DIAGNOSIS — F29 PSYCHOSIS, UNSPECIFIED PSYCHOSIS TYPE (HCC): Primary | ICD-10-CM

## 2020-10-05 RX ORDER — DEXTROAMPHETAMINE SACCHARATE, AMPHETAMINE ASPARTATE MONOHYDRATE, DEXTROAMPHETAMINE SULFATE AND AMPHETAMINE SULFATE 2.5; 2.5; 2.5; 2.5 MG/1; MG/1; MG/1; MG/1
10 CAPSULE, EXTENDED RELEASE ORAL EVERY MORNING
Qty: 30 CAPSULE | Refills: 0 | Status: SHIPPED | OUTPATIENT
Start: 2020-10-05 | End: 2020-12-04 | Stop reason: SDUPTHER

## 2020-10-22 ENCOUNTER — TELEMEDICINE (OUTPATIENT)
Dept: PSYCHIATRY | Facility: CLINIC | Age: 20
End: 2020-10-22
Payer: COMMERCIAL

## 2020-10-22 DIAGNOSIS — F29 PSYCHOSIS, UNSPECIFIED PSYCHOSIS TYPE (HCC): Primary | ICD-10-CM

## 2020-10-22 DIAGNOSIS — F41.9 ANXIETY: ICD-10-CM

## 2020-10-22 PROCEDURE — 4004F PT TOBACCO SCREEN RCVD TLK: CPT | Performed by: NURSE PRACTITIONER

## 2020-10-22 PROCEDURE — 99214 OFFICE O/P EST MOD 30 MIN: CPT | Performed by: NURSE PRACTITIONER

## 2020-10-22 RX ORDER — ARIPIPRAZOLE 2 MG/1
2 TABLET ORAL DAILY
Qty: 90 TABLET | Refills: 0 | Status: SHIPPED | OUTPATIENT
Start: 2020-10-22 | End: 2021-01-22 | Stop reason: SDUPTHER

## 2020-10-22 RX ORDER — PROPRANOLOL HYDROCHLORIDE 10 MG/1
10 TABLET ORAL 2 TIMES DAILY
Qty: 180 TABLET | Refills: 0 | Status: SHIPPED | OUTPATIENT
Start: 2020-10-22 | End: 2020-12-04

## 2020-11-16 DIAGNOSIS — L70.0 ACNE VULGARIS: ICD-10-CM

## 2020-11-16 RX ORDER — DOXYCYCLINE HYCLATE 100 MG/1
CAPSULE ORAL
Qty: 30 CAPSULE | Refills: 0 | Status: SHIPPED | OUTPATIENT
Start: 2020-11-16 | End: 2020-12-16

## 2020-12-04 ENCOUNTER — TELEMEDICINE (OUTPATIENT)
Dept: PSYCHIATRY | Facility: CLINIC | Age: 20
End: 2020-12-04
Payer: COMMERCIAL

## 2020-12-04 DIAGNOSIS — F41.9 ANXIETY: ICD-10-CM

## 2020-12-04 DIAGNOSIS — F90.2 ADHD (ATTENTION DEFICIT HYPERACTIVITY DISORDER), COMBINED TYPE: Primary | ICD-10-CM

## 2020-12-04 DIAGNOSIS — F29 PSYCHOSIS, UNSPECIFIED PSYCHOSIS TYPE (HCC): ICD-10-CM

## 2020-12-04 PROCEDURE — 99214 OFFICE O/P EST MOD 30 MIN: CPT | Performed by: NURSE PRACTITIONER

## 2020-12-04 PROCEDURE — 4004F PT TOBACCO SCREEN RCVD TLK: CPT | Performed by: NURSE PRACTITIONER

## 2020-12-04 RX ORDER — PROPRANOLOL HYDROCHLORIDE 10 MG/1
15 TABLET ORAL 2 TIMES DAILY
Qty: 270 TABLET | Refills: 0 | Status: SHIPPED | OUTPATIENT
Start: 2020-12-04 | End: 2021-01-15

## 2020-12-04 RX ORDER — DEXTROAMPHETAMINE SACCHARATE, AMPHETAMINE ASPARTATE MONOHYDRATE, DEXTROAMPHETAMINE SULFATE AND AMPHETAMINE SULFATE 2.5; 2.5; 2.5; 2.5 MG/1; MG/1; MG/1; MG/1
10 CAPSULE, EXTENDED RELEASE ORAL EVERY MORNING
Qty: 30 CAPSULE | Refills: 0 | Status: SHIPPED | OUTPATIENT
Start: 2020-12-04 | End: 2021-01-18 | Stop reason: SDUPTHER

## 2020-12-16 ENCOUNTER — TELEPHONE (OUTPATIENT)
Dept: PSYCHIATRY | Facility: CLINIC | Age: 20
End: 2020-12-16

## 2020-12-17 DIAGNOSIS — L70.0 CYSTIC ACNE: ICD-10-CM

## 2020-12-18 RX ORDER — SPIRONOLACTONE 25 MG/1
TABLET ORAL
Qty: 30 TABLET | Refills: 8 | Status: SHIPPED | OUTPATIENT
Start: 2020-12-18 | End: 2021-07-26

## 2021-01-08 DIAGNOSIS — F29 PSYCHOSIS, UNSPECIFIED PSYCHOSIS TYPE (HCC): ICD-10-CM

## 2021-01-08 RX ORDER — BREXPIPRAZOLE 1 MG/1
TABLET ORAL
Qty: 30 TABLET | Refills: 2 | Status: SHIPPED | OUTPATIENT
Start: 2021-01-08 | End: 2021-04-26

## 2021-01-11 ENCOUNTER — TELEMEDICINE (OUTPATIENT)
Dept: DERMATOLOGY | Facility: CLINIC | Age: 21
End: 2021-01-11
Payer: COMMERCIAL

## 2021-01-11 DIAGNOSIS — L70.0 ACNE VULGARIS: Primary | ICD-10-CM

## 2021-01-11 PROCEDURE — 4004F PT TOBACCO SCREEN RCVD TLK: CPT | Performed by: DERMATOLOGY

## 2021-01-11 PROCEDURE — 99213 OFFICE O/P EST LOW 20 MIN: CPT | Performed by: DERMATOLOGY

## 2021-01-11 NOTE — PROGRESS NOTES
Virtual Regular Visit      Assessment/Plan:    Problem List Items Addressed This Visit     None               Reason for visit is   Chief Complaint   Patient presents with    Virtual Regular Visit        Encounter provider Jay Jay Hale MD    Provider located at [address]  [phone]      Recent Visits  No visits were found meeting these conditions  Showing recent visits within past 7 days and meeting all other requirements     Future Appointments  No visits were found meeting these conditions  Showing future appointments within next 150 days and meeting all other requirements        The patient was identified by name and date of birth  Aurora Gaspar was informed that this is a telemedicine visit and that the visit is being conducted through [employer] and patient was informed that this is a secure, HIPAA-compliant platform  She agrees to proceed     My office door was closed  The patient was notified the following individuals were present in the room [facility]  She acknowledged consent and understanding of privacy and security of the video platform  The patient has agreed to participate and understands they can discontinue the visit at any time  Patient is aware this is a billable service  Subjective  Aurora Gaspar is a 23 y o  female          HPI     Past Medical History:   Diagnosis Date    ADHD (attention deficit hyperactivity disorder)     Borderline personality disorder ([facility] [address] )     Depression     Fracture of carpal bones     Hypothyroidism     Hypothyroidism (acquired)     PTSD (post-traumatic stress disorder)     Suicide and self-inflicted injury ([facility] [address] ) 29/0595    Suicide attempt [facility])        Past Surgical History:   Procedure Laterality Date    WISDOM TOOTH EXTRACTION         Current Outpatient Medications   Medication Sig Dispense Refill    Adapalene-Benzoyl Peroxide 0 3-2 5 % GEL Apply 1 application topically daily at bedtime 45 g 3    amphetamine-dextroamphetamine (ADDERALL XR) 10 MG 24 hr capsule Take 1 capsule (10 mg total) by mouth every morningMax Daily Amount: 10 mg 30 capsule 0    ARIPiprazole (ABILIFY) 2 mg tablet Take 1 tablet (2 mg total) by mouth daily 90 tablet 0    clindamycin (CLEOCIN T) 1 % external solution Apply topically 2 (two) times a day 30 mL 2    clonazePAM (KlonoPIN) 0 5 mg tablet Take 0 5 tablets (0 25 mg total) by mouth daily as needed for anxiety 10 tablet 0    fluticasone (FLONASE) 50 mcg/act nasal spray 1 spray into each nostril daily 1 Bottle 2    levonorgestrel (MIRENA) 20 MCG/24HR IUD 1 each by Intrauterine route once      levothyroxine 50 mcg tablet Take 1 tablet (50 mcg total) by mouth daily 90 tablet 1    Rexulti 1 MG tablet TAKE 1 TABLET BY MOUTH EVERY DAY 30 tablet 2    spironolactone (ALDACTONE) 25 mg tablet TAKE 1 TABLET BY MOUTH EVERY DAY 30 tablet 8    doxycycline monohydrate (MONODOX) 100 mg capsule Take 100 mg by mouth 2 (two) times a day      ondansetron (ZOFRAN) 4 mg tablet Take 1 tablet (4 mg total) by mouth every 8 (eight) hours as needed for nausea or vomiting (Patient not taking: Reported on 1/11/2021) 15 tablet 0    propranolol (INDERAL) 10 mg tablet Take 1 5 tablets (15 mg total) by mouth 2 (two) times a day (Patient not taking: Reported on 1/11/2021) 270 tablet 0     No current facility-administered medications for this visit  No Known Allergies    Review of Systems    Video Exam    There were no vitals filed for this visit  Physical Exam     I spent 6 minutes directly with the patient during this visit      VIRTUAL VISIT DISCLAIMER    Gwen Grewal acknowledges that she has consented to an online visit or consultation   She understands that the online visit is based solely on information provided by her, and that, in the absence of a face-to-face physical evaluation by the physician, the diagnosis she receives is both limited and provisional in terms of accuracy and completeness  This is not intended to replace a full medical face-to-face evaluation by the physician  Aidee Patrick understands and accepts these terms  [facility] Follow Up Note    Patient Name: Aidee Nguyen  Encounter Date: 1 11 2021    Today's Chief Concerns:   Concern #1:   Acne follow up   Concern #2:     Concern #3:      Current Medications:    Current Outpatient Medications:     Adapalene-Benzoyl Peroxide 0 3-2 5 % GEL, Apply 1 application topically daily at bedtime, Disp: 45 g, Rfl: 3    amphetamine-dextroamphetamine (ADDERALL XR) 10 MG 24 hr capsule, Take 1 capsule (10 mg total) by mouth every morningMax Daily Amount: 10 mg, Disp: 30 capsule, Rfl: 0    ARIPiprazole (ABILIFY) 2 mg tablet, Take 1 tablet (2 mg total) by mouth daily, Disp: 90 tablet, Rfl: 0    clindamycin (CLEOCIN T) 1 % external solution, Apply topically 2 (two) times a day, Disp: 30 mL, Rfl: 2    clonazePAM (KlonoPIN) 0 5 mg tablet, Take 0 5 tablets (0 25 mg total) by mouth daily as needed for anxiety, Disp: 10 tablet, Rfl: 0    fluticasone (FLONASE) 50 mcg/act nasal spray, 1 spray into each nostril daily, Disp: 1 Bottle, Rfl: 2    levonorgestrel (MIRENA) 20 MCG/24HR IUD, 1 each by Intrauterine route once, Disp: , Rfl:     levothyroxine 50 mcg tablet, Take 1 tablet (50 mcg total) by mouth daily, Disp: 90 tablet, Rfl: 1    Rexulti 1 MG tablet, TAKE 1 TABLET BY MOUTH EVERY DAY, Disp: 30 tablet, Rfl: 2    spironolactone (ALDACTONE) 25 mg tablet, TAKE 1 TABLET BY MOUTH EVERY DAY, Disp: 30 tablet, Rfl: 8    doxycycline monohydrate (MONODOX) 100 mg capsule, Take 100 mg by mouth 2 (two) times a day, Disp: , Rfl:     ondansetron (ZOFRAN) 4 mg tablet, Take 1 tablet (4 mg total) by mouth every 8 (eight) hours as needed for nausea or vomiting (Patient not taking: Reported on 1/11/2021), Disp: 15 tablet, Rfl: 0    propranolol (INDERAL) 10 mg tablet, Take 1 5 tablets (15 mg total) by mouth 2 (two) times a day (Patient not taking: Reported on 1/11/2021), Disp: 270 tablet, Rfl: 0    CONSTITUTIONAL:   There were no vitals filed for this visit  Specific Alerts:    Have you been seen by a [facility]ologist in the last 3 years? YES    Are you pregnant or planning to become pregnant? No    Are you currently or planning to be nursing or breast feeding? No    No Known Allergies    May we call your Preferred Phone number to discuss your specific medical information? YES    May we leave a detailed message that includes your specific medical information? YES    Have you traveled outside of the [facility] in the past 3 months? No    Do you currently have a pacemaker or defibrillator? No    Do you have any artificial heart valves, joints, plates, screws, rods, stents, pins, etc? No   - If Yes, were any placed within the last 2 years? Do you require any medications prior to a surgical procedure? No   - If Yes, for which procedure? - If Yes, what medications to you require? Are you taking any medications that cause you to bleed more easily ("blood thinners") No    Have you ever experienced a rapid heartbeat with epinephrine? No    Have you ever been treated with "gold" (gold sodium thiomalate) therapy? [facility] can help with wrinkles, "laugh lines," facial volume loss, "double chin," "love handles," age spots, and more  Are you interested in learning today about some of the skin enhancement procedures that we offer? (If Yes, please provide more detail)     Review of Systems:  Have you recently had or currently have any of the following?     · Fever or chills: No  · Night Sweats: No  · Headaches: No  · Weight Gain: No  · Weight Loss: No  · Blurry Vision: No  · Nausea: No  · Vomiting: No  · Diarrhea: No  · Blood in Stool: No  · Abdominal Pain: No  · Itchy Skin: No  · Painful Joints: No  · Swollen Joints: No  · Muscle Pain: No  · Irregular Mole: No  · Sun Burn: No  · Dry Skin: No  · Skin Color Changes: No  · Scar or Keloid: No  · Cold Sores/Fever Blisters: No  · Bacterial Infections/MRSA: No  · Anxiety: No  · Depression: No  · Suicidal or Homicidal Thoughts: No    PSYCH: Normal mood and affect  EYES: Normal conjunctiva  ENT: Normal lips and oral mucosa  CARDIOVASCULAR: No edema  RESPIRATORY: Normal respirations  HEME/LYMPH/IMMUNO:  No regional lymphadenopathy except as noted below in ASSESSMENT AND PLAN BY DIAGNOSIS    FULL ORGAN SYSTEM SKIN EXAM (SKIN)   Hair, Scalp, Ears, Face Normal except as noted below in Assessment   Neck, Cervical Chain Nodes Normal except as noted below in Assessment   Right Arm/Hand/Fingers Normal except as noted below in Assessment   Left Arm/Hand/Fingers Normal except as noted below in Assessment   Chest/Breasts/Axillae Viewed areas Normal except as noted below in Assessment   Abdomen, Umbilicus Normal except as noted below in Assessment   Back/Spine Normal except as noted below in Assessment   Groin/Genitalia/Buttocks Viewed areas Normal except as noted below in Assessment   Right Leg, Foot, Toes Normal except as noted below in Assessment   Left Leg, Foot, Toes Normal except as noted below in Assessment         ACNE VULGARIS ("COMMON ACNE"): FOLLOW UP    Physical Exam:   Psychiatric/Mood: Stable   Anatomic Location Affected: Face and back   Morphological Description:  o Open/Closed Comedones:  - Many ("Severe")  o Inflammatory Papules/Pustules:  - Many ("Severe")  o Nodules:  - Many ("Severe")  o Scarring:  - Several ("Moderate")  o Excoriations:  - Several ("Moderate")  o Local Skin Redness/Erythema:  - Several ("Moderate")  o Local Skin Dryness/Scaling:  - Few ("Mild")  o Local Skin Dyspigmentation:  - Few ("Mild")   Pertinent Positives:   Pertinent Negatives:     Additional History of Present Condition:     Previous Treatment Plan: Spironolactone, doxycycline, adapalene, clindamycin   Are you happy with the improvement: somewhat    Assessment and Plan:  · Plan to start Isotretinoin in 30 days  · Nurse visit scheduled for 1/11/2021 to complete pregnancy test      REMEMBER:  Always take your acne pills with lots of water! A pill stuck in your throat can cause significant burning and irritation  Drink a full glass of water to ensure the pill gets into your stomach  Avoid popping a pill right before bed, and stay upright for at least 1 hour after taking a pill  ACNE:  WHAT ZIT ALL ABOUT? WHY DO I HAVE ACNE/PIMPLES? Your skin is made of layers  To keep the skin from becoming dry and cracked, the skin needs oil  The oil is made in little wells in the deeper layers in the skin  People with acne have glands that make more oil and are more easily plugged, causing the glands to swell  Hormones, bacteria and your inherited tendency to have acne all play a role  The medical term for pimples is acne or acne vulgaris (vulgaris means common)  Most people get some acne  Acne does not come from being dirty  Instead, it is an expected consequence of changes that occur during normal growth and development  Hormones, bacteria, and your family's tendency to have acne may all play a role  Whiteheads or blackheads are openings of the glands (glands are the oil factories) onto the surface of the skin  Blackheads are not caused by dirt blocking the pores; instead, they result from the oxidation reaction of oil and skin in the pores with the air (like a rust reaction)  WHAT ABOUT STRESS? Stress does not cause acne but it can make it worse  Make sure you get enough sleep and daily exercise! WHAT ABOUT FOODS/DIET? Try to eat a balanced, healthy diet  Some people feel that certain foods worsen their acne  While there aren't many studies available on this question, severe dietary changes are unlikely to help your acne and may be harmful to the health of your skin   If you find that a certain food seems to aggravate your acne, you may consider avoiding that food  Discuss this with your physician! WHAT CAUSES MY ACNE? There are four contributors to acne--the body's natural oil (sebum), clogged pores, bacteria (with the scientific name Propionibacterium acnes, or P  acnes, for short), and the body's reaction to the bacteria living in the clogged pores (which causes inflammation)  Here's what happens:     Sebum is produced in the normal oil-making glands in the deeper layers of the skin and reaches the surface through the skin's pores  An increase in certain hormones occurs around the time of puberty, and these hormones trigger the oil glands to produce increased amounts of sebum   Pores with excess oil tend to become clogged more easily   At the same time, P  acnes--one of the many types of bacteria that normally live on everyone's skin--thrives in the excess oil and causes a skin reaction (inflammation)   If a pore is clogged close to the surface, there is little inflammation  However, this results in the formation of whiteheads (closed comedones) or blackheads (open comedones) at the surface of the skin   A plug that extends to, or forms a little deeper in the pore, or one that enlarges or ruptures may cause more inflammation  The result is red bumps (papules) and pus-filled pimples (pustules)   If plugging happens in the deepest skin layer, the inflammation may be even more severe, resulting in the formation of nodules or cysts  When these types of acne heal, they may leave behind discolored areas or true scars  SKIN HYGIENE:  HOW SHOULD I [address] MY SKIN? Acne does not come from being dirty, however, washing your face is part of taking good care of your skin and will help keep your face clear  Good skin hygiene is, therefore, critical to support any acne treatment plan    Here are several specific suggestions for practicing good skin hygiene and keeping your skin looking its best:     You should wash acne-prone skin TWICE A DAY: Once in the morning and once in the evening  This does include any showers you take that day, so do not overdo it!  Do not scrub the skin with a washcloth or loofah as these can irritate and inflame your acne  Acne does not come from dirt, so it is not necessary to scrub the skin clean  In fact, scrubbing may lead to dryness and irritation that makes the acne even worse and harder for patients to tolerate acne medications   Use a gentle facial moisturizing cleanser (Cetaphil Moisturizing Cleanser or Dove Fragrance-Free bar)  Avoid using soaps like Chitra Stroud, [address], [address], or soft/liquid soaps as these products will dry your skin   Do not use any over-the-counter acne washes without your doctor's specific instruction to do so  These products often contain salicylic acid or benzoyl peroxide  These ingredients can be helpful in clearing oil from the skin and reducing bacteria, but they may also be drying and can add to irritation   Do not use exfoliating products with microbeads or brushes as these can cause irritation to the skin   Facials and other treatments to remove, squeeze, or clean out pores are not recommended  Manipulating the skin in this way can make acne worse and can lead to severe infections and/or scarring  It also increases the likelihood that the skin will not be able to tolerate acne medications   Try not to pop pimples or pick at your acne as this can delay healing and may result in scarring or skin color changes (dark spots) that are often more noticeable than the acne itself  Picking/popping acne can also cause a serious skin infection   Wash or change your pillow case once to twice a week, especially if you use products in your hair   Wash the skin as soon as possible after playing sports or other activities that cause a lot of sweating   Also, pay attention to how your sports equipment (shoulder pads, helmet strap, etc ) might be making your acne worse   When you use makeup, moisturizer, or sunscreen make sure that these products are labeled non-comedogenic, or won't clog pores, or won't cause acne         SHOULD I TREAT MY ACNE? There are a number of other skin conditions that can look like acne  If there is any question about the diagnosis, then the person should be evaluated by a board certified pediatric and adolescent dermatologist   A physician should examine any child with acne who is between the ages of 3and 9years of age, as acne in this mid-childhood age group is not normal and may signal an underlying problem  If a preadolescent (9to 6years of age) or adolescent (15to 25years of age) has mild acne and the condition is not bothersome to the individual, proper and regular skin care (what your doctor may call skin hygiene) may be all that is needed at this point  Many people do, however, need specific acne medications to help their skin look and feel its best  Your doctor will tell you if you are one of these people  If so, you may be advised to use an over-the-counter or prescription medication that is applied to the skin (a topical medication) or if the addition of an oral medication (a medication taken by [employer]) is needed  The good news is that the medications work well when used properly! Some specific factors that may influence the choice of acne therapy include:     Severity  The number and type of skin lesions (papules or comedones) and the degree of inflammation (mild, moderate or severe)   Scarring  Scarring is most common when acne is severe, but it can happen even in children with mild acne   Impact  If a child is experiencing emotional complications because of the acne or is experiencing negative comments from other children   Cost of the acne medications    An acne expert can help to keep out of pocket costs to a minimum by utilizing the correct medications and the least expensive options   The patient's skin type (oily versus dry or combination skin, for example)   Potential side effects of the medication   The ease or overall complexity of the treatment plan or medication  WHAT ACNE TREATMENTS ARE AVAILABLE? Medications for acne try to stop the formation of new pimples by reducing or removing the oil, bacteria, and other things (like dead skin cells) that clog the pores  They can also decrease the inflammation or irritation response of the skin to bacteria  It may take from 6 to 8 weeks (about 2 months!) before you see any improvement and know if the medication is effective  It takes the layers of skin this long to regenerate  Remember, these medications do not cure the condition--the acne improves because of the medication  Therefore, treatment must be continued in order to prevent the return of acne lesions  There are many types of acne treatments  Some are applied to the skin (topical medications) and some are taken by mouth (oral medications)  In most cases of mild acne, the doctor will start with a topical medication  There are many different topical medications that are helpful for acne  If acne is more severe and it does not respond adequately to a topical medication, or if it covers large body surface areas such as the back and/or chest, oral antibiotics such as Doxycycline or Minocycline and/or oral hormone therapy such as Oral Contraceptive Pills or Spironolactone may be prescribed  In the most severe cases, isotretinoin (Accutane) may be used  In general, it is usually best to start with acne medications that are least likely to cause side effects but are at the same time capable of addressing the specific causes for the acne   Some patients have a good result with just one medication, but many will need to use a combination of treatments: two or more different topical agents or an oral medication plus a topical medication  Another treatment used for acne may include corticosteroid injections, which are used to help relieve pain, decrease the size, and encourage the healing of large, inflamed acne nodules  Also, dermatologists sometimes perform acne surgery, using a fine needle, a pointed blade, or an instrument known as a comedone extractor to mechanically clean out clogged pores  One must always weigh the risk for inducing a scar with the potential benefits of any procedure  Prior treatment with topical retinoids can loosen whiteheads and blackheads and make it easier to physically remove such lesions  Heat-based devices, and light and laser therapy are being studied to see whether there is any role for such treatments in mild to moderate acne  At this time, there is not enough evidence to make general recommendations about their use  TOPICAL ACNE MEDICATIONS    WHAT KIND OF TOPICALS ARE THERE?  Benzoyl peroxide (BP) helps to fight inflammation and is anti-microbial (kills bacteria, viruses, and other microorganisms) and is believed to help prevent resistance of bacteria to topical antibiotics  A benzoyl peroxide wash may be recommended for use on large areas such as the chest and/or back  Mild irritation and dryness are common when first using benzoyl peroxide-containing products  Be careful because benzoyl peroxide can bleach towels and clothing!  Retinoids (such as adapalene, tretinoin, or tazarotene) unplug the oil glands by helping peel away the layers of skin and other things plugging the opening of the glands  Mild irritation and dryness are common when first using these products  Facial waxing and other skin procedures can lead to excessive irritation and should be avoided during retinoid therapy   Antibiotics fight bacteria and help decrease inflammation   Topical antibiotics commonly used in acne include clindamycin, erythromycin, and combination agents (such as clindamycin/benzoyl peroxide or erythromycin/benzoyl peroxide)  Mild irritation and dryness are common when first using these products  Typically, topical antibiotics should not be used alone as treatment for acne   Other topical agents include salicylic acid, azelaic acid, dapsone, and sulfacetamide  Mild irritation and dryness can also occur when first using these products  USING YOUR TOPICAL TREATMENTS LIKE A PRO   Apply topical medications only to clean, dry skin  Topical medications may lead to significant dryness of the affected areas  To minimize this, wait 15-20 minutes after washing before applying your topical medication   These medications work deep in the skin to prevent new breakouts  Spot treatment of individual pimples does not do much  When applying topical medications to the face, use the 5-dot method  Start by placing a small pea-sized amount of the medication on your finger  Then, place dots in each of five locations of your face: Mid-forehead, each cheek, nose, and chin  Next, rub the medication into the entire area of skin - not just on individual pimples! Try to avoid the delicate skin around your eyes and corners of your mouth   The medications are not magic! They take weeks if not months to work  Be patient and use your medicine on a daily basis or as directed for six weeks before asking if your skin looks better  Try not to miss more than one or two days each week when using your medications   If you are starting a new medication, then try using it every other night or even every third night   Gradually work up to Balbir & Snehal a day    This will give your skin time to adjust    The same medications often come in various forms or formulations: Creams, ointments, lotions, gels, microspheres, or foams  Use the formulation that has been recommended and don't switch to other forms unless instructed   Some forms (such as alcohol based gels) may be more drying and less tolerable for certain skin types    Sometimes individual medications are not as effective as a combination of two or more agents  The doctor may need to try several medications or combinations before finding the one that is best for that patient   Moisturizer, sunscreen, and make-up may be used in conjunction with topical acne medications  In general, acne medications are applied first so they may directly contact the skin  Ask your physician to review specific application instructions!  It is especially important to always use sunscreen when using a topical retinoid or oral antibiotic  These drugs can make your skin more sensitive to the sun  In general, sunscreen gets applied AFTER any acne medications   Don't stop using your acne medications just because your acne got better  Remember, the acne is better because of the medication, and prevention is the james to treatment  ORAL ACNE MEDICATIONS    ORAL ANTIBIOTICS  Antibiotics include tetracycline-class medicines (which include the most commonly used oral antibiotics for acne, minocycline, and doxycycline), erythromycin, trimethoprim-sulfamethoxazole, and occasionally cephalexin or azithromycin  These drugs may decrease bacteria and inflammation, and they are most effective for moderate-to-severe inflammatory acne  A product containing benzoyl peroxide should be used along with these antibiotics to help decrease the possibility of microbial resistance  Always take your acne pills with lots of water! A pill stuck in your throat can cause significant burning and irritation  Drink a full glass of water to ensure the pill gets into your stomach  Avoid popping a pill right before bed, and stay upright for at least 1 hour after taking a pill  DOXYCYCLINE   This medication is usually taken ONCE or TWICE per day, as instructed by your physician  NOTE: Always take this medication with lots of water! A pill stuck in the throat can cause significant burning and irritation  Avoid popping a pill right before bed & stay upright for at least one hour after taking a pill  WARNING: Doxycycline increases your sensitivity to the sun, so practice excellent sun protection! If you notice any of the following, stop using the medication and notify your health care provider: headaches; blurred vision; dizziness; sun sensitivity; heartburn-stomach pain; irritation of the esophagus; darkening of scars, gums, or teeth (more often with minocycline); nail changes; yellowing of the eyes or skin (indicating possible liver disease); joint pains-and flu-like symptoms  Taking oral antibiotics with food may help with symptoms of upset stomach  COMMON SIDE EFFECTS: Headaches; dizziness; sun sensitivity; irritation of the throat; discoloration of scars, gums, or teeth; nail changes  MINOCYCLINE   This medication is usually taken ONCE or TWICE per day, as instructed by your physician  NOTE: Always take this medication with lots of water! A pill stuck in the throat can cause significant burning and irritation  Avoid popping a pill right before bed & stay upright for at least one hour after taking a pill  WARNING: Though less likely than doxycycline, minocycline may increase your sensitivity to the sun, so practice excellent sun protection! If you notice any of the following, stop using the medication and notify your health care provider: headaches; blurred vision; dizziness; sun sensitivity; heartburn-stomach pain; irritation of the throat; darkening of scars, gums, or teeth; nail changes; yellowing of the eyes or skin (indicating possible liver disease); joint pains-and flu-like symptoms  Taking oral antibiotics with food may help with symptoms of upset stomach  COMMON SIDE EFFECTS: Headaches; dizziness; sun sensitivity; irritation of the throat; discoloration of scars, gums, or teeth (often with minocycline); nail changes     Minocycline can rarely cause liver disease, joint pains, severe skin rashes, and flu-like symptoms  If you should notice yellowing of the eyes or skin, or any of the above, notify your doctor and stop using the medication immediately  HORMONAL THERAPY  Hormonal treatment is used only in females and usually consists of oral contraceptives (birth control pills)  Spironolactone is also sometimes used  ORAL CONTRACEPTIVE PILLS   This medication is also known as the Birth Control Pill    We use it for hormonal regulation of acne  Take this medication as directed on the medication packet  NOTE: Try to find a regular time in your day to take the pill so that you don't forget  The best time is about half an hour after a meal or snack, or at bedtime  If you do forget to take your daily pill at the regular time, take one as soon as you remember and take the next at your regular scheduled time  WARNING: Do not take this medication until discussing it with your physician if you smoke, are pregnant (or trying to become pregnant or could be pregnant), have a personal history of breast cancer, have any artificial hardware or implants, have a condition called Factor 5 Leiden deficiency, have a family history of clotting problems, regularly have migraine headaches (especially with aura or due to flashing lights), or have any vaginal bleeding other than that associated with your menstrual cycle  ORAL ISOTRETINOIN (used to be called the brand name Darene Marky)  Isotretinoin, a derivative of vitamin A, is a powerful drug with several significant potential side effects  It is reserved for acne which is severe or when other medications have not worked well enough  It used to be sold under the brand name Accutane but now several versions exist       HAVING PROBLEMS WITH ANY OF YOUR TREATMENTS? You should not be able to see any of the medicines on your face   If you can see a white film on your skin after you apply the medication, there is too much medicine in that area and you need to apply a thinner coat and make sure it is spread evenly on your face  If your skin gets too dry, you can apply a light (non-comedogenic) moisturizer on top of your medicine or you may switch to using the medicine every other day instead of every day  If your skin is still too irritated, you may need to switch to a milder medication  If your skin is red and very itchy, you may be allergic to the medication and you should stop using it  COMMON POSSIBLE SIDE EFFECTS OF MEDICATIONS     Retinoids - dryness, redness, increased sun sensitivity   Benzoyl peroxide - drying, redness, bleaching of clothes, towels and sheets, allergy   Doxycycline - headaches; dizziness; irritation of the throat; nail changes; discoloration of teeth   Sun sensitivity - even if you have dark skin, this medicine can make you burn more easily  Make sure you protect yourself from the sun, either by avoiding being outside between 11 AM and 3 PM, wearing and reapplying sunscreen/sunblock, or wearing sun protective clothing   Nausea/vomiting - if you experience nausea with this medication, take it with food   Minocycline - headaches; dizziness; vision problems,  irritation of the throat; discoloration of scars, gums, or teeth  Can rarely cause liver disease, joint pains, and flu-like symptoms   If you should notice yellowing of the skin or any of the above, notify your doctor and stop using the medication   Birth Control Pills - nausea; headaches; breast tenderness; feeling bloated; mood changes   Spotting between periods may occur for the first three weeks of the medication, but this is not serious  It may last for two or three cycles  Please call us if the bleeding is heavier than a light flow or lasts for more than a few days  WHEN AND WHERE TO CALL WITH CONCERNS  We are here to help!   If you experience any unusual symptoms, then stop taking or using the medication and call our office at [phone])  It is better to be safe than to be sorry!       Scribe Attestation    I,:  Barbie Stone am acting as a scribe while in the presence of the attending physician :       I,:  Sammy Barkley MD personally performed the services described in this documentation    as scribed in my presence :

## 2021-01-12 ENCOUNTER — OFFICE VISIT (OUTPATIENT)
Dept: DERMATOLOGY | Facility: CLINIC | Age: 21
End: 2021-01-12
Payer: COMMERCIAL

## 2021-01-12 ENCOUNTER — TELEPHONE (OUTPATIENT)
Dept: DERMATOLOGY | Facility: CLINIC | Age: 21
End: 2021-01-12

## 2021-01-12 DIAGNOSIS — Z79.899 HIGH RISK MEDICATION USE: Primary | ICD-10-CM

## 2021-01-12 PROCEDURE — 81025 URINE PREGNANCY TEST: CPT | Performed by: DERMATOLOGY

## 2021-01-12 NOTE — TELEPHONE ENCOUNTER
Perfect! Her appointment is scheduled on 02/11/21 for a virtual visit  Thanks! Could you just sign her lab orders and her POCT pregnancy test? I will put in the negative result once signed  Thank you so much!

## 2021-01-12 NOTE — TELEPHONE ENCOUNTER
The lab order isn't coming up for me to sign  I did attest the note  Epic may be slow to upload it  I didn't get yesterday's until this morning

## 2021-01-12 NOTE — TELEPHONE ENCOUNTER
Please sign patient up to [address]  Patient came in today to sign the forms and to complete urine pregnancy test  I pended the lab orders for you to sign as well  I scanned her forms into the chart  Thanks!

## 2021-01-12 NOTE — TELEPHONE ENCOUNTER
Patient is registered in [facility], her next appointment can't be sooner than 2/11/21

## 2021-01-12 NOTE — PROGRESS NOTES
Patient here today to sign Accutane forms and complete first pregnancy test in office today  Patient signed forms  Pregnancy test negative  Labs ordered to get in 30 days  Next appointment already scheduled

## 2021-01-13 DIAGNOSIS — F41.9 ANXIETY: ICD-10-CM

## 2021-01-13 LAB — SL AMB POCT URINE HCG: NEGATIVE

## 2021-01-15 RX ORDER — PROPRANOLOL HYDROCHLORIDE 10 MG/1
TABLET ORAL
Qty: 180 TABLET | Refills: 0 | Status: SHIPPED | OUTPATIENT
Start: 2021-01-15 | End: 2021-02-05

## 2021-01-18 ENCOUNTER — TELEPHONE (OUTPATIENT)
Dept: DERMATOLOGY | Facility: CLINIC | Age: 21
End: 2021-01-18

## 2021-01-18 DIAGNOSIS — Z79.899 HIGH RISK MEDICATION USE: Primary | ICD-10-CM

## 2021-01-18 DIAGNOSIS — F90.2 ADHD (ATTENTION DEFICIT HYPERACTIVITY DISORDER), COMBINED TYPE: ICD-10-CM

## 2021-01-19 RX ORDER — DEXTROAMPHETAMINE SACCHARATE, AMPHETAMINE ASPARTATE MONOHYDRATE, DEXTROAMPHETAMINE SULFATE AND AMPHETAMINE SULFATE 2.5; 2.5; 2.5; 2.5 MG/1; MG/1; MG/1; MG/1
10 CAPSULE, EXTENDED RELEASE ORAL EVERY MORNING
Qty: 30 CAPSULE | Refills: 0 | Status: SHIPPED | OUTPATIENT
Start: 2021-01-19 | End: 2021-02-26 | Stop reason: SDUPTHER

## 2021-01-22 DIAGNOSIS — F29 PSYCHOSIS, UNSPECIFIED PSYCHOSIS TYPE (HCC): ICD-10-CM

## 2021-01-22 RX ORDER — ARIPIPRAZOLE 2 MG/1
2 TABLET ORAL DAILY
Qty: 90 TABLET | Refills: 0 | Status: SHIPPED | OUTPATIENT
Start: 2021-01-22 | End: 2021-04-26

## 2021-02-05 ENCOUNTER — TELEMEDICINE (OUTPATIENT)
Dept: PSYCHIATRY | Facility: CLINIC | Age: 21
End: 2021-02-05
Payer: COMMERCIAL

## 2021-02-05 DIAGNOSIS — G47.00 INSOMNIA, UNSPECIFIED TYPE: Primary | ICD-10-CM

## 2021-02-05 PROCEDURE — 99214 OFFICE O/P EST MOD 30 MIN: CPT | Performed by: NURSE PRACTITIONER

## 2021-02-05 RX ORDER — MIRTAZAPINE 7.5 MG/1
7.5 TABLET, FILM COATED ORAL
Qty: 90 TABLET | Refills: 0 | Status: SHIPPED | OUTPATIENT
Start: 2021-02-05 | End: 2021-04-02

## 2021-02-05 NOTE — BH TREATMENT PLAN
TREATMENT PLAN (Medication Management Only)        [facility]    Name and Date of Birth:  Elizabeth Krause 23 y o  2000  Date of Treatment Plan: February 5, 2021  Diagnosis/Diagnoses:    1  Insomnia, unspecified type      Strengths/Personal Resources for Self-Care: ability to communicate needs, ability to understand psychiatric illness, average or above intelligence, general fund of knowledge, good understanding of illness, independence, ability to negotiate basic needs, work skills  Area/Areas of need (in own words): anxiety, mood instability, ADHD symptoms  1  Long Term Goal: Feel more positive about the future  Target Date:4 months - 6/5/2021  Person/Persons responsible for completion of goal: Doris  2  Short Term Objective (s) - How will we reach this goal?:   A  Provider new recommended medication/dosage changes and/or continue medication(s): continue current medications as prescribed  B  Exercise regularly   C  Take psychiatric medications responsibly  Target Date:6 weeks - 3/19/2021  Person/Persons Responsible for Completion of Goal: [facility]  Progress Towards Goals: continuing treatment  Treatment Modality: medication management every 8 weeks, medication education at every visit  Review due 180 days from date of this plan: 4 months - 6/5/2021  Expected length of service: maintenance  My Physician/PA/NP and I have developed this plan together and I agree to work on the goals and objectives  I understand the treatment goals that were developed for my treatment  Treatment Plan done but not signed at time of office visit due to:  Plan reviewed by virtual visit and verbal consent given due to [address] distancing
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
## 2021-02-11 ENCOUNTER — TELEMEDICINE (OUTPATIENT)
Dept: DERMATOLOGY | Age: 21
End: 2021-02-11
Payer: COMMERCIAL

## 2021-02-11 DIAGNOSIS — Z79.899 HIGH RISK MEDICATION USE: ICD-10-CM

## 2021-02-11 DIAGNOSIS — L70.0 ACNE VULGARIS: Primary | ICD-10-CM

## 2021-02-11 DIAGNOSIS — Z51.81 MEDICATION MONITORING ENCOUNTER: ICD-10-CM

## 2021-02-11 PROCEDURE — 99213 OFFICE O/P EST LOW 20 MIN: CPT | Performed by: DERMATOLOGY

## 2021-02-11 NOTE — PROGRESS NOTES
Virtual Regular Visit      Assessment/Plan:    Problem List Items Addressed This Visit     None      Visit Diagnoses     Acne vulgaris    -  Primary    High risk medication use        Medication monitoring encounter                   Reason for visit is   Chief Complaint   Patient presents with    Virtual Regular Visit        Encounter provider Julia Pressley MD    Provider located at [place] [id number] [phone]      Recent Visits  No visits were found meeting these conditions  Showing recent visits within past 7 days and meeting all other requirements     Today's Visits  Date Type Provider Dept   02/11/21 Telemedicine Kash De Jesus MD  [facility] [place]   Showing today's visits and meeting all other requirements     Future Appointments  No visits were found meeting these conditions  Showing future appointments within next 150 days and meeting all other requirements        The patient was identified by name and date of birth  Slade Hurst was informed that this is a telemedicine visit and that the visit is being conducted through [facility] and patient was informed that this is a secure, HIPAA-compliant platform  She agrees to proceed     My office door was closed  The patient was notified the following individuals were present in the room Riky  She acknowledged consent and understanding of privacy and security of the video platform  The patient has agreed to participate and understands they can discontinue the visit at any time  Patient is aware this is a billable service  Subjective  Slade Hurst is a 23 y o  female virtually present for acne follow up        HPI     Past Medical History:   Diagnosis Date    ADHD (attention deficit hyperactivity disorder)     Borderline personality disorder ([facility] [address] )     Depression     Fracture of carpal bones     Hypothyroidism     Hypothyroidism (acquired)     PTSD (post-traumatic stress disorder)     Suicide and self-inflicted injury ([facility] [address] ) [phone]    Suicide attempt [facility])        Past Surgical History:   Procedure Laterality Date    WISDOM TOOTH EXTRACTION         Current Outpatient Medications   Medication Sig Dispense Refill    Adapalene-Benzoyl Peroxide 0 3-2 5 % GEL Apply 1 application topically daily at bedtime 45 g 3    amphetamine-dextroamphetamine (ADDERALL XR) 10 MG 24 hr capsule Take 1 capsule (10 mg total) by mouth every morningMax Daily Amount: 10 mg 30 capsule 0    ARIPiprazole (ABILIFY) 2 mg tablet Take 1 tablet (2 mg total) by mouth daily 90 tablet 0    clindamycin (CLEOCIN T) 1 % external solution Apply topically 2 (two) times a day 30 mL 2    fluticasone (FLONASE) 50 mcg/act nasal spray 1 spray into each nostril daily 1 Bottle 2    levonorgestrel (MIRENA) 20 MCG/24HR IUD 1 each by Intrauterine route once      levothyroxine 50 mcg tablet Take 1 tablet (50 mcg total) by mouth daily 90 tablet 1    mirtazapine (REMERON) 7 5 MG tablet Take 1 tablet (7 5 mg total) by mouth daily at bedtime 90 tablet 0    Rexulti 1 MG tablet TAKE 1 TABLET BY MOUTH EVERY DAY 30 tablet 2    spironolactone (ALDACTONE) 25 mg tablet TAKE 1 TABLET BY MOUTH EVERY DAY 30 tablet 8     No current facility-administered medications for this visit  No Known Allergies    Review of Systems    Video Exam    There were no vitals filed for this visit  Physical Exam     I spent 10 minutes directly with the patient during this visit      VIRTUAL VISIT DISCLAIMER    Ernie Loo acknowledges that she has consented to an online visit or consultation  She understands that the online visit is based solely on information provided by her, and that, in the absence of a face-to-face physical evaluation by the physician, the diagnosis she receives is both limited and provisional in terms of accuracy and completeness   This is not intended to replace a full medical face-to-face evaluation by the physician  Aurora Gaspar understands and accepts these terms  ISOTRETINOIN "ACCUTANE": FEMALE    Age: 23 y o  Weight (in KILOgrams): 63 5 kg  Pulse: unable to obtain  Blood Pressure: unable to obtain    Ipledge number: [id number]  Last 4 digits SS: [id number]    Contraception Type 1: implantable hormone  Contraception Type 2: male latex condom  Patient reminded that this must be listed in same order on iPledge  Yes       "Goal Dose" in mg (125 mg x weight in kg): 7937 5 mg    Interim month   "Daily Dose" of Isotretinoin the patient has actually been taking since last visit (this is usually what was prescribed): 0 mg   "Total # of Days" patient took Isotretinoin since last visit (this is usually 30):  0 days   "Total Monthly Dose" in mg since last visit (this equals "Daily Dose" x "Total # of Days"): 0 mg    Cumulative dosage   "Total cumulative Dose" in mg (add the above "Total Monthly Dose" with "Total Cumulative Dose" from last visit: 0 mg          Physical Exam   Psychiatric/Mood: normal   Anatomic Location Affected:  Face, back   Morphological Description:  o Open/Closed Comedones:  - Several ("Moderate")  o Inflammatory Papules/Pustules:  - Several ("Moderate")  o Nodules:  - Several ("Moderate")  o Scarring:  - Several ("Moderate")  o Excoriations:  - Several ("Moderate")  o Local Skin Redness/Erythema:  - Several ("Moderate")  o Local Skin Dryness/Scaling:  - Several ("Moderate")  o Local Skin Dyspigmentation:  - Several ("Moderate")   Pertinent Positives:   Pertinent Negatives:    Labs   Completed: No   Labs reviewed: pending   Pregnancy test: serum quantitative  - Result: pending    Additional History of Present Condition:  Patient plans on getting labs completed  Once completed, may start medication      DIAGNOSES:     Acne Vulgaris   High Risk Medication   Medication Monitoring   Xerosis (see below for patient education)    Assessment and Plan:   Target Total Dose per KILOgram:  125 mg/KILOgram (this may change this on a patient-by-patient basis)   Planned daily dose for next 30 days: 40 mg   (please remember to add patient's "Ipledge number" to actual prescription):  [id number]   Return to clinic in about 1 month, please review ipledge guidelines in terms of timing (see below for patient education)   Get labs 1-2 days before next appointment: YES   Patient confirmed in iPledge: NO   Patients counseled:  - Cannot donate blood while taking isotretinoin and for 1 month after completing therapy  No  - Do not share medication with anyone  YES  - Possible side effects discussed, including sun sensitivity, dry lips/eyes/skin, headaches, blurry vision (pseudotumor cerebri), muscle/joint aches, GI upset, bloody stools (IBD including Crohns/Ulcerative Colitis), jaundice, liver dysfunction, lipid abnormalities, bone marrow dysfunction, mood effects, thoughts of hurting oneself or others, and flaring of acne (acne fulminans/SAPPHO)  YES  - Females cannot get pregnant and must be on two forms of birth control while on therapy and at least one month after  YES  - Report any side effects of mood swings or depression and stop medication upon occurrence  YES  - Patient needs to confirm counseling in iPledge prior to picking up prescription  YES      Isotretinoin for Acne   Isotretinoin is a retinoid medication that is taken by mouth to treat severe nodular acne  Typically, it is used once other acne treatments have not worked, such as oral antibiotics  Usually isotretinoin is taken for 4 to 6 months, although the length of treatment can vary from person to person  While most patients acne improves and may even clear with this medication, in 20% of patients acne can come back  This requires additional acne treatment or even a second cycle of isotretinoin  How should I take isotretinoin?  Isotretinoin dosing is weight-based and should be taken exactly as prescribed       If you miss a dose, skip that dose  Do not take 2 doses at the same time   Take with food to help with absorption   All instructions in the iPLEDGE program packet (www [employer]) that was provided must be followed (see below for highlights)   You will get a 30 day supply of isotretinoin at a time  It cannot be automatically refilled  Make certain that you have been given enough medication to last 30 days as pharmacists are unable to refill or make changes within a month   You must return to your dermatologist every month to make sure you are not having any serious side effects from isotretinoin  For female patients, this visit will always include a monthly pregnancy test  Other laboratory studies, including liver function tests, cholesterol and triglycerides, must also be conducted before and during treatment  What should I avoid while taking isotretinoin?  Do not get pregnant from one month prior or 1 month following taking any isotretinoin   Do not donate blood while take isotretinoin or until 1 months after coming off the medication   Do not have cosmetic procedures to smooth your skin, including waxing, dermabrasion, or laser procedures, while taking this medication and for at least 6 months after you stop   Do not share isotretinoin with any other people  It can cause birth defects and other serious health problems   Do not use any other acne medications, including medicated washes, cleansers, creams or antibiotic pills during your treatment with isotretinoin unless expressly directed to by your dermatologist      Initiating isotretinoin & the iPLEDGE Program    The iPLEDGE Program is a strict, government-required program to prevent females from becoming pregnant while on isotretinoin  All females and males must participate  Note: Your provider must follow this program and cannot change any of the requirements      Before starting isotretinoin, your provider will talk to you about the safe use of this medication and you will need to sign consent forms in order to receive treatment   If you fail to keep appointments, you will be unable to get your prescription filled  Helen Hopper For females of childbearing age:      o Shruthi Lockett must be on two specific forms of birth control before starting isotretinoin  Your provider must get 2 negative pregnancy tests, at least 30 days apart, before you can proceed with the medication  The second pregnancy test must be obtained within 5 days of the menstrual cycle  If you choose not to be sexually active during treatment, you still must have the 2 negative pregnancy tests    o You must answer a series of questions either online or by phone every month prior to getting the prescription  o Monthly prescriptions must be filled within 7 days of that month's pregnancy test   It is important that you notify your physician well before the 7th day if there are any unforeseen delays, such as prior authorizations  o For more information, visit: [email or web address]    What are the possible side effects of isotretinoin? What should I do? Most side effects from isotretinoin are mild and can be easily improved with simple remedies  Others are more concerning   Dry skin and eyes, chapped lips and dry nose that may lead to nosebleeds  o Dry Skin: Apply sensitive skin moisturizers to dry skin at least two times a day  You may need sunscreen (SPF 30) in the morning and to reapply when outside  o Dry Eyes: Use saline eye drops or artificial tears  o Dry Nose/Nosebleeds: Use saline nasal spray (ex  Ayr) during the day and at bedtime  To stop nosebleeds, hold pressure  If this does not work, call your dermatologist     o Chapped lips: apply petrolatum-based lip balms routinely  Avoid anything medicated   Contact your dermatologist for excessive dryness, cracks, tenderness or pain      Increased blood fats and cholesterol (usually in patients with a personal or family history of cholesterol or triglyceride problems)   Vision problems affecting your ability to see in the dark and drive at night   Bone, muscle and tendon aches can occur  Additional stretching before and after activities may help relieve aches  If you are otherwise healthy, consider the use of ibuprofen or naproxen  If you are unsure if you can use these pain medications, ask your doctor first  Also, call your doctor if you experience severe back pain, joint pain, or a broken bone    Changes in mood, including anxiety, depressive symptoms or suicidal thoughts which may or may not be temporary  Notify your doctor if your or a family member have suffered from these conditions or if you have any concerns during treatment   Serious birth defects or miscarriage can occur while taking this medication and for one month after taking your last dose of isotretinoin  You must not get pregnant while taking isotretinoin  Once the medication is out of your system, 30 days, there is no effect on the baby   Increased pressure in the brain  Call your doctor right away if you experience bad headache, blurred vision, dizziness, nausea or vomiting, or seizures   Skin rash - call your doctor right away if you develop any rashes or blisters on the skin   Liver damage - call your doctor right away if you experience severe stomach, chest or bowel pain, painful swallowing, diarrhea, blood in your stool, yellowing of your skin or eyes, or dark urine   Gastrointestinal bleeding  If you experience unusual abdominal pain or red or black/tarry stools, call your doctor immediately  You should also notify your doctor if you or a family member has a history of ulcerative colitis or Crohns disease   Worsening acne  Mild worsening of acne can occur in the first few weeks of using isotreinoin  If your acne is getting significantly worse, call your doctor   This may require temporarily stopping the isotretinoin and possibly adding other medications  XEROSIS ("DRY SKIN")    Dry skin refers to skin that feels dry to touch  Dry skin has a dull surface with a rough, scaly quality  The skin is less pliable and cracked  When dryness is severe, the skin may become inflamed and fissured  Although any body site can be dry, dry skin tends to affect the shins more than any other site  Dry skin is lacking moisture in the outer horny cell layer (stratum corneum) and this results in cracks in the skin surface  Dry skin is also called xerosis, xeroderma or asteatosis (lack of fat)  It can affect males and females of all ages  There is some racial variability in water and lipid content of the skin   Dry skin that starts in early childhood may be one of about 20 types of ichthyosis (fish-scale skin)  There is often a family history of dry skin   Dry skin is commonly seen in people with atopic dermatitis   Nearly everyone > 60 years has dry skin  Dry skin that begins later may be seen in people with certain diseases and conditions   Postmenopausal women   Hypothyroidism   Chronic renal disease    Malnutrition and weight loss    Subclinical dermatitis    Treatment with certain drugs such as oral retinoids, diuretics and epidermal growth factor receptor inhibitors    People exposed to a dry environment may experience dry skin   Low humidity: in desert climates or cool, windy conditions    Excessive air conditioning    Direct heat from a fire or fan heater    Excessive bathing    Contact with soap, detergents and solvents    Inappropriate topical agents such as alcohol    Frictional irritation from rough clothing or abrasives    Dry skin is due to abnormalities in the integrity of the barrier function of the stratum corneum, which is made up of corneocytes   There is an overall reduction in the lipids in the stratum corneum      Ratio of ceramides, cholesterol and free fatty acids may be normal or altered   There may be a reduction in the proliferation of keratinocytes   Keratinocyte subtypes change in dry skin with a decrease in keratins K1, K10 and increase in K5, K14     Involucrin (a protein) may be expressed early, increasing cell stiffness   The result is retention of corneocytes and reduced water-holding capacity  What is the treatment for dry skin? The mainstay of treatment of dry skin and ichthyosis is moisturisers/emollients  They should be applied liberally and often enough to:   Reduce itch    Improve the barrier function    Prevent entry of irritants, bacteria    Reduce transepidermal water loss  When considering which emollient is most suitable, consider:   Severity of the dryness    Tolerance    Personal preference    Cost and availability  Emollients generally work best if applied to damp skin, if pH is below 7 (acidic), and if containing humectants such as urea or propylene glycol  Additional treatments include:   Topical steroid if itchy or there is dermatitis -- choose an emollient base    Topical calcineurin inhibitors if topical steroids are unsuitable  How can dry skin be prevented? Eliminate aggravating factors:   Reduce the frequency of bathing   A humidifier in winter and air conditioner in summer    Compare having a short shower with a prolonged soak in a bath   Use lukewarm, not hot, water   Replace standard soap with a substitute such as a synthetic detergent cleanser, water-miscible emollient, bath oil, anti-pruritic tar oil, colloidal oatmeal etc     Apply an emollient liberally and often, particularly shortly after bathing, and when itchy  The drier the skin, the thicker this should be, especially on the hands  What is the outlook for dry skin? A tendency to dry skin may persist life-long, or it may improve once contributing factors are controlled      Scribe Attestation    I,:  Poli Light am acting as a scribe while in the presence of the attending physician :       I,:  Chelsey De La Torre MD personally performed the services described in this documentation    as scribed in my presence :

## 2021-02-12 ENCOUNTER — LAB (OUTPATIENT)
Dept: LAB | Facility: CLINIC | Age: 21
End: 2021-02-12
Payer: COMMERCIAL

## 2021-02-12 LAB
ALBUMIN SERPL BCP-MCNC: 3.8 G/DL (ref 3.5–5)
ALP SERPL-CCNC: 45 U/L (ref 46–116)
ALT SERPL W P-5'-P-CCNC: 21 U/L (ref 12–78)
AST SERPL W P-5'-P-CCNC: 14 U/L (ref 5–45)
BILIRUB DIRECT SERPL-MCNC: 0.18 MG/DL (ref 0–0.2)
BILIRUB SERPL-MCNC: 0.58 MG/DL (ref 0.2–1)
CHOLEST SERPL-MCNC: 129 MG/DL (ref 50–200)
HCG SERPL QL: NEGATIVE
PROT SERPL-MCNC: 6.5 G/DL (ref 6.4–8.2)
TRIGL SERPL-MCNC: 56 MG/DL

## 2021-02-12 PROCEDURE — 84703 CHORIONIC GONADOTROPIN ASSAY: CPT | Performed by: DERMATOLOGY

## 2021-02-12 PROCEDURE — 84478 ASSAY OF TRIGLYCERIDES: CPT | Performed by: DERMATOLOGY

## 2021-02-12 PROCEDURE — 82465 ASSAY BLD/SERUM CHOLESTEROL: CPT | Performed by: DERMATOLOGY

## 2021-02-12 PROCEDURE — 80076 HEPATIC FUNCTION PANEL: CPT | Performed by: DERMATOLOGY

## 2021-02-12 PROCEDURE — 36415 COLL VENOUS BLD VENIPUNCTURE: CPT | Performed by: DERMATOLOGY

## 2021-02-15 ENCOUNTER — TELEPHONE (OUTPATIENT)
Dept: DERMATOLOGY | Facility: CLINIC | Age: 21
End: 2021-02-15

## 2021-02-15 DIAGNOSIS — L70.0 ACNE VULGARIS: Primary | ICD-10-CM

## 2021-02-15 RX ORDER — ISOTRETINOIN 20 MG/1
20 CAPSULE ORAL 2 TIMES DAILY
Qty: 60 CAPSULE | Refills: 0 | Status: SHIPPED | OUTPATIENT
Start: 2021-02-15 | End: 2021-03-01 | Stop reason: SDUPTHER

## 2021-02-15 NOTE — TELEPHONE ENCOUNTER
Left message on behalf of Dr Niurka Cisneros  She received results from the labs, they are all good   The script of accutane was called in and Dr Niurka Cisneros confirmed her in [place] [address] she answers her questions in [address] she can  her meds  Blake Argueta has 5 days  the prescription  Call office with any questions

## 2021-02-16 NOTE — TELEPHONE ENCOUNTER
Patient called in saying that DR Guerin need to call her insurance company  It is in reference to her Accutane  She asked if it can be done today  She was told a message will be sent to the doctor

## 2021-02-16 NOTE — TELEPHONE ENCOUNTER
Spoke to patient  Patient states that a prior authorization is needed for her accutane prescription  Explained to patient that the PA could take a few days depending on the insurance company's response  IPledge confirmation may need to be redone if insurance approves medication after the 5 day period ends  Prior authorization was started and sent to insurance plan  Waiting on determination

## 2021-02-17 ENCOUNTER — TELEPHONE (OUTPATIENT)
Dept: DERMATOLOGY | Facility: CLINIC | Age: 21
End: 2021-02-17

## 2021-02-19 NOTE — TELEPHONE ENCOUNTER
Received denial for Riaz    Listed potential alternatives  Started PA for myorisan 20 mg    Updated patient on progress

## 2021-02-23 ENCOUNTER — OFFICE VISIT (OUTPATIENT)
Dept: FAMILY MEDICINE CLINIC | Facility: CLINIC | Age: 21
End: 2021-02-23
Payer: COMMERCIAL

## 2021-02-23 VITALS
RESPIRATION RATE: 17 BRPM | BODY MASS INDEX: 25.61 KG/M2 | TEMPERATURE: 99.5 F | DIASTOLIC BLOOD PRESSURE: 62 MMHG | OXYGEN SATURATION: 97 % | SYSTOLIC BLOOD PRESSURE: 110 MMHG | HEIGHT: 64 IN | WEIGHT: 150 LBS | HEART RATE: 89 BPM

## 2021-02-23 DIAGNOSIS — Z00.00 ROUTINE PHYSICAL EXAMINATION: Primary | ICD-10-CM

## 2021-02-23 DIAGNOSIS — F48.1 DEPERSONALIZATION-DEREALIZATION DISORDER (HCC): ICD-10-CM

## 2021-02-23 DIAGNOSIS — J45.20 MILD INTERMITTENT ASTHMA WITHOUT COMPLICATION: ICD-10-CM

## 2021-02-23 DIAGNOSIS — E03.9 PRIMARY HYPOTHYROIDISM: ICD-10-CM

## 2021-02-23 PROCEDURE — 4004F PT TOBACCO SCREEN RCVD TLK: CPT | Performed by: NURSE PRACTITIONER

## 2021-02-23 PROCEDURE — 99395 PREV VISIT EST AGE 18-39: CPT | Performed by: NURSE PRACTITIONER

## 2021-02-23 PROCEDURE — 3008F BODY MASS INDEX DOCD: CPT | Performed by: NURSE PRACTITIONER

## 2021-02-23 RX ORDER — ALBUTEROL SULFATE 90 UG/1
2 AEROSOL, METERED RESPIRATORY (INHALATION) EVERY 6 HOURS PRN
Qty: 1 INHALER | Refills: 5 | Status: SHIPPED | OUTPATIENT
Start: 2021-02-23 | End: 2021-08-30

## 2021-02-23 NOTE — PROGRESS NOTES
[facility] PRACTICE    NAME: Kehinde Lynch  AGE: 23 y o  SEX: female  : 2000     DATE: 2021      Assessment and Plan:     Problem List Items Addressed This Visit        Endocrine    Primary hypothyroidism     Repeat TSH with reflex to free T4 ordered for next month   Continue Levothyroxine 50 mcg daily, pt declined needing a refill on this today          Relevant Orders    TSH, 3rd generation with Free T4 reflex       Respiratory    Asthma     Stable, Albuterol rescue inhaler refilled today   Advised to stop smoking marijuana/ vaping          Relevant Medications    albuterol (PROVENTIL HFA,VENTOLIN HFA) 90 mcg/act inhaler       Other    Depersonalization-derealization disorder ([address] )     Managed by Psychiatry   Continue current medication regimen per Psych            Other Visit Diagnoses     Routine physical examination    -  Primary        PE: PAP is UTD and is managed by GYN  Declined the flu and Tdap vaccines today     Immunizations and preventive care screenings were discussed with patient today  Appropriate education was printed on patient's after visit summary  Counseling:  Alcohol/drug use: discussed moderation in alcohol intake, the recommendations for healthy alcohol use, and avoidance of illicit drug use  Dental Health: discussed importance of regular tooth brushing, flossing, and dental visits  Injury prevention: discussed safety/seat belts, safety helmets, smoke detectors, carbon dioxide detectors, and smoking near bedding or upholstery  Sexual health: discussed sexually transmitted diseases, partner selection, use of condoms, avoidance of unintended pregnancy, and contraceptive alternatives  · Exercise: the importance of regular exercise/physical activity was discussed  Recommend exercise 3-5 times per week for at least 30 minutes  BMI Counseling: Body mass index is 25 75 kg/m²   The BMI is above normal  Nutrition recommendations include encouraging healthy choices of fruits and vegetables, moderation in carbohydrate intake and increasing intake of lean protein  Exercise recommendations include moderate physical activity 150 minutes/week  Tobacco Cessation Counseling: Tobacco cessation counseling was provided  The patient is sincerely urged to quit consumption of tobacco  She is not ready to quit tobacco  Medication options and side effects of medication discussed  Patient refused medication  No follow-ups on file  Chief Complaint:     Chief Complaint   Patient presents with    Annual Exam      History of Present Illness:     Adult Annual Physical   Patient here for a comprehensive physical exam  The patient reports no problems  Hypothyroidism- taking Levothyroxine 50 mcg daily  TSH at 1 620 from 2020    Mild-intermittent asthma- requesting a refill on her Albuterol inhaler as she lost her current one  Denies any recent exacerbations but likes to have the inhaler on hand  Denies SOB, wheezing or coughing  She does smoke marijuana approximately 3 x per week and vapes daily     Follows with Dermatology- just started on Accutane     Also follows with Psychiatry for PTSD, depression, depersonalization-derealization disorder   Diet and Physical Activity  · Diet/Nutrition: well balanced diet  · Exercise: moderate cardiovascular exercise        Depression Screening  PHQ-9 Depression Screening    PHQ-9:   Frequency of the following problems over the past two weeks:      Little interest or pleasure in doing things: 0 - not at all  Feeling down, depressed, or hopeless: 0 - not at all  Trouble falling or staying asleep, or sleeping too much: 0 - not at all  Feeling tired or having little energy: 0 - not at all  Poor appetite or overeatin - not at all  Feeling bad about yourself - or that you are a failure or have let yourself or your family down: 0 - not at all  Trouble concentrating on things, such as reading the newspaper or watching television: 0 - not at all  Moving or speaking so slowly that other people could have noticed  Or the opposite - being so fidgety or restless that you have been moving around a lot more than usual: 0 - not at all  Thoughts that you would be better off dead, or of hurting yourself in some way: 0 - not at all  PHQ-2 Score: 0  PHQ-9 Score: 0       General Health  · Sleep: sleeps well  · Hearing: normal - bilateral   · Vision: no vision problems  · Dental: regular dental visits  /GYN Health  · Contraceptive method: IUD placement  · History of STDs?: no   · PAP from 3/2020, follows with GYN     Review of Systems:     Review of Systems   Constitutional: Negative for chills and fever  HENT: Negative for ear pain and sore throat  Eyes: Negative for pain and visual disturbance  Respiratory: Negative for cough and shortness of breath  Cardiovascular: Negative for chest pain and palpitations  Gastrointestinal: Negative for abdominal pain and vomiting  Genitourinary: Negative for dysuria and hematuria  Musculoskeletal: Negative for arthralgias and back pain  Skin: Negative for color change and rash  Neurological: Negative for seizures and syncope  Hematological: Negative for adenopathy  Does not bruise/bleed easily  Psychiatric/Behavioral: Positive for decreased concentration and dysphoric mood  Negative for self-injury and suicidal ideas  The patient is nervous/anxious  All other systems reviewed and are negative       Past Medical History:     Past Medical History:   Diagnosis Date    ADHD (attention deficit hyperactivity disorder)     Borderline personality disorder ([facility] [address] )     Depression     Fracture of carpal bones     Hypothyroidism     Hypothyroidism (acquired)     PTSD (post-traumatic stress disorder)     Suicide and self-inflicted injury ([facility] [address] ) 83/8100    Suicide attempt [facility])       Past Surgical History:     Past Surgical History: Procedure Laterality Date    WISDOM TOOTH EXTRACTION        Social History:        Social History     Socioeconomic History    Marital status: Single     Spouse name: None    Number of children: None    Years of education: None    Highest education level: None   Occupational History    None   Social Needs    Financial resource strain: Not very hard    Food insecurity     Worry: Never true     Inability: Never true    Transportation needs     Medical: No     Non-medical: No   Tobacco Use    Smoking status: Current Every Day Smoker    Smokeless tobacco: Current User    Tobacco comment: vaping   Substance and Sexual Activity    Alcohol use: No    Drug use: Yes     Types: Marijuana     Comment: Rarely    Sexual activity: Yes     Partners: Male     Birth control/protection: Condom Male, I U D  Lifestyle    Physical activity     Days per week: 2 days     Minutes per session: 30 min    Stress: Very much   Relationships    Social connections     Talks on phone: Patient refused     Gets together: Patient refused     Attends Baptism service: Patient refused     Active member of club or organization: Patient refused     Attends meetings of clubs or organizations: Patient refused     Relationship status: Patient refused    Intimate partner violence     Fear of current or ex partner: No     Emotionally abused: No     Physically abused: No     Forced sexual activity: No   Other Topics Concern    None   Social History Narrative    Non- smoker     No known smoke exposure     caffeine use - 2 cups per day     Alcohol Socially     Seatbelt use     Single     [facility]  [facility]      Lives with boyfriend     Number of siblings: 4    Relationship with siblings: good      Family History:     Family History   Problem Relation Age of Onset    Hypertension Family     Thyroid disease Family     Cancer Family     Heart disease Family     Diabetes Family     Hypertension Father     ADD / ADHD Father    Baldemar Lauren ADD / ADHD Mother     Bipolar disorder Mother     ADD / ADHD Sister     ADD / ADHD Brother     Bipolar disorder Paternal Uncle     Uterine cancer Maternal Grandmother     Cervical cancer Maternal Grandmother     Endometrial cancer Maternal Grandmother     ADD / ADHD Brother     Breast cancer Paternal Aunt     Skin cancer Paternal Uncle       Current Medications:     Current Outpatient Medications   Medication Sig Dispense Refill    Adapalene-Benzoyl Peroxide 0 3-2 5 % GEL Apply 1 application topically daily at bedtime 45 g 3    ARIPiprazole (ABILIFY) 2 mg tablet Take 1 tablet (2 mg total) by mouth daily 90 tablet 0    clindamycin (CLEOCIN T) 1 % external solution Apply topically 2 (two) times a day 30 mL 2    fluticasone (FLONASE) 50 mcg/act nasal spray 1 spray into each nostril daily 1 Bottle 2    ISOtretinoin (ACCUTANE) 20 MG capsule Take 1 capsule (20 mg total) by mouth 2 (two) times a day 60 capsule 0    levonorgestrel (MIRENA) 20 MCG/24HR IUD 1 each by Intrauterine route once      levothyroxine 50 mcg tablet Take 1 tablet (50 mcg total) by mouth daily 90 tablet 1    mirtazapine (REMERON) 7 5 MG tablet Take 1 tablet (7 5 mg total) by mouth daily at bedtime 90 tablet 0    Rexulti 1 MG tablet TAKE 1 TABLET BY MOUTH EVERY DAY 30 tablet 2    spironolactone (ALDACTONE) 25 mg tablet TAKE 1 TABLET BY MOUTH EVERY DAY 30 tablet 8    albuterol (PROVENTIL HFA,VENTOLIN HFA) 90 mcg/act inhaler Inhale 2 puffs every 6 (six) hours as needed for wheezing or shortness of breath 1 Inhaler 5    amphetamine-dextroamphetamine (ADDERALL XR) 10 MG 24 hr capsule Take 1 capsule (10 mg total) by mouth every morningMax Daily Amount: 10 mg 30 capsule 0     No current facility-administered medications for this visit         Allergies:     No Known Allergies   Physical Exam:     /62 (BP Location: Left arm, Patient Position: Sitting, Cuff Size: Adult)   Pulse 89   Temp 99 5 °F (37 5 °C) (Tympanic) Resp 17   Ht 5' 4" (1 626 m)   Wt 68 kg (150 lb)   SpO2 97%   BMI 25 75 kg/m²     Physical Exam  Vitals signs and nursing note reviewed  Constitutional:       General: She is not in acute distress  Appearance: Normal appearance  She is well-developed  She is not ill-appearing, toxic-appearing or diaphoretic  HENT:      Head: Normocephalic and atraumatic  Eyes:      Extraocular Movements: Extraocular movements intact  Conjunctiva/sclera: Conjunctivae normal       Pupils: Pupils are equal, round, and reactive to light  Neck:      Musculoskeletal: Normal range of motion and neck supple  No neck rigidity or muscular tenderness  Thyroid: No thyromegaly  Vascular: No carotid bruit  Cardiovascular:      Rate and Rhythm: Normal rate and regular rhythm  Pulses: Normal pulses  Heart sounds: Normal heart sounds  No murmur  Pulmonary:      Effort: Pulmonary effort is normal  No respiratory distress  Breath sounds: Normal breath sounds  Abdominal:      General: Bowel sounds are normal  There is no distension  Palpations: Abdomen is soft  Tenderness: There is no abdominal tenderness  Musculoskeletal: Normal range of motion  Lymphadenopathy:      Cervical: No cervical adenopathy  Skin:     General: Skin is warm and dry  Neurological:      General: No focal deficit present  Mental Status: She is alert and oriented to person, place, and time  Psychiatric:         Mood and Affect: Mood normal          Behavior: Behavior normal          Thought Content:  Thought content normal          Judgment: Judgment normal           Miiram Green, Magdi [facility]

## 2021-02-23 NOTE — ASSESSMENT & PLAN NOTE
Repeat TSH with reflex to free T4 ordered for next month   Continue Levothyroxine 50 mcg daily, pt declined needing a refill on this today

## 2021-02-24 DIAGNOSIS — E03.9 PRIMARY HYPOTHYROIDISM: ICD-10-CM

## 2021-02-24 RX ORDER — LEVOTHYROXINE SODIUM 0.05 MG/1
TABLET ORAL
Qty: 90 TABLET | Refills: 1 | Status: SHIPPED | OUTPATIENT
Start: 2021-02-24 | End: 2021-09-14 | Stop reason: SDUPTHER

## 2021-02-26 ENCOUNTER — TELEPHONE (OUTPATIENT)
Dept: PSYCHIATRY | Facility: CLINIC | Age: 21
End: 2021-02-26

## 2021-02-26 DIAGNOSIS — F90.2 ADHD (ATTENTION DEFICIT HYPERACTIVITY DISORDER), COMBINED TYPE: ICD-10-CM

## 2021-02-26 RX ORDER — DEXTROAMPHETAMINE SACCHARATE, AMPHETAMINE ASPARTATE MONOHYDRATE, DEXTROAMPHETAMINE SULFATE AND AMPHETAMINE SULFATE 2.5; 2.5; 2.5; 2.5 MG/1; MG/1; MG/1; MG/1
10 CAPSULE, EXTENDED RELEASE ORAL EVERY MORNING
Qty: 30 CAPSULE | Refills: 0 | Status: SHIPPED | OUTPATIENT
Start: 2021-02-26 | End: 2021-04-02 | Stop reason: SDUPTHER

## 2021-03-01 ENCOUNTER — TELEPHONE (OUTPATIENT)
Dept: DERMATOLOGY | Facility: CLINIC | Age: 21
End: 2021-03-01

## 2021-03-01 DIAGNOSIS — L70.0 ACNE VULGARIS: ICD-10-CM

## 2021-03-01 RX ORDER — ISOTRETINOIN 20 MG/1
20 CAPSULE ORAL 2 TIMES DAILY
Qty: 60 CAPSULE | Refills: 0 | Status: SHIPPED | OUTPATIENT
Start: 2021-03-01 | End: 2021-03-10 | Stop reason: SDUPTHER

## 2021-03-01 RX ORDER — ISOTRETINOIN 20 MG/1
20 CAPSULE ORAL 2 TIMES DAILY
Qty: 60 CAPSULE | Refills: 0 | Status: SHIPPED | OUTPATIENT
Start: 2021-03-01 | End: 2021-04-19 | Stop reason: ALTCHOICE

## 2021-03-01 NOTE — TELEPHONE ENCOUNTER
Left message for patient  Prior authorization was approved  Pharmacy has the prescription, however she is outside of the recommended window time  She will have to do an at home pregnancy test and upload a picture of the results on [employer]  Dr Rubin Brantley and her will need to confirm on ipledge and then the prescription will be able to get picked up

## 2021-03-03 NOTE — TELEPHONE ENCOUNTER
For some reason the mychart message did not come through    I will confirm her in [address] and she can  the medication

## 2021-03-03 NOTE — TELEPHONE ENCOUNTER
Pt called asking to speak with Dr Deedee Roach  She did send a message to her via My Chart  She said her test was negative and she needs to retake her iPledge quiz again  She was told a message would be sent to the Dr Lois Bailey her aware

## 2021-03-08 ENCOUNTER — LAB (OUTPATIENT)
Dept: LAB | Facility: CLINIC | Age: 21
End: 2021-03-08
Payer: COMMERCIAL

## 2021-03-08 DIAGNOSIS — Z79.899 HIGH RISK MEDICATION USE: ICD-10-CM

## 2021-03-08 DIAGNOSIS — E03.9 PRIMARY HYPOTHYROIDISM: ICD-10-CM

## 2021-03-08 LAB
ALBUMIN SERPL BCP-MCNC: 4 G/DL (ref 3.5–5)
ALP SERPL-CCNC: 50 U/L (ref 46–116)
ALT SERPL W P-5'-P-CCNC: 25 U/L (ref 12–78)
AST SERPL W P-5'-P-CCNC: 16 U/L (ref 5–45)
BILIRUB DIRECT SERPL-MCNC: 0.19 MG/DL (ref 0–0.2)
BILIRUB SERPL-MCNC: 0.65 MG/DL (ref 0.2–1)
HCG SERPL QL: NEGATIVE
PROT SERPL-MCNC: 7.3 G/DL (ref 6.4–8.2)
TRIGL SERPL-MCNC: 109 MG/DL
TSH SERPL DL<=0.05 MIU/L-ACNC: 1.61 UIU/ML (ref 0.36–3.74)

## 2021-03-08 PROCEDURE — 36415 COLL VENOUS BLD VENIPUNCTURE: CPT

## 2021-03-08 PROCEDURE — 84443 ASSAY THYROID STIM HORMONE: CPT

## 2021-03-08 PROCEDURE — 80076 HEPATIC FUNCTION PANEL: CPT

## 2021-03-08 PROCEDURE — 84703 CHORIONIC GONADOTROPIN ASSAY: CPT

## 2021-03-08 PROCEDURE — 84478 ASSAY OF TRIGLYCERIDES: CPT

## 2021-03-10 DIAGNOSIS — L70.0 ACNE VULGARIS: ICD-10-CM

## 2021-03-10 RX ORDER — ISOTRETINOIN 20 MG/1
20 CAPSULE ORAL 2 TIMES DAILY
Qty: 60 CAPSULE | Refills: 0 | Status: SHIPPED | OUTPATIENT
Start: 2021-03-10 | End: 2021-04-19 | Stop reason: ALTCHOICE

## 2021-03-10 NOTE — PROGRESS NOTES
Negative pregnancy test result received via [employer]  Patient confirmed and accutane reordered    Follow up in 1 month

## 2021-04-02 ENCOUNTER — TELEMEDICINE (OUTPATIENT)
Dept: PSYCHIATRY | Facility: CLINIC | Age: 21
End: 2021-04-02
Payer: COMMERCIAL

## 2021-04-02 DIAGNOSIS — F41.1 GENERALIZED ANXIETY DISORDER: Primary | ICD-10-CM

## 2021-04-02 DIAGNOSIS — F90.2 ADHD (ATTENTION DEFICIT HYPERACTIVITY DISORDER), COMBINED TYPE: ICD-10-CM

## 2021-04-02 DIAGNOSIS — G47.00 INSOMNIA, UNSPECIFIED TYPE: ICD-10-CM

## 2021-04-02 DIAGNOSIS — F43.10 POST TRAUMATIC STRESS DISORDER (PTSD): ICD-10-CM

## 2021-04-02 PROCEDURE — 99214 OFFICE O/P EST MOD 30 MIN: CPT | Performed by: NURSE PRACTITIONER

## 2021-04-02 PROCEDURE — 4004F PT TOBACCO SCREEN RCVD TLK: CPT | Performed by: NURSE PRACTITIONER

## 2021-04-02 RX ORDER — DEXTROAMPHETAMINE SACCHARATE, AMPHETAMINE ASPARTATE MONOHYDRATE, DEXTROAMPHETAMINE SULFATE AND AMPHETAMINE SULFATE 2.5; 2.5; 2.5; 2.5 MG/1; MG/1; MG/1; MG/1
10 CAPSULE, EXTENDED RELEASE ORAL EVERY MORNING
Qty: 30 CAPSULE | Refills: 0 | Status: SHIPPED | OUTPATIENT
Start: 2021-04-02 | End: 2021-05-19 | Stop reason: SDUPTHER

## 2021-04-02 RX ORDER — MIRTAZAPINE 7.5 MG/1
15 TABLET, FILM COATED ORAL
Qty: 180 TABLET | Refills: 0 | Status: SHIPPED | OUTPATIENT
Start: 2021-04-02 | End: 2021-04-05
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
## 2021-04-02 NOTE — BH TREATMENT PLAN
TREATMENT PLAN (Medication Management Only)        [facility]    Name and Date of Birth:  Ladan Valencia 24 y o  2000  Date of Treatment Plan: April 2, 2021  Diagnosis/Diagnoses:    1  ADHD, hyperactive-impulsive type    2  Generalized anxiety disorder    3  Post traumatic stress disorder (PTSD)    4  Insomnia, unspecified type    5  ADHD (attention deficit hyperactivity disorder), combined type      Strengths/Personal Resources for Self-Care: ability to communicate needs, ability to understand psychiatric illness, average or above intelligence, general fund of knowledge, ability to negotiate basic needs  Area/Areas of need (in own words): anxiety symptoms, depressive symptoms, mood swings, ADHD symptoms  1  Long Term Goal: Feel happier  Target Date:4 months - 8/2/2021  Person/Persons responsible for completion of goal: Doris  2  Short Term Objective (s) - How will we reach this goal?:   A  Provider new recommended medication/dosage changes and/or continue medication(s): continue current medications as prescribed  B  Exercise regularly   C  Avoid drugs   Target Date:6 weeks - 5/14/2021  Person/Persons Responsible for Completion of Goal: Radha Washington  Progress Towards Goals: continuing treatment  Treatment Modality: medication management every 8 weeks, continue psychotherapy with own therapist, medication education at every visit  Review due 180 days from date of this plan: 4 months - 8/2/2021  Expected length of service: ongoing treatment  My Physician/PA/NP and I have developed this plan together and I agree to work on the goals and objectives  I understand the treatment goals that were developed for my treatment  Treatment Plan done but not signed at time of office visit due to:  Plan reviewed by virtual visit and verbal consent given due to [address] distancing

## 2021-04-05 DIAGNOSIS — F32.A DEPRESSION, UNSPECIFIED DEPRESSION TYPE: Primary | ICD-10-CM

## 2021-04-05 RX ORDER — MIRTAZAPINE 15 MG/1
15 TABLET, FILM COATED ORAL
Qty: 90 TABLET | Refills: 0 | Status: SHIPPED | OUTPATIENT
Start: 2021-04-05 | End: 2021-06-18

## 2021-04-05 RX ORDER — MIRTAZAPINE 7.5 MG/1
15 TABLET, FILM COATED ORAL
Qty: 180 TABLET | Refills: 0 | OUTPATIENT
Start: 2021-04-05 | End: 2021-07-04

## 2021-04-08 DIAGNOSIS — Z23 ENCOUNTER FOR IMMUNIZATION: ICD-10-CM

## 2021-04-10 DIAGNOSIS — F41.9 ANXIETY: ICD-10-CM

## 2021-04-11 RX ORDER — PROPRANOLOL HYDROCHLORIDE 10 MG/1
TABLET ORAL
Qty: 270 TABLET | Refills: 0 | OUTPATIENT
Start: 2021-04-11

## 2021-04-14 ENCOUNTER — TELEMEDICINE (OUTPATIENT)
Dept: DERMATOLOGY | Age: 21
End: 2021-04-14
Payer: COMMERCIAL

## 2021-04-14 DIAGNOSIS — L70.0 ACNE VULGARIS: Primary | ICD-10-CM

## 2021-04-14 DIAGNOSIS — L85.3 XEROSIS OF SKIN: ICD-10-CM

## 2021-04-14 DIAGNOSIS — Z79.899 HIGH RISK MEDICATION USE: ICD-10-CM

## 2021-04-14 DIAGNOSIS — L70.0 CYSTIC ACNE: ICD-10-CM

## 2021-04-14 DIAGNOSIS — Z51.81 MEDICATION MONITORING ENCOUNTER: ICD-10-CM

## 2021-04-14 PROCEDURE — 99214 OFFICE O/P EST MOD 30 MIN: CPT | Performed by: DERMATOLOGY

## 2021-04-14 RX ORDER — ISOTRETINOIN 30 MG/1
30 CAPSULE ORAL 2 TIMES DAILY
Qty: 60 CAPSULE | Refills: 0 | Status: SHIPPED | OUTPATIENT
Start: 2021-04-14 | End: 2021-04-19 | Stop reason: SDUPTHER

## 2021-04-14 NOTE — PROGRESS NOTES
Virtual Regular Visit      Assessment/Plan:    Problem List Items Addressed This Visit     None               Reason for visit is   Chief Complaint   Patient presents with    Virtual Regular Visit        Encounter provider Suleiman Bay MD    Provider located at [place] [id number] [phone]      Recent Visits  No visits were found meeting these conditions  Showing recent visits within past 7 days and meeting all other requirements     Future Appointments  No visits were found meeting these conditions  Showing future appointments within next 150 days and meeting all other requirements        The patient was identified by name and date of birth  Mando Jane was informed that this is a telemedicine visit and that the visit is being conducted through [facility] and patient was informed that this is a secure, HIPAA-compliant platform  She agrees to proceed     My office door was closed  The patient was notified the following individuals were present in the room Riky  She acknowledged consent and understanding of privacy and security of the video platform  The patient has agreed to participate and understands they can discontinue the visit at any time  Patient is aware this is a billable service  Subjective  Mando Jane is a 24 y o  female virtually present for acne follow up        HPI     Past Medical History:   Diagnosis Date    ADHD (attention deficit hyperactivity disorder)     Borderline personality disorder ([facility] [address] )     Depression     Fracture of carpal bones     Hypothyroidism     Hypothyroidism (acquired)     PTSD (post-traumatic stress disorder)     Suicide and self-inflicted injury ([facility] [address] ) 21/6159    Suicide attempt [facility])        Past Surgical History:   Procedure Laterality Date    WISDOM TOOTH EXTRACTION         Current Outpatient Medications   Medication Sig Dispense Refill    albuterol (PROVENTIL HFA,VENTOLIN HFA) 90 mcg/act inhaler Inhale 2 puffs every 6 (six) hours as needed for wheezing or shortness of breath 1 Inhaler 5    amphetamine-dextroamphetamine (ADDERALL XR) 10 MG 24 hr capsule Take 1 capsule (10 mg total) by mouth every morningMax Daily Amount: 10 mg 30 capsule 0    ARIPiprazole (ABILIFY) 2 mg tablet Take 1 tablet (2 mg total) by mouth daily 90 tablet 0    clindamycin (CLEOCIN T) 1 % external solution Apply topically 2 (two) times a day 30 mL 2    fluticasone (FLONASE) 50 mcg/act nasal spray 1 spray into each nostril daily 1 Bottle 2    ISOtretinoin (ACCUTANE) 20 MG capsule Take 1 capsule (20 mg total) by mouth 2 (two) times a day 60 capsule 0    levonorgestrel (MIRENA) 20 MCG/24HR IUD 1 each by Intrauterine route once      levothyroxine 50 mcg tablet TAKE 1 TABLET BY MOUTH EVERY DAY 90 tablet 1    mirtazapine (REMERON) 15 mg tablet Take 1 tablet (15 mg total) by mouth daily at bedtime 90 tablet 0    Rexulti 1 MG tablet TAKE 1 TABLET BY MOUTH EVERY DAY 30 tablet 2    spironolactone (ALDACTONE) 25 mg tablet TAKE 1 TABLET BY MOUTH EVERY DAY 30 tablet 8    ISOtretinoin (ACCUTANE) 20 MG capsule Take 1 capsule (20 mg total) by mouth 2 (two) times a day 60 capsule 0     No current facility-administered medications for this visit  No Known Allergies    Review of Systems    Video Exam    Vitals:       Physical Exam     I spent 10 minutes directly with the patient during this visit      VIRTUAL VISIT DISCLAIMER    Myrna Ortiz acknowledges that she has consented to an online visit or consultation  She understands that the online visit is based solely on information provided by her, and that, in the absence of a face-to-face physical evaluation by the physician, the diagnosis she receives is both limited and provisional in terms of accuracy and completeness  This is not intended to replace a full medical face-to-face evaluation by the physician   Myrna Ortiz understands and accepts these terms  [facility] Follow Up Note    Patient Name: Rajat Dumont  Encounter Date: 4/14/2021    Today's Chief Concerns:  Alexa Albert Concern #1: Acne follow up      Current Medications:    Current Outpatient Medications:     albuterol (PROVENTIL HFA,VENTOLIN HFA) 90 mcg/act inhaler, Inhale 2 puffs every 6 (six) hours as needed for wheezing or shortness of breath, Disp: 1 Inhaler, Rfl: 5    amphetamine-dextroamphetamine (ADDERALL XR) 10 MG 24 hr capsule, Take 1 capsule (10 mg total) by mouth every morningMax Daily Amount: 10 mg, Disp: 30 capsule, Rfl: 0    ARIPiprazole (ABILIFY) 2 mg tablet, Take 1 tablet (2 mg total) by mouth daily, Disp: 90 tablet, Rfl: 0    clindamycin (CLEOCIN T) 1 % external solution, Apply topically 2 (two) times a day, Disp: 30 mL, Rfl: 2    fluticasone (FLONASE) 50 mcg/act nasal spray, 1 spray into each nostril daily, Disp: 1 Bottle, Rfl: 2    ISOtretinoin (ACCUTANE) 20 MG capsule, Take 1 capsule (20 mg total) by mouth 2 (two) times a day, Disp: 60 capsule, Rfl: 0    levonorgestrel (MIRENA) 20 MCG/24HR IUD, 1 each by Intrauterine route once, Disp: , Rfl:     levothyroxine 50 mcg tablet, TAKE 1 TABLET BY MOUTH EVERY DAY, Disp: 90 tablet, Rfl: 1    mirtazapine (REMERON) 15 mg tablet, Take 1 tablet (15 mg total) by mouth daily at bedtime, Disp: 90 tablet, Rfl: 0    Rexulti 1 MG tablet, TAKE 1 TABLET BY MOUTH EVERY DAY, Disp: 30 tablet, Rfl: 2    spironolactone (ALDACTONE) 25 mg tablet, TAKE 1 TABLET BY MOUTH EVERY DAY, Disp: 30 tablet, Rfl: 8    ISOtretinoin (ACCUTANE) 20 MG capsule, Take 1 capsule (20 mg total) by mouth 2 (two) times a day, Disp: 60 capsule, Rfl: 0    CONSTITUTIONAL:   Vitals:       Specific Alerts:    Have you been seen by a [facility] in the last 3 years? YES    Are you pregnant or planning to become pregnant? No    Are you currently or planning to be nursing or breast feeding?  No    No Known Allergies    May we call your Preferred Phone number to discuss your specific medical information? YES    May we leave a detailed message that includes your specific medical information? YES    Have you traveled outside of the [facility] in the past 3 months? No    Do you currently have a pacemaker or defibrillator? No    Do you have any artificial heart valves, joints, plates, screws, rods, stents, pins, etc? No    Do you require any medications prior to a surgical procedure? No    Are you taking any medications that cause you to bleed more easily ("blood thinners") No    Have you ever experienced a rapid heartbeat with epinephrine? No    Have you ever been treated with "gold" (gold sodium thiomalate) therapy? [facility] can help with wrinkles, "laugh lines," facial volume loss, "double chin," "love handles," age spots, and more  Are you interested in learning today about some of the skin enhancement procedures that we offer? (If Yes, please provide more detail)     Review of Systems:  Have you recently had or currently have any of the following?     · Fever or chills: No  · Night Sweats: No  · Headaches: No  · Weight Gain: YES  · Weight Loss: No  · Blurry Vision: No  · Nausea: No  · Vomiting: No  · Diarrhea: No  · Blood in Stool: No  · Abdominal Pain: No  · Itchy Skin: No  · Painful Joints: No  · Swollen Joints: No  · Muscle Pain: No  · Irregular Mole: No  · Sun Burn: No  · Dry Skin: YES  · Skin Color Changes: No  · Scar or Keloid: No  · Cold Sores/Fever Blisters: No  · Bacterial Infections/MRSA: No  · Anxiety: YES  · Depression: YES  · Suicidal or Homicidal Thoughts: No    PSYCH: Normal mood and affect  EYES: Normal conjunctiva  ENT: Normal lips and oral mucosa  CARDIOVASCULAR: No edema  RESPIRATORY: Normal respirations  HEME/LYMPH/IMMUNO:  No regional lymphadenopathy except as noted below in ASSESSMENT AND PLAN BY DIAGNOSIS    FULL ORGAN SYSTEM SKIN EXAM (SKIN)   Face Normal except as noted below in Assessment   Back Normal except as noted below in Assessment         ISOTRETINOIN "ACCUTANE": FEMALE    Age: 24 y o  Weight (in KILOgrams): 63 5 kg  Pulse: unable to obtain, virtual  Blood Pressure: unable to obtain, virtual    Ipledge number: [phone]  Last 4 digits SS: [id number]    Contraception Type 1: implantable hormone  Contraception Type 2: male latex condom  Patient reminded that this must be listed in same order on iPledge  Yes       "Goal Dose" in mg (125 mg x weight in kg): 7937 5 mg    Interim month   "Daily Dose" of Isotretinoin the patient has actually been taking since last visit (this is usually what was prescribed): 40 mg (21 days); 20 mg (7 days)   "Total # of Days" patient took Isotretinoin since last visit (this is usually 30):  30 days   "Total Monthly Dose" in mg since last visit (this equals "Daily Dose" x "Total # of Days"): 980 mg    Cumulative dosage   "Total cumulative Dose" in mg (add the above "Total Monthly Dose" with "Total Cumulative Dose" from last visit: 980 mg        Symptoms   Conjunctivitis: No   Night Blindness/ Issues with night vision: No   Focusing Problems: No   Dry Lips/Eyes: YES   Dry Skin: YES   Rash: No   Nose Bleeds: No   Angular cheilitis (cracking in corner of lips):  YES   Headaches: No   Photosensitivity: No   Dry Anus: No   Depression: YES   Mood Changes: No   Suicidal thoughts: No   Fatigue: YES   Weight Loss: No   Muscle Pain/Stiffness: No   Abdominal pain: No   Diarrhea: No   Bloody stools: No    Physical Exam   Psychiatric/Mood: no significant changes   Anatomic Location Affected:  Face, back   Morphological Description:  o Open/Closed Comedones:  - Few ("Mild")  o Inflammatory Papules/Pustules:  - Few ("Mild")  o Nodules:  - Several ("Moderate")  o Scarring:  - Several ("Moderate")  o Excoriations:  - Few ("Mild")  o Local Skin Redness/Erythema:  - Several ("Moderate")  o Local Skin Dryness/Scaling:  - No evidence ("Clear")  o Local Skin Dyspigmentation:  - Few ("Mild")   Pertinent Positives:   Pertinent Negatives:    Labs   Date of last labs: 3/8/2021   Completed: No   Labs reviewed: within normal limits   Pregnancy test: urine pregnancy at home because of COVID 19 PENDING  - Result: PENDING  - Interpretation- pregnant: PENDING      Additional History of Present Condition:  Patient noticing some improvement  Minimal improvement on her back  Smaller pimples on her face  Patient states she has gained about 8 lbs within the 5-6 weeks  Patient has been using a significant amount of chap-stick to help with the dryness of her lips  DIAGNOSES:     Acne Vulgaris   High Risk Medication   Medication Monitoring   Xerosis (see below for patient education)    Assessment and Plan:   Target Total Dose per KILOgram:  125 mg/KILOgram (this may change this on a patient-by-patient basis)   Planned daily dose for next 30 days: 60 mg   (please remember to add patient's "Ipledge number" to actual prescription):  Ipledge number: [phone]   Return to clinic in about 1 month, please review ipledge guidelines in terms of timing (see below for patient education)   Get labs 1-2 days before next appointment: No   Patient confirmed in iPledge: No   Patients counseled:  - Cannot donate blood while taking isotretinoin and for 1 month after completing therapy  No  - Do not share medication with anyone  YES  - Possible side effects discussed, including sun sensitivity, dry lips/eyes/skin, headaches, blurry vision (pseudotumor cerebri), muscle/joint aches, GI upset, bloody stools (IBD including Crohns/Ulcerative Colitis), jaundice, liver dysfunction, lipid abnormalities, bone marrow dysfunction, mood effects, thoughts of hurting oneself or others, and flaring of acne (acne fulminans/SAPPHO)  No  - Females cannot get pregnant and must be on two forms of birth control while on therapy and at least one month after   YES  - Report any side effects of mood swings or depression and stop medication upon occurrence  YES  - Patient needs to confirm counseling in iPledge prior to picking up prescription  YES      Isotretinoin for Acne   Isotretinoin is a retinoid medication that is taken by mouth to treat severe nodular acne  Typically, it is used once other acne treatments have not worked, such as oral antibiotics  Usually isotretinoin is taken for 4 to 6 months, although the length of treatment can vary from person to person  While most patients acne improves and may even clear with this medication, in 20% of patients acne can come back  This requires additional acne treatment or even a second cycle of isotretinoin  How should I take isotretinoin?  Isotretinoin dosing is weight-based and should be taken exactly as prescribed   If you miss a dose, skip that dose  Do not take 2 doses at the same time   Take with food to help with absorption   All instructions in the iPLEDGE program packet (www [employer]) that was provided must be followed (see below for highlights)   You will get a 30 day supply of isotretinoin at a time  It cannot be automatically refilled  Make certain that you have been given enough medication to last 30 days as pharmacists are unable to refill or make changes within a month   You must return to your dermatologist every month to make sure you are not having any serious side effects from isotretinoin  For female patients, this visit will always include a monthly pregnancy test  Other laboratory studies, including liver function tests, cholesterol and triglycerides, must also be conducted before and during treatment  What should I avoid while taking isotretinoin?  Do not get pregnant from one month prior or 1 month following taking any isotretinoin   Do not donate blood while take isotretinoin or until 1 months after coming off the medication      Do not have cosmetic procedures to smooth your skin, including waxing, dermabrasion, or laser procedures, while taking this medication and for at least 6 months after you stop   Do not share isotretinoin with any other people  It can cause birth defects and other serious health problems   Do not use any other acne medications, including medicated washes, cleansers, creams or antibiotic pills during your treatment with isotretinoin unless expressly directed to by your dermatologist      Initiating isotretinoin & the iPLEDGE Program    The iPLEDGE Program is a strict, government-required program to prevent females from becoming pregnant while on isotretinoin  All females and males must participate  Note: Your provider must follow this program and cannot change any of the requirements   Before starting isotretinoin, your provider will talk to you about the safe use of this medication and you will need to sign consent forms in order to receive treatment   If you fail to keep appointments, you will be unable to get your prescription filled  Maryan Nine For females of childbearing age:      o Josefina Eureka must be on two specific forms of birth control before starting isotretinoin  Your provider must get 2 negative pregnancy tests, at least 30 days apart, before you can proceed with the medication  The second pregnancy test must be obtained within 5 days of the menstrual cycle  If you choose not to be sexually active during treatment, you still must have the 2 negative pregnancy tests    o You must answer a series of questions either online or by phone every month prior to getting the prescription  o Monthly prescriptions must be filled within 7 days of that month's pregnancy test   It is important that you notify your physician well before the 7th day if there are any unforeseen delays, such as prior authorizations  o For more information, visit: [email or web address]    What are the possible side effects of isotretinoin? What should I do?    Most side effects from isotretinoin are mild and can be easily improved with simple remedies  Others are more concerning   Dry skin and eyes, chapped lips and dry nose that may lead to nosebleeds  o Dry Skin: Apply sensitive skin moisturizers to dry skin at least two times a day  You may need sunscreen (SPF 30) in the morning and to reapply when outside  o Dry Eyes: Use saline eye drops or artificial tears  o Dry Nose/Nosebleeds: Use saline nasal spray (ex  Ayr) during the day and at bedtime  To stop nosebleeds, hold pressure  If this does not work, call your dermatologist     o Chapped lips: apply petrolatum-based lip balms routinely  Avoid anything medicated   Contact your dermatologist for excessive dryness, cracks, tenderness or pain   Increased blood fats and cholesterol (usually in patients with a personal or family history of cholesterol or triglyceride problems)   Vision problems affecting your ability to see in the dark and drive at night   Bone, muscle and tendon aches can occur  Additional stretching before and after activities may help relieve aches  If you are otherwise healthy, consider the use of ibuprofen or naproxen  If you are unsure if you can use these pain medications, ask your doctor first  Also, call your doctor if you experience severe back pain, joint pain, or a broken bone    Changes in mood, including anxiety, depressive symptoms or suicidal thoughts which may or may not be temporary  Notify your doctor if your or a family member have suffered from these conditions or if you have any concerns during treatment   Serious birth defects or miscarriage can occur while taking this medication and for one month after taking your last dose of isotretinoin  You must not get pregnant while taking isotretinoin  Once the medication is out of your system, 30 days, there is no effect on the baby   Increased pressure in the brain   Call your doctor right away if you experience bad headache, blurred vision, dizziness, nausea or vomiting, or seizures   Skin rash - call your doctor right away if you develop any rashes or blisters on the skin   Liver damage - call your doctor right away if you experience severe stomach, chest or bowel pain, painful swallowing, diarrhea, blood in your stool, yellowing of your skin or eyes, or dark urine   Gastrointestinal bleeding  If you experience unusual abdominal pain or red or black/tarry stools, call your doctor immediately  You should also notify your doctor if you or a family member has a history of ulcerative colitis or Crohns disease   Worsening acne  Mild worsening of acne can occur in the first few weeks of using isotreinoin  If your acne is getting significantly worse, call your doctor  This may require temporarily stopping the isotretinoin and possibly adding other medications  XEROSIS ("DRY SKIN")    Dry skin refers to skin that feels dry to touch  Dry skin has a dull surface with a rough, scaly quality  The skin is less pliable and cracked  When dryness is severe, the skin may become inflamed and fissured  Although any body site can be dry, dry skin tends to affect the shins more than any other site  Dry skin is lacking moisture in the outer horny cell layer (stratum corneum) and this results in cracks in the skin surface  Dry skin is also called xerosis, xeroderma or asteatosis (lack of fat)  It can affect males and females of all ages  There is some racial variability in water and lipid content of the skin   Dry skin that starts in early childhood may be one of about 20 types of ichthyosis (fish-scale skin)  There is often a family history of dry skin   Dry skin is commonly seen in people with atopic dermatitis   Nearly everyone > 60 years has dry skin  Dry skin that begins later may be seen in people with certain diseases and conditions     Postmenopausal women   Hypothyroidism   Chronic renal disease  Malnutrition and weight loss    Subclinical dermatitis    Treatment with certain drugs such as oral retinoids, diuretics and epidermal growth factor receptor inhibitors    People exposed to a dry environment may experience dry skin   Low humidity: in desert climates or cool, windy conditions    Excessive air conditioning    Direct heat from a fire or fan heater    Excessive bathing    Contact with soap, detergents and solvents    Inappropriate topical agents such as alcohol    Frictional irritation from rough clothing or abrasives    Dry skin is due to abnormalities in the integrity of the barrier function of the stratum corneum, which is made up of corneocytes   There is an overall reduction in the lipids in the stratum corneum   Ratio of ceramides, cholesterol and free fatty acids may be normal or altered   There may be a reduction in the proliferation of keratinocytes   Keratinocyte subtypes change in dry skin with a decrease in keratins K1, K10 and increase in K5, K14     Involucrin (a protein) may be expressed early, increasing cell stiffness   The result is retention of corneocytes and reduced water-holding capacity  What is the treatment for dry skin? The mainstay of treatment of dry skin and ichthyosis is moisturisers/emollients  They should be applied liberally and often enough to:   Reduce itch    Improve the barrier function    Prevent entry of irritants, bacteria    Reduce transepidermal water loss  When considering which emollient is most suitable, consider:   Severity of the dryness    Tolerance    Personal preference    Cost and availability  Emollients generally work best if applied to damp skin, if pH is below 7 (acidic), and if containing humectants such as urea or propylene glycol    Additional treatments include:   Topical steroid if itchy or there is dermatitis -- choose an emollient base    Topical calcineurin inhibitors if topical steroids are unsuitable  How can dry skin be prevented? Eliminate aggravating factors:   Reduce the frequency of bathing   A humidifier in winter and air conditioner in summer    Compare having a short shower with a prolonged soak in a bath   Use lukewarm, not hot, water   Replace standard soap with a substitute such as a synthetic detergent cleanser, water-miscible emollient, bath oil, anti-pruritic tar oil, colloidal oatmeal etc     Apply an emollient liberally and often, particularly shortly after bathing, and when itchy  The drier the skin, the thicker this should be, especially on the hands  What is the outlook for dry skin? A tendency to dry skin may persist life-long, or it may improve once contributing factors are controlled

## 2021-04-15 DIAGNOSIS — F29 PSYCHOSIS, UNSPECIFIED PSYCHOSIS TYPE (HCC): ICD-10-CM

## 2021-04-18 ENCOUNTER — IMMUNIZATIONS (OUTPATIENT)
Dept: FAMILY MEDICINE CLINIC | Facility: HOSPITAL | Age: 21
End: 2021-04-18

## 2021-04-18 DIAGNOSIS — Z23 ENCOUNTER FOR IMMUNIZATION: Primary | ICD-10-CM

## 2021-04-18 PROCEDURE — 0001A SARS-COV-2 / COVID-19 MRNA VACCINE (PFIZER-BIONTECH) 30 MCG: CPT

## 2021-04-18 PROCEDURE — 91300 SARS-COV-2 / COVID-19 MRNA VACCINE (PFIZER-BIONTECH) 30 MCG: CPT

## 2021-04-19 DIAGNOSIS — L70.0 ACNE VULGARIS: ICD-10-CM

## 2021-04-19 RX ORDER — ISOTRETINOIN 30 MG/1
30 CAPSULE ORAL 2 TIMES DAILY
Qty: 60 CAPSULE | Refills: 0 | Status: SHIPPED | OUTPATIENT
Start: 2021-04-19 | End: 2021-06-09 | Stop reason: ALTCHOICE

## 2021-04-19 NOTE — PROGRESS NOTES
Urine pregnancy test results received through my chart   Negative, not pregnant, confirmed in [facility]

## 2021-04-26 RX ORDER — BREXPIPRAZOLE 1 MG/1
TABLET ORAL
Qty: 30 TABLET | Refills: 2 | Status: SHIPPED | OUTPATIENT
Start: 2021-04-26 | End: 2021-06-18

## 2021-04-26 RX ORDER — ARIPIPRAZOLE 2 MG/1
TABLET ORAL
Qty: 30 TABLET | Refills: 2 | Status: SHIPPED | OUTPATIENT
Start: 2021-04-26 | End: 2021-06-18

## 2021-04-28 DIAGNOSIS — G47.00 INSOMNIA, UNSPECIFIED TYPE: ICD-10-CM

## 2021-05-03 RX ORDER — MIRTAZAPINE 7.5 MG/1
7.5 TABLET, FILM COATED ORAL
Qty: 30 TABLET | Refills: 2 | OUTPATIENT
Start: 2021-05-03 | End: 2021-08-01

## 2021-05-06 ENCOUNTER — TELEMEDICINE (OUTPATIENT)
Dept: DERMATOLOGY | Age: 21
End: 2021-05-06
Payer: COMMERCIAL

## 2021-05-06 DIAGNOSIS — L70.0 CYSTIC ACNE: Primary | ICD-10-CM

## 2021-05-06 PROCEDURE — 99214 OFFICE O/P EST MOD 30 MIN: CPT | Performed by: DERMATOLOGY

## 2021-05-06 RX ORDER — PREDNISONE 10 MG/1
TABLET ORAL
Qty: 14 TABLET | Refills: 0 | Status: SHIPPED | OUTPATIENT
Start: 2021-05-06 | End: 2021-06-14

## 2021-05-06 NOTE — PROGRESS NOTES
[facility] Follow Up Note    Patient Name: Elba Casey  Encounter Date: 5 6 21    Today's Chief Concerns:   Concern #1:  Accutane follow up   Concern #2:     Concern #3:      Current Medications:    Current Outpatient Medications:     albuterol (PROVENTIL HFA,VENTOLIN HFA) 90 mcg/act inhaler, Inhale 2 puffs every 6 (six) hours as needed for wheezing or shortness of breath, Disp: 1 Inhaler, Rfl: 5    ARIPiprazole (ABILIFY) 2 mg tablet, TAKE 1 TABLET BY MOUTH EVERY DAY, Disp: 30 tablet, Rfl: 2    fluticasone (FLONASE) 50 mcg/act nasal spray, 1 spray into each nostril daily, Disp: 1 Bottle, Rfl: 2    ISOtretinoin (ACCUTANE) 30 MG capsule, Take 1 capsule (30 mg total) by mouth 2 (two) times a day Ipledge number: [id number], Disp: 60 capsule, Rfl: 0    levonorgestrel (MIRENA) 20 MCG/24HR IUD, 1 each by Intrauterine route once, Disp: , Rfl:     levothyroxine 50 mcg tablet, TAKE 1 TABLET BY MOUTH EVERY DAY, Disp: 90 tablet, Rfl: 1    mirtazapine (REMERON) 15 mg tablet, Take 1 tablet (15 mg total) by mouth daily at bedtime, Disp: 90 tablet, Rfl: 0    Rexulti 1 MG tablet, TAKE 1 TABLET BY MOUTH EVERY DAY, Disp: 30 tablet, Rfl: 2    amphetamine-dextroamphetamine (ADDERALL XR) 10 MG 24 hr capsule, Take 1 capsule (10 mg total) by mouth every morningMax Daily Amount: 10 mg, Disp: 30 capsule, Rfl: 0    clindamycin (CLEOCIN T) 1 % external solution, Apply topically 2 (two) times a day (Patient not taking: Reported on 5/6/2021), Disp: 30 mL, Rfl: 2    spironolactone (ALDACTONE) 25 mg tablet, TAKE 1 TABLET BY MOUTH EVERY DAY (Patient not taking: Reported on 5/6/2021), Disp: 30 tablet, Rfl: 8    CONSTITUTIONAL:   Vitals:         Specific Alerts:    Have you been seen by a [facility] Dermatologist in the last 3 years? YES    Are you pregnant or planning to become pregnant? No    Are you currently or planning to be nursing or breast feeding?  No    No Known Allergies    May we call your Preferred Phone number to discuss your specific medical information? YES    May we leave a detailed message that includes your specific medical information? YES    Have you traveled outside of the [facility] in the past 3 months? No    Do you currently have a pacemaker or defibrillator? No    Do you have any artificial heart valves, joints, plates, screws, rods, stents, pins, etc? No   - If Yes, were any placed within the last 2 years? Do you require any medications prior to a surgical procedure? No   - If Yes, for which procedure? - If Yes, what medications to you require? Are you taking any medications that cause you to bleed more easily ("blood thinners") No    Have you ever experienced a rapid heartbeat with epinephrine? No    Have you ever been treated with "gold" (gold sodium thiomalate) therapy? [facility] can help with wrinkles, "laugh lines," facial volume loss, "double chin," "love handles," age spots, and more  Are you interested in learning today about some of the skin enhancement procedures that we offer?  (If Yes, please provide more detail)       PSYCH: Normal mood and affect  EYES: Normal conjunctiva  ENT: Normal lips and oral mucosa  CARDIOVASCULAR: No edema  RESPIRATORY: Normal respirations  HEME/LYMPH/IMMUNO:  No regional lymphadenopathy except as noted below in ASSESSMENT AND PLAN BY DIAGNOSIS    FULL ORGAN SYSTEM SKIN EXAM (SKIN)   Hair, Scalp, Ears, Face Normal except as noted below in Assessment   Neck, Cervical Chain Nodes Normal except as noted below in Assessment   Right Arm/Hand/Fingers Normal except as noted below in Assessment   Left Arm/Hand/Fingers Normal except as noted below in Assessment   Chest/Breasts/Axillae Viewed areas Normal except as noted below in Assessment   Abdomen, Umbilicus Normal except as noted below in Assessment   Back/Spine Normal except as noted below in Assessment   Groin/Genitalia/Buttocks Viewed areas Normal except as noted below in Assessment   Right Leg, Foot, Toes Normal except as noted below in Assessment   Left Leg, Foot, Toes Normal except as noted below in Assessment         ISOTRETINOIN "ACCUTANE": FEMALE    Age: 24 y o  Weight (in KILOgrams): virtual visit  Pulse: virtual visit  Blood Pressure: virtual visit    Ipledge number: [id number]  Last 4 digits SS: [id number]    Contraception Type 1: implantable hormone  Contraception Type 2: male latex condom  Patient reminded that this must be listed in same order on iPledge   Yes       "Goal Dose" in mg (125 mg x weight in kg): 7937 5 mg    Interim month   "Daily Dose" of Isotretinoin the patient has actually been taking since last visit (this is usually what was prescribed): 60 mg   "Total # of Days" patient took Isotretinoin since last visit (this is usually 30):  30 days   "Total Monthly Dose" in mg since last visit (this equals "Daily Dose" x "Total # of Days"): 1,800 mg    Cumulative dosage   "Total cumulative Dose" in mg (add the above "Total Monthly Dose" with "Total Cumulative Dose" from last visit: 2780 mg        Symptoms   Conjunctivitis: No   Night Blindness/ Issues with night vision: No   Focusing Problems: No   Dry Lips/Eyes: YES   Dry Skin: YES   Rash: No   Nose Bleeds: No   Angular cheilitis (cracking in corner of lips): No   Headaches: No   Photosensitivity: No   Dry Anus: No   Depression: No   Mood Changes: No   Suicidal thoughts: No   Fatigue: No   Weight Loss: No   Muscle Pain/Stiffness: No   Abdominal pain: No   Diarrhea: No   Bloody stools: No    Physical Exam   Psychiatric/Mood: no significant changes   Anatomic Location Affected:  Face, back   Morphological Description:  o Open/Closed Comedones:  - Rare ("Almost Clear")  o Inflammatory Papules/Pustules:  - Several ("Moderate")  o Nodules:  - Many ("Severe")  o Scarring:  - Several ("Moderate")  o Excoriations:  - Few ("Mild")  o Local Skin Redness/Erythema:  - Many ("Severe")  o Local Skin Dryness/Scaling:  - Several ("Moderate")  o Local Skin Dyspigmentation:  - Few ("Mild")   Pertinent Positives:   Pertinent Negatives:    Labs   Date of last labs: 3/18/21   Completed: No   Labs reviewed: not done    Pregnancy test:per dr Magnus Joaquin patient is send picture of home pregnancy test on may 15th to get confirmed on I pledge  - Result: will send via my chart  - Interpretation- pregnant: pending      Additional History of Present Condition:  Patient stated she's having some flares on her back and it's painful    DIAGNOSES:     Acne Vulgaris   High Risk Medication   Medication Monitoring   Xerosis (see below for patient education)    Assessment and Plan:   Target Total Dose per KILOgram:  125 mg/KILOgram (this may change this on a patient-by-patient basis)   Planned daily dose for next 30 days: 60 mg daily add prednisone 10 mg daily for 14 days  Follow up between Petra 15 or June 20th   (please remember to add patient's "Ipledge number" to actual prescription):  [phone]   Return to clinic in about 1 month, please review ipledge guidelines in terms of timing (see below for patient education)   Get labs 1-2 days before next appointment: No   Patient confirmed in iPledge: No   Patients counseled:  - Cannot donate blood while taking isotretinoin and for 1 month after completing therapy  YES  - Do not share medication with anyone  YES  - Possible side effects discussed, including sun sensitivity, dry lips/eyes/skin, headaches, blurry vision (pseudotumor cerebri), muscle/joint aches, GI upset, bloody stools (IBD including Crohns/Ulcerative Colitis), jaundice, liver dysfunction, lipid abnormalities, bone marrow dysfunction, mood effects, thoughts of hurting oneself or others, and flaring of acne (acne fulminans/SAPPHO)  YES  - Females cannot get pregnant and must be on two forms of birth control while on therapy and at least one month after   YES  - Report any side effects of mood swings or depression and stop medication upon occurrence  YES  - Patient needs to confirm counseling in iPledge prior to picking up prescription  YES      Isotretinoin for Acne   Isotretinoin is a retinoid medication that is taken by mouth to treat severe nodular acne  Typically, it is used once other acne treatments have not worked, such as oral antibiotics  Usually isotretinoin is taken for 4 to 6 months, although the length of treatment can vary from person to person  While most patients acne improves and may even clear with this medication, in 20% of patients acne can come back  This requires additional acne treatment or even a second cycle of isotretinoin  How should I take isotretinoin?  Isotretinoin dosing is weight-based and should be taken exactly as prescribed   If you miss a dose, skip that dose  Do not take 2 doses at the same time   Take with food to help with absorption   All instructions in the iPLEDGE program packet (www [employer]) that was provided must be followed (see below for highlights)   You will get a 30 day supply of isotretinoin at a time  It cannot be automatically refilled  Make certain that you have been given enough medication to last 30 days as pharmacists are unable to refill or make changes within a month   You must return to your dermatologist every month to make sure you are not having any serious side effects from isotretinoin  For female patients, this visit will always include a monthly pregnancy test  Other laboratory studies, including liver function tests, cholesterol and triglycerides, must also be conducted before and during treatment  What should I avoid while taking isotretinoin?  Do not get pregnant from one month prior or 1 month following taking any isotretinoin   Do not donate blood while take isotretinoin or until 1 months after coming off the medication      Do not have cosmetic procedures to smooth your skin, including waxing, dermabrasion, or laser procedures, while taking this medication and for at least 6 months after you stop   Do not share isotretinoin with any other people  It can cause birth defects and other serious health problems   Do not use any other acne medications, including medicated washes, cleansers, creams or antibiotic pills during your treatment with isotretinoin unless expressly directed to by your dermatologist      Initiating isotretinoin & the iPLEDGE Program    The iPLEDGE Program is a strict, government-required program to prevent females from becoming pregnant while on isotretinoin  All females and males must participate  Note: Your provider must follow this program and cannot change any of the requirements   Before starting isotretinoin, your provider will talk to you about the safe use of this medication and you will need to sign consent forms in order to receive treatment   If you fail to keep appointments, you will be unable to get your prescription filled  Audrey Peters For females of childbearing age:      o Deborah Pi must be on two specific forms of birth control before starting isotretinoin  Your provider must get 2 negative pregnancy tests, at least 30 days apart, before you can proceed with the medication  The second pregnancy test must be obtained within 5 days of the menstrual cycle  If you choose not to be sexually active during treatment, you still must have the 2 negative pregnancy tests    o You must answer a series of questions either online or by phone every month prior to getting the prescription  o Monthly prescriptions must be filled within 7 days of that month's pregnancy test   It is important that you notify your physician well before the 7th day if there are any unforeseen delays, such as prior authorizations  o For more information, visit: [email or web address]    What are the possible side effects of isotretinoin? What should I do?    Most side effects from isotretinoin are mild and can be easily improved with simple remedies  Others are more concerning   Dry skin and eyes, chapped lips and dry nose that may lead to nosebleeds  o Dry Skin: Apply sensitive skin moisturizers to dry skin at least two times a day  You may need sunscreen (SPF 30) in the morning and to reapply when outside  o Dry Eyes: Use saline eye drops or artificial tears  o Dry Nose/Nosebleeds: Use saline nasal spray (ex  Ayr) during the day and at bedtime  To stop nosebleeds, hold pressure  If this does not work, call your dermatologist     o Chapped lips: apply petrolatum-based lip balms routinely  Avoid anything medicated   Contact your dermatologist for excessive dryness, cracks, tenderness or pain   Increased blood fats and cholesterol (usually in patients with a personal or family history of cholesterol or triglyceride problems)   Vision problems affecting your ability to see in the dark and drive at night   Bone, muscle and tendon aches can occur  Additional stretching before and after activities may help relieve aches  If you are otherwise healthy, consider the use of ibuprofen or naproxen  If you are unsure if you can use these pain medications, ask your doctor first  Also, call your doctor if you experience severe back pain, joint pain, or a broken bone    Changes in mood, including anxiety, depressive symptoms or suicidal thoughts which may or may not be temporary  Notify your doctor if your or a family member have suffered from these conditions or if you have any concerns during treatment   Serious birth defects or miscarriage can occur while taking this medication and for one month after taking your last dose of isotretinoin  You must not get pregnant while taking isotretinoin  Once the medication is out of your system, 30 days, there is no effect on the baby   Increased pressure in the brain   Call your doctor right away if you experience bad headache, blurred vision, dizziness, nausea or vomiting, or seizures   Skin rash - call your doctor right away if you develop any rashes or blisters on the skin   Liver damage - call your doctor right away if you experience severe stomach, chest or bowel pain, painful swallowing, diarrhea, blood in your stool, yellowing of your skin or eyes, or dark urine   Gastrointestinal bleeding  If you experience unusual abdominal pain or red or black/tarry stools, call your doctor immediately  You should also notify your doctor if you or a family member has a history of ulcerative colitis or Crohns disease   Worsening acne  Mild worsening of acne can occur in the first few weeks of using isotreinoin  If your acne is getting significantly worse, call your doctor  This may require temporarily stopping the isotretinoin and possibly adding other medications  XEROSIS ("DRY SKIN")    Dry skin refers to skin that feels dry to touch  Dry skin has a dull surface with a rough, scaly quality  The skin is less pliable and cracked  When dryness is severe, the skin may become inflamed and fissured  Although any body site can be dry, dry skin tends to affect the shins more than any other site  Dry skin is lacking moisture in the outer horny cell layer (stratum corneum) and this results in cracks in the skin surface  Dry skin is also called xerosis, xeroderma or asteatosis (lack of fat)  It can affect males and females of all ages  There is some racial variability in water and lipid content of the skin   Dry skin that starts in early childhood may be one of about 20 types of ichthyosis (fish-scale skin)  There is often a family history of dry skin   Dry skin is commonly seen in people with atopic dermatitis   Nearly everyone > 60 years has dry skin  Dry skin that begins later may be seen in people with certain diseases and conditions     Postmenopausal women   Hypothyroidism   Chronic renal disease  Malnutrition and weight loss    Subclinical dermatitis    Treatment with certain drugs such as oral retinoids, diuretics and epidermal growth factor receptor inhibitors    People exposed to a dry environment may experience dry skin   Low humidity: in desert climates or cool, windy conditions    Excessive air conditioning    Direct heat from a fire or fan heater    Excessive bathing    Contact with soap, detergents and solvents    Inappropriate topical agents such as alcohol    Frictional irritation from rough clothing or abrasives    Dry skin is due to abnormalities in the integrity of the barrier function of the stratum corneum, which is made up of corneocytes   There is an overall reduction in the lipids in the stratum corneum   Ratio of ceramides, cholesterol and free fatty acids may be normal or altered   There may be a reduction in the proliferation of keratinocytes   Keratinocyte subtypes change in dry skin with a decrease in keratins K1, K10 and increase in K5, K14     Involucrin (a protein) may be expressed early, increasing cell stiffness   The result is retention of corneocytes and reduced water-holding capacity  What is the treatment for dry skin? The mainstay of treatment of dry skin and ichthyosis is moisturisers/emollients  They should be applied liberally and often enough to:   Reduce itch    Improve the barrier function    Prevent entry of irritants, bacteria    Reduce transepidermal water loss  When considering which emollient is most suitable, consider:   Severity of the dryness    Tolerance    Personal preference    Cost and availability  Emollients generally work best if applied to damp skin, if pH is below 7 (acidic), and if containing humectants such as urea or propylene glycol    Additional treatments include:   Topical steroid if itchy or there is dermatitis -- choose an emollient base    Topical calcineurin inhibitors if topical steroids are unsuitable  How can dry skin be prevented? Eliminate aggravating factors:   Reduce the frequency of bathing   A humidifier in winter and air conditioner in summer    Compare having a short shower with a prolonged soak in a bath   Use lukewarm, not hot, water   Replace standard soap with a substitute such as a synthetic detergent cleanser, water-miscible emollient, bath oil, anti-pruritic tar oil, colloidal oatmeal etc     Apply an emollient liberally and often, particularly shortly after bathing, and when itchy  The drier the skin, the thicker this should be, especially on the hands  What is the outlook for dry skin? A tendency to dry skin may persist life-long, or it may improve once contributing factors are controlled  Scribe Attestation    I,:  Ramiro Bellamy am acting as a scribe while in the presence of the attending physician :       I,:  Mickey Cortes MD personally performed the services described in this documentation    as scribed in my presence :         Virtual Regular Visit      Assessment/Plan:    Problem List Items Addressed This Visit     None               Reason for visit is   Chief Complaint   Patient presents with    Virtual Regular Visit        Encounter provider Fe Delcid MD    Provider located at [address] [place] [id number] [phone]      Recent Visits  No visits were found meeting these conditions  Showing recent visits within past 7 days and meeting all other requirements     Today's Visits  Date Type Provider Dept   05/06/21 Telemedicine Mickey Cortes MD  [facility] [place]   Showing today's visits and meeting all other requirements     Future Appointments  No visits were found meeting these conditions  Showing future appointments within next 150 days and meeting all other requirements        The patient was identified by name and date of birth   Magno wray informed that this is a telemedicine visit and that the visit is being conducted through [facility] and patient was informed that this is a secure, HIPAA-compliant platform  She agrees to proceed     My office door was closed  The patient was notified the following individuals were present in the room filiberto guzman  She acknowledged consent and understanding of privacy and security of the video platform  The patient has agreed to participate and understands they can discontinue the visit at any time  Patient is aware this is a billable service  Subjective  Ladan Valencia is a 24 y o  female see patient report above         HPI     Past Medical History:   Diagnosis Date    Acne     ADHD (attention deficit hyperactivity disorder)     Borderline personality disorder ([facility] [address] )     Depression     Fracture of carpal bones     Hypothyroidism     Hypothyroidism (acquired)     PTSD (post-traumatic stress disorder)     Suicide and self-inflicted injury ([facility] ) [id number]    Suicide attempt ([facility] )        Past Surgical History:   Procedure Laterality Date    WISDOM TOOTH EXTRACTION         Current Outpatient Medications   Medication Sig Dispense Refill    albuterol (PROVENTIL HFA,VENTOLIN HFA) 90 mcg/act inhaler Inhale 2 puffs every 6 (six) hours as needed for wheezing or shortness of breath 1 Inhaler 5    ARIPiprazole (ABILIFY) 2 mg tablet TAKE 1 TABLET BY MOUTH EVERY DAY 30 tablet 2    fluticasone (FLONASE) 50 mcg/act nasal spray 1 spray into each nostril daily 1 Bottle 2    ISOtretinoin (ACCUTANE) 30 MG capsule Take 1 capsule (30 mg total) by mouth 2 (two) times a day Ipledge number: [id number] 60 capsule 0    levonorgestrel (MIRENA) 20 MCG/24HR IUD 1 each by Intrauterine route once      levothyroxine 50 mcg tablet TAKE 1 TABLET BY MOUTH EVERY DAY 90 tablet 1    mirtazapine (REMERON) 15 mg tablet Take 1 tablet (15 mg total) by mouth daily at bedtime 90 tablet 0    Rexulti 1 MG tablet TAKE 1 TABLET BY MOUTH EVERY DAY 30 tablet 2    amphetamine-dextroamphetamine (ADDERALL XR) 10 MG 24 hr capsule Take 1 capsule (10 mg total) by mouth every morningMax Daily Amount: 10 mg 30 capsule 0    clindamycin (CLEOCIN T) 1 % external solution Apply topically 2 (two) times a day (Patient not taking: Reported on 5/6/2021) 30 mL 2    spironolactone (ALDACTONE) 25 mg tablet TAKE 1 TABLET BY MOUTH EVERY DAY (Patient not taking: Reported on 5/6/2021) 30 tablet 8     No current facility-administered medications for this visit  No Known Allergies    Review of Systems    Video Exam    Vitals:       Physical Exam     I spent 10 minutes directly with the patient during this visit      VIRTUAL VISIT DISCLAIMER    Kahlil Curiel acknowledges that she has consented to an online visit or consultation  She understands that the online visit is based solely on information provided by her, and that, in the absence of a face-to-face physical evaluation by the physician, the diagnosis she receives is both limited and provisional in terms of accuracy and completeness  This is not intended to replace a full medical face-to-face evaluation by the physician  Kahlil Curiel understands and accepts these terms

## 2021-05-06 NOTE — PATIENT INSTRUCTIONS
Isotretinoin for Acne   Isotretinoin is a retinoid medication that is taken by mouth to treat severe nodular acne  Typically, it is used once other acne treatments have not worked, such as oral antibiotics  Usually isotretinoin is taken for 4 to 6 months, although the length of treatment can vary from person to person  While most patients acne improves and may even clear with this medication, in 20% of patients acne can come back  This requires additional acne treatment or even a second cycle of isotretinoin  How should I take isotretinoin?  Isotretinoin dosing is weight-based and should be taken exactly as prescribed   If you miss a dose, skip that dose  Do not take 2 doses at the same time   Take with food to help with absorption   All instructions in the iPLEDGE program packet (www [employer]) that was provided must be followed (see below for highlights)   You will get a 30 day supply of isotretinoin at a time  It cannot be automatically refilled  Make certain that you have been given enough medication to last 30 days as pharmacists are unable to refill or make changes within a month   You must return to your dermatologist every month to make sure you are not having any serious side effects from isotretinoin  For female patients, this visit will always include a monthly pregnancy test  Other laboratory studies, including liver function tests, cholesterol and triglycerides, must also be conducted before and during treatment  What should I avoid while taking isotretinoin?  Do not get pregnant from one month prior or 1 month following taking any isotretinoin   Do not donate blood while take isotretinoin or until 1 months after coming off the medication   Do not have cosmetic procedures to smooth your skin, including waxing, dermabrasion, or laser procedures, while taking this medication and for at least 6 months after you stop       Do not share isotretinoin with any other people  It can cause birth defects and other serious health problems   Do not use any other acne medications, including medicated washes, cleansers, creams or antibiotic pills during your treatment with isotretinoin unless expressly directed to by your dermatologist      Initiating isotretinoin & the iPLEDGE Program    The iPLEDGE Program is a strict, government-required program to prevent females from becoming pregnant while on isotretinoin  All females and males must participate  Note: Your provider must follow this program and cannot change any of the requirements   Before starting isotretinoin, your provider will talk to you about the safe use of this medication and you will need to sign consent forms in order to receive treatment   If you fail to keep appointments, you will be unable to get your prescription filled  Sheela Whitaker For females of childbearing age:      o Kindra Flores must be on two specific forms of birth control before starting isotretinoin  Your provider must get 2 negative pregnancy tests, at least 30 days apart, before you can proceed with the medication  The second pregnancy test must be obtained within 5 days of the menstrual cycle  If you choose not to be sexually active during treatment, you still must have the 2 negative pregnancy tests    o You must answer a series of questions either online or by phone every month prior to getting the prescription  o Monthly prescriptions must be filled within 7 days of that month's pregnancy test   It is important that you notify your physician well before the 7th day if there are any unforeseen delays, such as prior authorizations  o For more information, visit: [email or web address]    What are the possible side effects of isotretinoin? What should I do? Most side effects from isotretinoin are mild and can be easily improved with simple remedies  Others are more concerning      Dry skin and eyes, chapped lips and dry nose that may lead to nosebleeds  o Dry Skin: Apply sensitive skin moisturizers to dry skin at least two times a day  You may need sunscreen (SPF 30) in the morning and to reapply when outside  o Dry Eyes: Use saline eye drops or artificial tears  o Dry Nose/Nosebleeds: Use saline nasal spray (ex  Ayr) during the day and at bedtime  To stop nosebleeds, hold pressure  If this does not work, call your dermatologist     o Chapped lips: apply petrolatum-based lip balms routinely  Avoid anything medicated   Contact your dermatologist for excessive dryness, cracks, tenderness or pain   Increased blood fats and cholesterol (usually in patients with a personal or family history of cholesterol or triglyceride problems)   Vision problems affecting your ability to see in the dark and drive at night   Bone, muscle and tendon aches can occur  Additional stretching before and after activities may help relieve aches  If you are otherwise healthy, consider the use of ibuprofen or naproxen  If you are unsure if you can use these pain medications, ask your doctor first  Also, call your doctor if you experience severe back pain, joint pain, or a broken bone    Changes in mood, including anxiety, depressive symptoms or suicidal thoughts which may or may not be temporary  Notify your doctor if your or a family member have suffered from these conditions or if you have any concerns during treatment   Serious birth defects or miscarriage can occur while taking this medication and for one month after taking your last dose of isotretinoin  You must not get pregnant while taking isotretinoin  Once the medication is out of your system, 30 days, there is no effect on the baby   Increased pressure in the brain  Call your doctor right away if you experience bad headache, blurred vision, dizziness, nausea or vomiting, or seizures      Skin rash - call your doctor right away if you develop any rashes or blisters on the skin   Liver damage - call your doctor right away if you experience severe stomach, chest or bowel pain, painful swallowing, diarrhea, blood in your stool, yellowing of your skin or eyes, or dark urine   Gastrointestinal bleeding  If you experience unusual abdominal pain or red or black/tarry stools, call your doctor immediately  You should also notify your doctor if you or a family member has a history of ulcerative colitis or Crohns disease   Worsening acne  Mild worsening of acne can occur in the first few weeks of using isotreinoin  If your acne is getting significantly worse, call your doctor  This may require temporarily stopping the isotretinoin and possibly adding other medications  XEROSIS ("DRY SKIN")    Dry skin refers to skin that feels dry to touch  Dry skin has a dull surface with a rough, scaly quality  The skin is less pliable and cracked  When dryness is severe, the skin may become inflamed and fissured  Although any body site can be dry, dry skin tends to affect the shins more than any other site  Dry skin is lacking moisture in the outer horny cell layer (stratum corneum) and this results in cracks in the skin surface  Dry skin is also called xerosis, xeroderma or asteatosis (lack of fat)  It can affect males and females of all ages  There is some racial variability in water and lipid content of the skin   Dry skin that starts in early childhood may be one of about 20 types of ichthyosis (fish-scale skin)  There is often a family history of dry skin   Dry skin is commonly seen in people with atopic dermatitis   Nearly everyone > 60 years has dry skin  Dry skin that begins later may be seen in people with certain diseases and conditions     Postmenopausal women   Hypothyroidism   Chronic renal disease    Malnutrition and weight loss    Subclinical dermatitis    Treatment with certain drugs such as oral retinoids, diuretics and epidermal growth factor receptor inhibitors    People exposed to a dry environment may experience dry skin   Low humidity: in desert climates or cool, windy conditions    Excessive air conditioning    Direct heat from a fire or fan heater    Excessive bathing    Contact with soap, detergents and solvents    Inappropriate topical agents such as alcohol    Frictional irritation from rough clothing or abrasives    Dry skin is due to abnormalities in the integrity of the barrier function of the stratum corneum, which is made up of corneocytes   There is an overall reduction in the lipids in the stratum corneum   Ratio of ceramides, cholesterol and free fatty acids may be normal or altered   There may be a reduction in the proliferation of keratinocytes   Keratinocyte subtypes change in dry skin with a decrease in keratins K1, K10 and increase in K5, K14     Involucrin (a protein) may be expressed early, increasing cell stiffness   The result is retention of corneocytes and reduced water-holding capacity  What is the treatment for dry skin? The mainstay of treatment of dry skin and ichthyosis is moisturisers/emollients  They should be applied liberally and often enough to:   Reduce itch    Improve the barrier function    Prevent entry of irritants, bacteria    Reduce transepidermal water loss  When considering which emollient is most suitable, consider:   Severity of the dryness    Tolerance    Personal preference    Cost and availability  Emollients generally work best if applied to damp skin, if pH is below 7 (acidic), and if containing humectants such as urea or propylene glycol  Additional treatments include:   Topical steroid if itchy or there is dermatitis -- choose an emollient base    Topical calcineurin inhibitors if topical steroids are unsuitable  How can dry skin be prevented? Eliminate aggravating factors:   Reduce the frequency of bathing      A humidifier in winter and air conditioner in summer    Compare having a short shower with a prolonged soak in a bath   Use lukewarm, not hot, water   Replace standard soap with a substitute such as a synthetic detergent cleanser, water-miscible emollient, bath oil, anti-pruritic tar oil, colloidal oatmeal etc     Apply an emollient liberally and often, particularly shortly after bathing, and when itchy  The drier the skin, the thicker this should be, especially on the hands  What is the outlook for dry skin? A tendency to dry skin may persist life-long, or it may improve once contributing factors are controlled

## 2021-05-13 ENCOUNTER — IMMUNIZATIONS (OUTPATIENT)
Dept: FAMILY MEDICINE CLINIC | Facility: HOSPITAL | Age: 21
End: 2021-05-13

## 2021-05-13 DIAGNOSIS — Z23 ENCOUNTER FOR IMMUNIZATION: Primary | ICD-10-CM

## 2021-05-13 PROCEDURE — 0002A SARS-COV-2 / COVID-19 MRNA VACCINE (PFIZER-BIONTECH) 30 MCG: CPT

## 2021-05-13 PROCEDURE — 91300 SARS-COV-2 / COVID-19 MRNA VACCINE (PFIZER-BIONTECH) 30 MCG: CPT

## 2021-05-19 ENCOUNTER — DOCUMENTATION (OUTPATIENT)
Dept: DERMATOLOGY | Facility: CLINIC | Age: 21
End: 2021-05-19

## 2021-05-19 DIAGNOSIS — F90.2 ADHD (ATTENTION DEFICIT HYPERACTIVITY DISORDER), COMBINED TYPE: ICD-10-CM

## 2021-05-19 RX ORDER — DEXTROAMPHETAMINE SACCHARATE, AMPHETAMINE ASPARTATE MONOHYDRATE, DEXTROAMPHETAMINE SULFATE AND AMPHETAMINE SULFATE 2.5; 2.5; 2.5; 2.5 MG/1; MG/1; MG/1; MG/1
10 CAPSULE, EXTENDED RELEASE ORAL EVERY MORNING
Qty: 30 CAPSULE | Refills: 0 | Status: SHIPPED | OUTPATIENT
Start: 2021-05-19 | End: 2021-06-18

## 2021-05-19 RX ORDER — DEXTROAMPHETAMINE SACCHARATE, AMPHETAMINE ASPARTATE MONOHYDRATE, DEXTROAMPHETAMINE SULFATE AND AMPHETAMINE SULFATE 2.5; 2.5; 2.5; 2.5 MG/1; MG/1; MG/1; MG/1
10 CAPSULE, EXTENDED RELEASE ORAL EVERY MORNING
Qty: 30 CAPSULE | Refills: 0 | Status: CANCELLED | OUTPATIENT
Start: 2021-05-19 | End: 2021-06-18

## 2021-05-19 NOTE — TELEPHONE ENCOUNTER
Will ask covering provider to review in [facility] absence   Next appointment 6/18/21  With Dr Naif [facility] transfer)

## 2021-05-25 DIAGNOSIS — L70.0 ACNE VULGARIS: ICD-10-CM

## 2021-05-25 NOTE — TELEPHONE ENCOUNTER
ipledge is stating the dr needs to approve it for medication to be refill, can you please review and advise the pt, she is in need of her accutane

## 2021-05-25 NOTE — TELEPHONE ENCOUNTER
Please call patient and explain she has to have an appointment for me to confirm her in [address]    I got her pregnancy test results, but without a visit as well, I can not release the medication

## 2021-05-26 NOTE — TELEPHONE ENCOUNTER
Pt states she was just seen in May and was told all she had to do what do the pregnancy test and she would rec her medication, please advise does the pt need an addionital appt to rec this medication and if so she would like to sw the dr or babak lester

## 2021-05-27 RX ORDER — ISOTRETINOIN 30 MG/1
30 CAPSULE ORAL 2 TIMES DAILY
Qty: 60 CAPSULE | Refills: 0 | OUTPATIENT
Start: 2021-05-27 | End: 2021-06-26

## 2021-06-09 ENCOUNTER — TELEMEDICINE (OUTPATIENT)
Dept: DERMATOLOGY | Facility: CLINIC | Age: 21
End: 2021-06-09
Payer: COMMERCIAL

## 2021-06-09 DIAGNOSIS — L70.0 ACNE VULGARIS: Primary | ICD-10-CM

## 2021-06-09 DIAGNOSIS — Z51.81 MEDICATION MONITORING ENCOUNTER: ICD-10-CM

## 2021-06-09 DIAGNOSIS — Z79.899 HIGH RISK MEDICATION USE: ICD-10-CM

## 2021-06-09 DIAGNOSIS — L85.3 XEROSIS OF SKIN: ICD-10-CM

## 2021-06-09 PROCEDURE — 99214 OFFICE O/P EST MOD 30 MIN: CPT | Performed by: DERMATOLOGY

## 2021-06-09 RX ORDER — ISOTRETINOIN 30 MG/1
30 CAPSULE, LIQUID FILLED ORAL 2 TIMES DAILY
Qty: 60 CAPSULE | Refills: 0 | Status: SHIPPED | OUTPATIENT
Start: 2021-06-09 | End: 2021-07-09 | Stop reason: SDUPTHER

## 2021-06-09 RX ORDER — ISOTRETINOIN 20 MG/1
CAPSULE, LIQUID FILLED ORAL
COMMUNITY
Start: 2021-04-21 | End: 2021-06-09 | Stop reason: ALTCHOICE

## 2021-06-09 NOTE — PROGRESS NOTES
Virtual Regular Visit  Assessment/Plan:    Problem List Items Addressed This Visit     None      Visit Diagnoses     Acne vulgaris    -  Primary    Relevant Medications    Myorisan 30 MG capsule    High risk medication use        Relevant Medications    Myorisan 30 MG capsule    Xerosis of skin        Relevant Medications    Myorisan 30 MG capsule    Medication monitoring encounter        Relevant Medications    Myorisan 30 MG capsule        Reason for visit is   Chief Complaint   Patient presents with    Virtual Regular Visit        Encounter provider Joanne Ram MD    Provider located at [address] [place] [address]  [phone]      Recent Visits  No visits were found meeting these conditions  Showing recent visits within past 7 days and meeting all other requirements     Today's Visits  Date Type Provider Dept   06/09/21 Telemedicine Joanne Ram MD [facility] today's visits and meeting all other requirements     Future Appointments  No visits were found meeting these conditions  Showing future appointments within next 150 days and meeting all other requirements        The patient was identified by name and date of birth  Albina Rocha was informed that this is a telemedicine visit and that the visit is being conducted through [facility] and patient was informed that this is a secure, HIPAA-compliant platform  She agrees to proceed     My office door was closed  The patient was notified the following individuals were present in the [facility]  She acknowledged consent and understanding of privacy and security of the video platform  The patient has agreed to participate and understands they can discontinue the visit at any time  Patient is aware this is a billable service  Subjective  Albina Rocha is a 24 y o  female Please see derm note below         HPI     Past Medical History: Diagnosis Date    Acne     ADHD (attention deficit hyperactivity disorder)     Borderline personality disorder ([facility] )     Depression     Fracture of carpal bones     Hypothyroidism     Hypothyroidism (acquired)     PTSD (post-traumatic stress disorder)     Suicide and self-inflicted injury ([facility] ) 08/7620    Suicide attempt ([place] [id number] )        Past Surgical History:   Procedure Laterality Date    WISDOM TOOTH EXTRACTION         Current Outpatient Medications   Medication Sig Dispense Refill    albuterol (PROVENTIL HFA,VENTOLIN HFA) 90 mcg/act inhaler Inhale 2 puffs every 6 (six) hours as needed for wheezing or shortness of breath 1 Inhaler 5    amphetamine-dextroamphetamine (ADDERALL XR) 10 MG 24 hr capsule Take 1 capsule (10 mg total) by mouth every morningMax Daily Amount: 10 mg 30 capsule 0    ARIPiprazole (ABILIFY) 2 mg tablet TAKE 1 TABLET BY MOUTH EVERY DAY 30 tablet 2    clindamycin (CLEOCIN T) 1 % external solution Apply topically 2 (two) times a day (Patient not taking: Reported on 5/6/2021) 30 mL 2    fluticasone (FLONASE) 50 mcg/act nasal spray 1 spray into each nostril daily 1 Bottle 2    levonorgestrel (MIRENA) 20 MCG/24HR IUD 1 each by Intrauterine route once      levothyroxine 50 mcg tablet TAKE 1 TABLET BY MOUTH EVERY DAY 90 tablet 1    mirtazapine (REMERON) 15 mg tablet Take 1 tablet (15 mg total) by mouth daily at bedtime 90 tablet 0    Myorisan 30 MG capsule Take 1 capsule (30 mg total) by mouth 2 (two) times a day Ipledge number: [id number] 60 capsule 0    predniSONE 10 mg tablet Take 1 tablet by mouth daily for 14 days  14 tablet 0    Rexulti 1 MG tablet TAKE 1 TABLET BY MOUTH EVERY DAY 30 tablet 2    spironolactone (ALDACTONE) 25 mg tablet TAKE 1 TABLET BY MOUTH EVERY DAY (Patient not taking: Reported on 5/6/2021) 30 tablet 8     No current facility-administered medications for this visit           No Known Allergies    Review of Systems    Video Exam    There were no vitals filed for this visit  Physical Exam     I spent 20 minutes directly with the patient during this visit      VIRTUAL VISIT DISCLAIMER    Heather Law acknowledges that she has consented to an online visit or consultation  She understands that the online visit is based solely on information provided by her, and that, in the absence of a face-to-face physical evaluation by the physician, the diagnosis she receives is both limited and provisional in terms of accuracy and completeness  This is not intended to replace a full medical face-to-face evaluation by the physician  Heather Law understands and accepts these terms  ISOTRETINOIN "ACCUTANE"    Age: 24 y o  Weight (in KILOgrams): Virtual  Pulse: Virtual  Blood Pressure: Virtual    Ipledge number: [id number]  Last 4 digits SS: [id number]    Contraception Type 1: implantable hormone  Contraception Type 2: male latex condom  Patient reminded that this must be listed in same order on iPledge  Yes       "Goal Dose" in mg (125-150 mg x weight in kg): 7937 5 mg    Interim month   "Daily Dose" of Isotretinoin the patient has actually been taking since last visit (this is usually what was prescribed): 40 mg   "Total # of Days" patient took Isotretinoin since last visit (this is usually 30):  30 days   "Total Monthly Dose" in mg since last visit (this equals "Daily Dose" x "Total # of Days"): 1200 mg    Cumulative dosage   "Total cumulative Dose" in mg (add the above "Total Monthly Dose" with "Total Cumulative Dose" from last visit: 3980 mg      Feels that acne is overall improving  Has had few new acne breakouts over the past month  Patient reports  dryness but is otherwise tolerating the medication well       Symptoms   Conjunctivitis: No   Night Blindness/ Issues with night vision: No   Focusing Problems: No   Dry Lips/Eyes: YES   Dry Skin: No   Rash: No   Nose Bleeds: No   Angular cheilitis (cracking in corner of lips): No   Headaches: YES   Photosensitivity: No   Dry Anus: No   Depression: No   Mood Changes: No   Suicidal thoughts: No   Fatigue: No   Weight Loss: No   Muscle Pain/Stiffness: No   Abdominal pain: No   Diarrhea: No   Bloody stools: No    Physical Exam   Anatomic Location Affected:  face, chest, back   Morphological Description: open and closed comedones, erythematous papules, pustules, nodules, erythema       Labs   Pregnancy test: urine pregnancy at home because of COVID 19  - Result: negative  - Interpretation- pregnant: No    No results found for: CHOL  Lab Results   Component Value Date    HDL 62 02/27/2020     Lab Results   Component Value Date    LDLCALC 65 02/27/2020     Lab Results   Component Value Date    TRIG 109 03/08/2021    TRIG 56 02/12/2021    TRIG 60 02/27/2020     No results found for: CHOLHDL    Lab Results   Component Value Date    ALT 25 03/08/2021    AST 16 03/08/2021    ALKPHOS 50 03/08/2021       Lab Results   Component Value Date    PREGSERUM Negative 03/08/2021       DIAGNOSES:     Acne Vulgaris   High Risk Medication - on Accutane therapy    Xerosis (see below for patient education)    Assessment and Plan:    Acne vulgaris, on isotretinoin    Planned daily dose for next 30 days: 30 mg twice a day   - Confirmed patient status and counseling in iPLEDGE today  - Gentle skin care and regular SPF use  Stop all other acne treatments while on isotretinoin   -Recommend daily emollient for lips   - Side effects including but not limited to serious allergic reaction, thrombosis, mood changes, skeletal hyperostosis, premature epiphyseal closure, IBD, bone and muscle pain, HA, hyperlipidemia, photosensitivity, hepatotoxicity, dry skin and eyes, hair loss, teratogenicity (pregnancy category X) risk for up to 1 month after stopping medicine, 20-30% risk of recurrence were reviewed    - The patient understands not to donate blood, drink excessive amounts of alcohol, or share the medication with any individuals   Follow up in 1 month - home pregnancy test        Patients counseled:  - Cannot donate blood while taking isotretinoin and for 1 month after completing therapy  YES  - Do not share medication with anyone  YES  - Report any side effects of mood swings or depression and stop medication upon occurrence  YES  - Patient needs to confirm counseling in iPledge prior to picking up prescription  YES        Isotretinoin for Acne   Isotretinoin is a retinoid medication that is taken by mouth to treat severe nodular acne  Typically, it is used once other acne treatments have not worked, such as oral antibiotics  Usually isotretinoin is taken for 4 to 6 months, although the length of treatment can vary from person to person  While most patients acne improves and may even clear with this medication, in 20% of patients acne can come back  This requires additional acne treatment or even a second cycle of isotretinoin  How should I take isotretinoin?  Isotretinoin dosing is weight-based and should be taken exactly as prescribed   If you miss a dose, skip that dose  Do not take 2 doses at the same time   Take with fatty food to help with absorption   You will get a 30 day supply of isotretinoin at a time  It cannot be automatically refilled  Make certain that you have been given enough medication to last 30 days as pharmacists are unable to refill or make changes within a month   You must return to your dermatologist every month to make sure you are not having any serious side effects from isotretinoin  For female patients, this visit will always include a monthly pregnancy test  Other laboratory studies, including liver function tests, cholesterol and triglycerides, must also be conducted before and during treatment  What should I avoid while taking isotretinoin?  Do not get pregnant from one month prior or 1 month following taking any isotretinoin      Do not donate blood while take isotretinoin or until 1 months after coming off the medication   Do not have cosmetic procedures to smooth your skin, including waxing, dermabrasion, or laser procedures, while taking this medication and for at least 6 months after you stop   Do not share isotretinoin with any other people  It can cause birth defects and other serious health problems   Do not use any other acne medications, including medicated washes, cleansers, creams or antibiotic pills during your treatment with isotretinoin unless expressly directed to by your dermatologist      Initiating isotretinoin & the iPLEDGE Program    The iPLEDGE Program is a strict, government-required program to prevent females from becoming pregnant while on isotretinoin  All females and males must participate  Note: Your provider must follow this program and cannot change any of the requirements   If you fail to keep appointments, you will be unable to get your prescription filled  Mihai Sanchez For females of childbearing age:      o Luis Contreras must be on two specific forms of birth control   o You must answer a series of questions either online or by phone every month prior to getting the prescription  o Monthly prescriptions must be filled within 7 days of that month's pregnancy test   It is important that you notify your physician well before the 7th day if there are any unforeseen delays, such as prior authorizations  o For more information, visit: [email or web address] [email or web address]    What are the possible side effects of isotretinoin? What should I do? Most side effects from isotretinoin are mild and can be easily improved with simple remedies  Others are more concerning   Dry skin and eyes, chapped lips and dry nose that may lead to nosebleeds  o Dry Skin: Apply sensitive skin moisturizers to dry skin at least two times a day  You may need sunscreen (SPF 30) in the morning and to reapply when outside  o Dry Eyes: Use saline eye drops or artificial tears  o Dry Nose/Nosebleeds: Use saline nasal spray (ex  Ayr) during the day and at bedtime  To stop nosebleeds, hold pressure  If this does not work, call your dermatologist     o Chapped lips: apply petrolatum-based lip balms routinely  Avoid anything medicated   Contact your dermatologist for excessive dryness, cracks, tenderness or pain   Increased blood fats and cholesterol (usually in patients with a personal or family history of cholesterol or triglyceride problems)   Vision problems affecting your ability to see in the dark and drive at night   Bone, muscle and tendon aches can occur  Additional stretching before and after activities may help relieve aches  If you are otherwise healthy, consider the use of ibuprofen or naproxen  If you are unsure if you can use these pain medications, ask your doctor first  Also, call your doctor if you experience severe back pain, joint pain, or a broken bone    Changes in mood, including anxiety, depressive symptoms or suicidal thoughts which may or may not be temporary  Notify your doctor if your or a family member have suffered from these conditions or if you have any concerns during treatment   Serious birth defects or miscarriage can occur while taking this medication and for one month after taking your last dose of isotretinoin  You must not get pregnant while taking isotretinoin  Once the medication is out of your system, 30 days, there is no effect on the baby   Increased pressure in the brain  Call your doctor right away if you experience bad headache, blurred vision, dizziness, nausea or vomiting, or seizures   Skin rash - call your doctor right away if you develop any rashes or blisters on the skin   Liver damage - call your doctor right away if you experience severe stomach, chest or bowel pain, painful swallowing, diarrhea, blood in your stool, yellowing of your skin or eyes, or dark urine   Gastrointestinal bleeding   If you experience unusual abdominal pain or red or black/tarry stools, call your doctor immediately   Worsening acne  Mild worsening of acne can occur in the first few weeks of using isotreinoin  If your acne is getting significantly worse, call your doctor      Scribe Attestation    I,:  Kerri Savage MA am acting as a scribe while in the presence of the attending physician :       I,:  Roya Lincoln MD personally performed the services described in this documentation    as scribed in my presence :

## 2021-06-13 ENCOUNTER — HOSPITAL ENCOUNTER (EMERGENCY)
Facility: HOSPITAL | Age: 21
Discharge: HOME | End: 2021-06-13
Attending: EMERGENCY MEDICINE
Payer: COMMERCIAL

## 2021-06-13 ENCOUNTER — APPOINTMENT (EMERGENCY)
Dept: RADIOLOGY | Facility: HOSPITAL | Age: 21
End: 2021-06-13
Payer: COMMERCIAL

## 2021-06-13 VITALS
DIASTOLIC BLOOD PRESSURE: 63 MMHG | TEMPERATURE: 98.1 F | WEIGHT: 150 LBS | SYSTOLIC BLOOD PRESSURE: 111 MMHG | HEIGHT: 64 IN | HEART RATE: 83 BPM | BODY MASS INDEX: 25.61 KG/M2 | OXYGEN SATURATION: 98 % | RESPIRATION RATE: 20 BRPM

## 2021-06-13 DIAGNOSIS — K59.00 CONSTIPATION: ICD-10-CM

## 2021-06-13 DIAGNOSIS — R10.2 PELVIC PAIN: Primary | ICD-10-CM

## 2021-06-13 LAB
BILIRUB UR QL STRIP: NEGATIVE
CLARITY UR: CLEAR
COLOR UR: YELLOW
COLOR, POC: YELLOW
EXT PREG TEST URINE: NEGATIVE
EXT. CONTROL ED NAV: NORMAL
GLUCOSE UR STRIP-MCNC: NEGATIVE MG/DL
HGB UR QL STRIP.AUTO: NEGATIVE
KETONES UR STRIP-MCNC: NEGATIVE MG/DL
LEUKOCYTE ESTERASE UR QL STRIP: NEGATIVE
NITRITE UR QL STRIP: NEGATIVE
PH UR STRIP.AUTO: 7 [PH] (ref 4.5–8)
PROT UR STRIP-MCNC: NEGATIVE MG/DL
SP GR UR STRIP.AUTO: 1.01 (ref 1–1.03)
UROBILINOGEN UR QL STRIP.AUTO: 0.2 E.U./DL

## 2021-06-13 PROCEDURE — 99284 EMERGENCY DEPT VISIT MOD MDM: CPT

## 2021-06-13 PROCEDURE — 76830 TRANSVAGINAL US NON-OB: CPT

## 2021-06-13 PROCEDURE — 81025 URINE PREGNANCY TEST: CPT | Performed by: EMERGENCY MEDICINE

## 2021-06-13 PROCEDURE — 76856 US EXAM PELVIC COMPLETE: CPT

## 2021-06-13 PROCEDURE — 81003 URINALYSIS AUTO W/O SCOPE: CPT

## 2021-06-13 PROCEDURE — 99284 EMERGENCY DEPT VISIT MOD MDM: CPT | Performed by: EMERGENCY MEDICINE

## 2021-06-13 RX ORDER — ACETAMINOPHEN 325 MG/1
975 TABLET ORAL ONCE
Status: COMPLETED | OUTPATIENT
Start: 2021-06-13 | End: 2021-06-13

## 2021-06-13 RX ADMIN — ACETAMINOPHEN 975 MG: 325 TABLET, FILM COATED ORAL at 14:28

## 2021-06-13 NOTE — ED PROVIDER NOTES
History  Chief Complaint   Patient presents with    Abdominal Pain     Pt c/o lower abdominal pain/cramping x1 week  Pt states pain lasts about 30 mins when it come on       69-year-old female presents to the emergency department for evaluation of pelvic pain  The patient reports intermittent episodes over the past week that have been worsening  She describes the pain as a crampy suprapubic abdominal pain that she initially thought was related to menstrual cramps  Reports she has a history of this type of pain related to her menstrual cycle  She has been taking Advil with good relief of her pain  States that she called a friend earlier today who is a nurse and was told that she should come to the emergency department for further evaluation of her pain  She denies fevers, chills, nausea, vomiting, diarrhea and urinary symptoms  Prior to Admission Medications   Prescriptions Last Dose Informant Patient Reported? Taking?    ARIPiprazole (ABILIFY) 2 mg tablet   No No   Sig: TAKE 1 TABLET BY MOUTH EVERY DAY   Myorisan 30 MG capsule   No No   Sig: Take 1 capsule (30 mg total) by mouth 2 (two) times a day Ipledge number: [id number]   Rexulti 1 MG tablet   No No   Sig: TAKE 1 TABLET BY MOUTH EVERY DAY   albuterol (PROVENTIL HFA,VENTOLIN HFA) 90 mcg/act inhaler   No No   Sig: Inhale 2 puffs every 6 (six) hours as needed for wheezing or shortness of breath   amphetamine-dextroamphetamine (ADDERALL XR) 10 MG 24 hr capsule   No No   Sig: Take 1 capsule (10 mg total) by mouth every morningMax Daily Amount: 10 mg   clindamycin (CLEOCIN T) 1 % external solution  Self No No   Sig: Apply topically 2 (two) times a day   Patient not taking: Reported on 2021   fluticasone (FLONASE) 50 mcg/act nasal spray  Self No No   Si spray into each nostril daily   levonorgestrel (MIRENA) 20 MCG/24HR IUD  Self Yes No   Si each by Intrauterine route once   levothyroxine 50 mcg tablet   No No   Sig: TAKE 1 TABLET BY MOUTH EVERY DAY   mirtazapine (REMERON) 15 mg tablet   No No   Sig: Take 1 tablet (15 mg total) by mouth daily at bedtime   predniSONE 10 mg tablet   No No   Sig: Take 1 tablet by mouth daily for 14 days  spironolactone (ALDACTONE) 25 mg tablet   No No   Sig: TAKE 1 TABLET BY MOUTH EVERY DAY   Patient not taking: Reported on 5/6/2021      Facility-Administered Medications: None       Past Medical History:   Diagnosis Date    Acne     ADHD (attention deficit hyperactivity disorder)     Borderline personality disorder ([facility] [address] )     Depression     Fracture of carpal bones     Hypothyroidism     Hypothyroidism (acquired)     PTSD (post-traumatic stress disorder)     Suicide and self-inflicted injury ([facility] ) 09/0139    Suicide attempt [facility])        Past Surgical History:   Procedure Laterality Date    WISDOM TOOTH EXTRACTION         Family History   Problem Relation Age of Onset    Hypertension Family     Thyroid disease Family     Cancer Family     Heart disease Family     Diabetes Family     Hypertension Father     ADD / ADHD Father     ADD / ADHD Mother     Bipolar disorder Mother    [facility] ADD / ADHD Sister     ADD / ADHD Brother     Bipolar disorder Paternal Uncle     Uterine cancer Maternal Grandmother     Cervical cancer Maternal Grandmother     Endometrial cancer Maternal Grandmother     ADD / ADHD Brother     Breast cancer Paternal Aunt     Skin cancer Paternal Uncle      I have reviewed and agree with the history as documented      E-Cigarette/Vaping    E-Cigarette Use Current Every Day User      E-Cigarette/Vaping Substances     Social History     Tobacco Use    Smoking status: Current Every Day Smoker    Smokeless tobacco: Current User    Tobacco comment: vaping   Vaping Use    Vaping Use: Every day   Substance Use Topics    Alcohol use: Yes     Comment: socially     Drug use: Yes     Types: Marijuana     Comment: Rarely        Review of Systems   Constitutional: Negative for chills and fever    HENT: Negative for ear pain and sore throat  Eyes: Negative for pain and visual disturbance  Respiratory: Negative for cough and shortness of breath  Cardiovascular: Negative for chest pain and palpitations  Gastrointestinal: Positive for constipation  Negative for abdominal pain and vomiting  Genitourinary: Positive for pelvic pain  Negative for dysuria, hematuria, vaginal bleeding and vaginal discharge  Musculoskeletal: Negative for arthralgias and back pain  Skin: Negative for color change and rash  Neurological: Negative for seizures and syncope  All other systems reviewed and are negative  Physical Exam  ED Triage Vitals [06/13/21 1336]   Temperature Pulse Respirations Blood Pressure SpO2   98 1 °F (36 7 °C) 87 17 136/76 98 %      Temp Source Heart Rate Source Patient Position - Orthostatic VS BP Location FiO2 (%)   Oral Monitor Lying Right arm --      Pain Score       4             Orthostatic Vital Signs  Vitals:    06/13/21 1336 06/13/21 1625   BP: 136/76 111/63   Pulse: 87 83   Patient Position - Orthostatic VS: Lying        Physical Exam  Vitals and nursing note reviewed  Constitutional:       General: She is not in acute distress  Appearance: She is well-developed  HENT:      Head: Normocephalic and atraumatic  Eyes:      Conjunctiva/sclera: Conjunctivae normal    Cardiovascular:      Rate and Rhythm: Normal rate and regular rhythm  Heart sounds: No murmur heard  Pulmonary:      Effort: Pulmonary effort is normal  No respiratory distress  Breath sounds: Normal breath sounds  Abdominal:      Palpations: Abdomen is soft  Tenderness: There is abdominal tenderness in the suprapubic area  There is no guarding or rebound  Negative signs include Harman's sign  Genitourinary:     Comments: Patient declined  exam  Musculoskeletal:      Cervical back: Neck supple  Skin:     General: Skin is warm and dry     Neurological:      Mental Status: She is alert  ED Medications  Medications   acetaminophen (TYLENOL) tablet 975 mg (975 mg Oral Given 6/13/21 1428)       Diagnostic Studies  Results Reviewed     Procedure Component Value Units Date/Time    POCT pregnancy, urine [[id number]]  (Normal) Resulted: 06/13/21 1434    Lab Status: Final result Updated: 06/13/21 1434     EXT PREG TEST UR (Ref: Negative) negative     Control valid    POCT urinalysis dipstick [[id number]]  (Normal) Resulted: 06/13/21 1434    Lab Status: Final result Specimen: Urine Updated: 06/13/21 1434     Color, UA yellow    Urine Macroscopic, POC [[id number]] Collected: 06/13/21 1430    Lab Status: Final result Specimen: Urine Updated: 06/13/21 1431     Color, UA Yellow     Clarity, UA Clear     pH, UA 7 0     Leukocytes, UA Negative     Nitrite, UA Negative     Protein, UA Negative mg/dl      Glucose, UA Negative mg/dl      Ketones, UA Negative mg/dl      Urobilinogen, UA 0 2 E U /dl      Bilirubin, UA Negative     Blood, UA Negative     Specific Gravity, UA 1 015    Narrative:      CLINITEK RESULT                 US pelvis complete w transvaginal   Final Result by Ramiro Cartagena MD (06/13 1702)       1  IUD present within the endometrial canal appearing to be in appropriate position   2  Initial thickness approximately 4 mm  No evidence of adnexal mass or cyst    3  Small amount free fluid in the pelvis                         Workstation performed: [id number]               Procedures  Procedures      ED Course                                       MDM  Number of Diagnoses or Management Options  Constipation  Pelvic pain  Diagnosis management comments: 24-year-old female presented to the emergency department for evaluation of pelvic pain  On arrival the patient was awake, alert, oriented and in no acute distress  Initial vital signs stable  The patient was afebrile  On exam the patient had mild tenderness to palpation over suprapubic area    Workup done in the emergency department was unremarkable including a pelvic ultrasound  The patient was treated symptomatically with Tylenol and on re-evaluation she reported improvement of her symptoms  She is appropriate for discharge at this time with recommendation to follow up with her PCP for with OBGYN for continued symptoms  Her the patient instructed to increase fiber intake her constipation  Return precautions were discussed  Patient agrees with the plan for discharge and feels comfortable to go home with proper f/u  Advised to return for worsening or additional problems  Diagnostic tests were reviewed and questions answered  Diagnosis, care plan and treatment options were discussed  The patient understands instructions and will follow up as directed  Disposition  Final diagnoses:   Pelvic pain   Constipation     Time reflects when diagnosis was documented in both MDM as applicable and the Disposition within this note     Time User Action Codes Description Comment    6/13/2021  4:38 PM Young Overall Add [R10 2] Pelvic pain     6/13/2021  4:38 PM Young Overall Add [K59 00] Constipation       ED Disposition     ED Disposition Condition Date/Time Comment    Discharge Stable Sun Jun 13, 2021  5:07 PM Felisha Gallardo discharge to home/self care  Follow-up Information     Follow up With Specialties Details Why Contact Info Additional Information    Aidan Oscar MD Family Medicine Schedule an appointment as soon as possible for a visit   [phone]   [place] [id number]  [phone]       [address] [facility] Obstetrics and Gynecology Schedule an appointment as soon as possible for a visit  As needed [address]  [address] [place] [address], [address] [address] Emergency Department Emergency Medicine Go to If symptoms worsen [address]  [address] Emergency Department, [address], [place], [place], [address]          Discharge Medication List as of 6/13/2021  5:07 PM      CONTINUE these medications which have NOT CHANGED    Details   albuterol (PROVENTIL HFA,VENTOLIN HFA) 90 mcg/act inhaler Inhale 2 puffs every 6 (six) hours as needed for wheezing or shortness of breath, Starting Tue 2/23/2021, Normal      amphetamine-dextroamphetamine (ADDERALL XR) 10 MG 24 hr capsule Take 1 capsule (10 mg total) by mouth every morningMax Daily Amount: 10 mg, Starting Wed 5/19/2021, Until Fri 6/18/2021, Normal      ARIPiprazole (ABILIFY) 2 mg tablet TAKE 1 TABLET BY MOUTH EVERY DAY, Normal      clindamycin (CLEOCIN T) 1 % external solution Apply topically 2 (two) times a day, Starting Tue 7/21/2020, Normal      fluticasone (FLONASE) 50 mcg/act nasal spray 1 spray into each nostril daily, Starting Mon 12/2/2019, Normal      levonorgestrel (MIRENA) 20 MCG/24HR IUD 1 each by Intrauterine route once, Historical Med      levothyroxine 50 mcg tablet TAKE 1 TABLET BY MOUTH EVERY DAY, Normal      mirtazapine (REMERON) 15 mg tablet Take 1 tablet (15 mg total) by mouth daily at bedtime, Starting Mon 4/5/2021, Until Sun 7/4/2021, Normal      Myorisan 30 MG capsule Take 1 capsule (30 mg total) by mouth 2 (two) times a day Ipledge number: [id number], Starting Wed 6/9/2021, Until Fri 7/9/2021, Normal      predniSONE 10 mg tablet Take 1 tablet by mouth daily for 14 days  , Normal      Rexulti 1 MG tablet TAKE 1 TABLET BY MOUTH EVERY DAY, Normal      spironolactone (ALDACTONE) 25 mg tablet TAKE 1 TABLET BY MOUTH EVERY DAY, Normal           No discharge procedures on file  PDMP Review       Value Time User    PDMP Reviewed  Yes 5/19/2021  4:44 PM Ebenezer Forbes MD           ED Provider  Attending physically available and evaluated [facility]   I managed the patient along with the ED Attending      Electronically Signed by         Ryann Owusu MD  06/13/21 [address] [facility] Sil Alejandro MD  06/13/21 3287

## 2021-06-13 NOTE — ED ATTENDING ATTESTATION
6/13/2021  ISulma MD, saw and evaluated the patient  I have discussed the patient with the resident/non-physician practitioner and agree with the resident's/non-physician practitioner's findings, Plan of Care, and MDM as documented in the resident's/non-physician practitioner's note, except where noted  All available labs and Radiology studies were reviewed  I was present for key portions of any procedure(s) performed by the resident/non-physician practitioner and I was immediately available to provide assistance  At this point I agree with the current assessment done in the Emergency Department  I have conducted an independent evaluation of this patient a history and physical is as follows:    ED Course     18-year-old female, history of hypothyroidism on stable Synthroid dose since 2016, presenting to the emergency department for evaluation of 1 week of intermittent crampy suprapubic and just to the right of suprapubic pelvic pain  Patient reports crampy pain which last for approximately 30 minutes and then spontaneously resolves  Patient notes decreased number of bowel movements over the same period of time, but states she that she does not feel like she is constipated  She states that this morning she was attempting to move her bowels and had increased pain in the area where she has been experiencing cramping  Patient does not know when she is due to have a menses, as she has been irregular since having implantation IUD  Patient denies any urinary symptoms including dysuria, urinary frequency, and urgency  Patient denies any vaginal discharge  Ten systems reviewed and negative except as noted in the history of present illness  The patient is resting comfortably on a stretcher in no acute respiratory distress  The patient appears nontoxic  HEENT reveals moist mucous membranes  Head is normocephalic and atraumatic   Conjunctiva and sclera are normal  Neck is nontender and supple with full range of motion to flexion, extension, lateral rotation  No meningismus appreciated  No masses are appreciated  Lungs are clear to auscultation bilaterally without any wheezes, rales or rhonchi  Heart is regular rate and rhythm without any murmurs, rubs or gallops  Abdomen is nondistended, soft, with tenderness to palpation in the suprapubic pelvic area as well as just to the right of suprapubic area  There is no rebound or guarding  Extremities appear grossly normal without any significant arthropathy  Patient is awake, alert, and oriented x3  The patient has normal interaction  Motor is 5 out of 5  Evaluation for ovarian cysts, ovarian torsion, urinary tract infection, complications of pregnancy  Patient's exam is not consistent with acute appendicitis and given the duration of symptoms for approximately 1 week without a characteristic exam or fever, I do not believe that this is a concern  Labs Reviewed   POCT URINALYSIS DIPSTICK - Normal       Result Value Ref Range Status    Color, UA yellow   Final   POCT PREGNANCY, URINE - Normal    EXT PREG TEST UR (Ref: Negative) negative   Final    Control valid   Final   URINE MACROSCOPIC, POC    Color, UA Yellow   Final    Clarity, UA Clear   Final    pH, UA 7 0  4 5 - 8 0 Final    Leukocytes, UA Negative  Negative Final    Nitrite, UA Negative  Negative Final    Protein, UA Negative  Negative mg/dl Final    Glucose, UA Negative  Negative mg/dl Final    Ketones, UA Negative  Negative mg/dl Final    Urobilinogen, UA 0 2  0 2, 1 0 E U /dl E U /dl Final    Bilirubin, UA Negative  Negative Final    Blood, UA Negative  Negative Final    Specific [place], UA 1 015  1 003 - 1 030 Final    Narrative:     CLINITEK RESULT       US pelvis complete w transvaginal   Final Result       1  IUD present within the endometrial canal appearing to be in appropriate position   2  Initial thickness approximately 4 mm   No evidence of adnexal mass or cyst    3  Small amount free fluid in the pelvis                         Workstation performed: [id number]                       Critical Care Time  Procedures

## 2021-06-14 ENCOUNTER — TELEMEDICINE (OUTPATIENT)
Dept: FAMILY MEDICINE CLINIC | Facility: CLINIC | Age: 21
End: 2021-06-14
Payer: COMMERCIAL

## 2021-06-14 VITALS — WEIGHT: 150 LBS | BODY MASS INDEX: 25.61 KG/M2 | TEMPERATURE: 98.2 F | HEIGHT: 64 IN

## 2021-06-14 DIAGNOSIS — B37.3 VAGINAL YEAST INFECTION: Primary | ICD-10-CM

## 2021-06-14 PROCEDURE — 99214 OFFICE O/P EST MOD 30 MIN: CPT | Performed by: FAMILY MEDICINE

## 2021-06-14 PROCEDURE — 3008F BODY MASS INDEX DOCD: CPT | Performed by: STUDENT IN AN ORGANIZED HEALTH CARE EDUCATION/TRAINING PROGRAM

## 2021-06-14 RX ORDER — FLUCONAZOLE 150 MG/1
150 TABLET ORAL ONCE
Qty: 1 TABLET | Refills: 0 | Status: SHIPPED | OUTPATIENT
Start: 2021-06-14 | End: 2021-06-14

## 2021-06-14 NOTE — PROGRESS NOTES
Virtual Regular Visit      Assessment/Plan:    Problem List Items Addressed This Visit     None      Visit Diagnoses     Vaginal yeast infection    -  Primary    Use diflucan  Return if no improvements  Relevant Medications    fluconazole (DIFLUCAN) 150 mg tablet               Reason for visit is   Chief Complaint   Patient presents with    Virtual Brief Visit    Virtual Regular Visit        Encounter provider Clark Andino MD    Provider located at [address] [address]      Recent Visits  No visits were found meeting these conditions  Showing recent visits within past 7 days and meeting all other requirements  Today's Visits  Date Type Provider Dept   06/14/21 Telemedicine MD Cortez Soriano today's visits and meeting all other requirements  Future Appointments  No visits were found meeting these conditions  Showing future appointments within next 150 days and meeting all other requirements       The patient was identified by name and date of birth  Albina Rocha was informed that this is a telemedicine visit and that the visit is being conducted through [address] [place] [address] Now and patient was informed that this is a secure, HIPAA-compliant platform  She agrees to proceed     My office door was closed  No one else was in the room  She acknowledged consent and understanding of privacy and security of the video platform  The patient has agreed to participate and understands they can discontinue the visit at any time  Patient is aware this is a billable service  Subjective  Albina Rocha is a 24 y o  female    HPI   Diana Diamond is reporting vaginal itching for the last 2 days and a white chunky discharge starting yesterday  She has an occasional vaginal yeast infections  Currently her symptoms are typical to past infections  No dysuria, pain with intercourse, foul odor or abd pain       Past Medical History:   Diagnosis Date    Acne     ADHD (attention deficit hyperactivity disorder)     Borderline personality disorder ([place] [address] )     Depression     Fracture of carpal bones     Hypothyroidism     Hypothyroidism (acquired)     PTSD (post-traumatic stress disorder)     Suicide and self-inflicted injury ([place] [address] ) 47/4815    Suicide attempt ([place] [address] )        Past Surgical History:   Procedure Laterality Date    WISDOM TOOTH EXTRACTION         Current Outpatient Medications   Medication Sig Dispense Refill    albuterol (PROVENTIL HFA,VENTOLIN HFA) 90 mcg/act inhaler Inhale 2 puffs every 6 (six) hours as needed for wheezing or shortness of breath 1 Inhaler 5    amphetamine-dextroamphetamine (ADDERALL XR) 10 MG 24 hr capsule Take 1 capsule (10 mg total) by mouth every morningMax Daily Amount: 10 mg 30 capsule 0    ARIPiprazole (ABILIFY) 2 mg tablet TAKE 1 TABLET BY MOUTH EVERY DAY 30 tablet 2    clindamycin (CLEOCIN T) 1 % external solution Apply topically 2 (two) times a day 30 mL 2    fluticasone (FLONASE) 50 mcg/act nasal spray 1 spray into each nostril daily 1 Bottle 2    levonorgestrel (MIRENA) 20 MCG/24HR IUD 1 each by Intrauterine route once      levothyroxine 50 mcg tablet TAKE 1 TABLET BY MOUTH EVERY DAY 90 tablet 1    mirtazapine (REMERON) 15 mg tablet Take 1 tablet (15 mg total) by mouth daily at bedtime 90 tablet 0    Myorisan 30 MG capsule Take 1 capsule (30 mg total) by mouth 2 (two) times a day Ipledge number: [id number] 60 capsule 0    Rexulti 1 MG tablet TAKE 1 TABLET BY MOUTH EVERY DAY 30 tablet 2    spironolactone (ALDACTONE) 25 mg tablet TAKE 1 TABLET BY MOUTH EVERY DAY 30 tablet 8    fluconazole (DIFLUCAN) 150 mg tablet Take 1 tablet (150 mg total) by mouth once for 1 dose 1 tablet 0     No current facility-administered medications for this visit  No Known Allergies    Review of Systems   Constitutional: Negative for fever and unexpected weight change     HENT: Negative for ear pain, sore throat and trouble swallowing  Eyes: Negative for pain and visual disturbance  Respiratory: Negative for cough, chest tightness, shortness of breath and wheezing  Cardiovascular: Negative for chest pain  Gastrointestinal: Negative for abdominal distention, abdominal pain, blood in stool, constipation, diarrhea, nausea and vomiting  Endocrine: Negative for polydipsia and polyuria  Genitourinary: Positive for vaginal discharge  Negative for dysuria and hematuria  Musculoskeletal: Negative for back pain and myalgias  Skin: Negative for rash  Neurological: Negative for syncope and headaches  Psychiatric/Behavioral: Negative for suicidal ideas  Video Exam    Vitals:    06/14/21 0939   Temp: 98 2 °F (36 8 °C)   TempSrc: Oral   Weight: 68 kg (150 lb)   Height: 5' 4" (1 626 m)       Physical Exam  Constitutional:       Appearance: She is well-developed  HENT:      Head: Normocephalic and atraumatic  Right Ear: External ear normal       Left Ear: External ear normal    Eyes:      General: No scleral icterus  Conjunctiva/sclera: Conjunctivae normal    Pulmonary:      Effort: Pulmonary effort is normal  No respiratory distress  Musculoskeletal:      Cervical back: Normal range of motion  Neurological:      Mental Status: She is alert and oriented to person, place, and time  I spent 4w minutes with patient today in which greater than 50% of the time was spent in counseling/coordination of care regarding vaginal discharge      VIRTUAL VISIT [address] acknowledges that she has consented to an online visit or consultation  She understands that the online visit is based solely on information provided by her, and that, in the absence of a face-to-face physical evaluation by the physician, the diagnosis she receives is both limited and provisional in terms of accuracy and completeness   This is not intended to replace a full medical face-to-face evaluation by the physician  Anisha Turner understands and accepts these terms

## 2021-06-16 ENCOUNTER — TELEPHONE (OUTPATIENT)
Dept: DERMATOLOGY | Facility: CLINIC | Age: 21
End: 2021-06-16

## 2021-06-16 NOTE — TELEPHONE ENCOUNTER
[facility] is calling to confirm I-Pleadge start date for pt's prescription  They would just like the confirmation date, I believe it was at the time of the apt on 6/9? Please contact pharmacy at  so they will release prescription to pt  Thank you!

## 2021-06-16 NOTE — TELEPHONE ENCOUNTER
Please call pharmacy and tell them to release the prescription  June 17 is the last date to  script  Patient needs to log on to ipledge and answer her questions

## 2021-06-18 ENCOUNTER — OFFICE VISIT (OUTPATIENT)
Dept: PSYCHIATRY | Facility: CLINIC | Age: 21
End: 2021-06-18
Payer: COMMERCIAL

## 2021-06-18 DIAGNOSIS — F60.3 BORDERLINE PERSONALITY DISORDER (HCC): ICD-10-CM

## 2021-06-18 DIAGNOSIS — F41.1 GENERALIZED ANXIETY DISORDER: Primary | ICD-10-CM

## 2021-06-18 DIAGNOSIS — F51.01 PRIMARY INSOMNIA: ICD-10-CM

## 2021-06-18 DIAGNOSIS — F43.10 PTSD (POST-TRAUMATIC STRESS DISORDER): ICD-10-CM

## 2021-06-18 DIAGNOSIS — F33.1 MODERATE EPISODE OF RECURRENT MAJOR DEPRESSIVE DISORDER (HCC): ICD-10-CM

## 2021-06-18 PROCEDURE — 99214 OFFICE O/P EST MOD 30 MIN: CPT | Performed by: STUDENT IN AN ORGANIZED HEALTH CARE EDUCATION/TRAINING PROGRAM

## 2021-06-18 PROCEDURE — 3725F SCREEN DEPRESSION PERFORMED: CPT | Performed by: STUDENT IN AN ORGANIZED HEALTH CARE EDUCATION/TRAINING PROGRAM

## 2021-06-18 PROCEDURE — 90833 PSYTX W PT W E/M 30 MIN: CPT | Performed by: STUDENT IN AN ORGANIZED HEALTH CARE EDUCATION/TRAINING PROGRAM

## 2021-06-18 PROCEDURE — 4004F PT TOBACCO SCREEN RCVD TLK: CPT | Performed by: STUDENT IN AN ORGANIZED HEALTH CARE EDUCATION/TRAINING PROGRAM

## 2021-06-18 RX ORDER — DOXEPIN HYDROCHLORIDE 10 MG/1
10 CAPSULE ORAL
Qty: 30 CAPSULE | Refills: 1 | Status: SHIPPED | OUTPATIENT
Start: 2021-06-18 | End: 2021-07-12

## 2021-06-18 RX ORDER — FLUOXETINE HYDROCHLORIDE 20 MG/1
20 CAPSULE ORAL DAILY
Qty: 30 CAPSULE | Refills: 1 | Status: SHIPPED | OUTPATIENT
Start: 2021-06-18 | End: 2021-07-12

## 2021-06-18 RX ORDER — HYDROXYZINE HYDROCHLORIDE 25 MG/1
25 TABLET, FILM COATED ORAL EVERY 8 HOURS PRN
Qty: 90 TABLET | Refills: 0 | Status: SHIPPED | OUTPATIENT
Start: 2021-06-18 | End: 2021-07-12

## 2021-06-18 NOTE — TELEPHONE ENCOUNTER
Wednesday, June 16, 2021 10:25 AM    Patient calling regarding her accutane prescription  "[facility] didn't tell me my prescription was ready until today so I'm outside of the window  I'm wondering what we do next? Do I need to make another appointment?  Do I you have to submit another pregnancy test? I've been going without a month for a month now without this medication and I like to get back on track"

## 2021-06-18 NOTE — PSYCH
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  Psychopharmacologically, I spoke at length with Radha Cardozo about options for medication management   Given that her history is NOT suggestive of bipolar disorder and likely just PTSD, MDD, MARCI, and personality pathology, treatment at this juncture with SSRI is appropriate  Evelyn Alvarado is concerned about weight gain so Prozac is treatment of choise to manage all 3 Axis I diagnoses  Will start Prozac 20mg Daily  Additionally, to manage panic symptomatology, will start PRN Hydroxyzine 25mg TID  Want to avoid benzo's given ongoing use of cannabis  Will add Doxepin 10mg QHS for sleep as Seroquel and Mirtazapine caused weight gain and use of trazodone in the past provided limited benefit  Risks/benefits/alternativies to treatment discussed, including a myriad of potential adverse medication side effects, to which Evelyn Alvarado voiced understanding and consented fully to treatment  DSM-V Diagnoses:     1 ) PTSD  2 ) MDD  3 ) MARCI with panic attacks  4 ) ADHD  5 ) Insomnia  6   Borderline Personality Disorder    Treatment Recommendations/Precautions:    1 ) PTSD  - Start Prozac 20mg Daily  - Highly encouraged to seek out weekly psychotherapy - specifically DBT or Trauma based therapy  - Psychoeducation provided regarding the importance of exercise and healthy dietary choices and their impact on mood, energy, and motivation  - Counseled to avoid ETOH, illict substances, and nicotine secondary to the detrimental effects of these substances on mental and physical health  - Encouraged to engage in non-verbal forms of therapy such as art therapy, music therapy, and mindfulness  - Discussed the bio-psycho-social model to treatment and therapeutic exercises/interventions were attempted to cognitively restructure thoughts    2 ) MDD  - Start Prozac 20mg Daily  - Highly encouraged to seek out weekly psychotherapy - specifically DBT or Trauma based therapy  - Psychoeducation provided regarding the importance of exercise and healthy dietary choices and their impact on mood, energy, and motivation  - Counseled to avoid ETOH, illict substances, and nicotine secondary to the detrimental effects of these substances on mental and physical health  - Encouraged to engage in non-verbal forms of therapy such as art therapy, music therapy, and mindfulness  - Discussed the bio-psycho-social model to treatment and therapeutic exercises/interventions were attempted to cognitively restructure thoughts    3 ) MARCI with panic attacks  - Start Prozac 20mg Daily  - Start PRN Hydroxyzine 25mg TID for breakthrough symptoms      4 ) ADHD  - No current need for psychostimulants at this juncture  - Discontinue Adderall XR 10mg    5 ) Insomnia  - Start Doxepin 10mg QHS      6  Borderline Personality Disorder  - Highly encouraged to seek out weekly psychotherapy - specifically DBT or Trauma based therapy        Medication management every 6 weeks  Referral for individual psychotherapy  Aware of need to follow up with family physician for medical issues  Aware of 24 hour and weekend coverage for urgent situations accessed by calling [facility] main practice number    Medications Risks/Benefits      Risks, Benefits And Possible Side Effects Of Medications:    Risks, benefits, and possible side effects of medications explained to Marco including risk of suicidality and serotonin syndrome related to treatment with antidepressants  She verbalizes understanding and agreement for treatment  Risks of medications in pregnancy explained to Marco  She verbalizes understanding and agrees to notify her doctor if she becomes pregnant  Controlled Medication Discussion:     No recent records found for controlled prescriptions according to [place] Prescription Drug Monitoring Program    Psychotherapy Provided:     Individual psychotherapy provided: Yes  Counseling was provided during the session today for 25 minutes  Medication changes discussed with Marco  Medication education provided to Marco    Recent stressor including family issues, job stress, recent medication change, social difficulties, everyday stressors and ongoing anxiety discussed with Awais Eng  Coping strategies including abstaining from substance use, compliance with medications, engaging in previously avoided activities, exercising, getting into a good routine, improving self-esteem, increasing social contact, reducing excessive guilt, reducing fatigue and stress reduction reviewed with Awais Eng  Educated on importance of medication and treatment compliance  Supportive therapy provided  Treatment Plan:    Completed and signed during the session: Not applicable - Treatment Plan not due at this session    Note Share Disclaimer:      This note was not shared with the patient due to reasonable likelihood of causing patient harm    Kayleen Greene MD 06/18/21

## 2021-06-18 NOTE — TELEPHONE ENCOUNTER
Call and let patient know       "The patient's Program Status is Required to Demonstrate Comprehension  The patient must answer her Comprehension questions, and then may have her prescription filled before 11:59 PM Eastern Time on 06/19/2021  "

## 2021-07-09 ENCOUNTER — TELEMEDICINE (OUTPATIENT)
Dept: DERMATOLOGY | Facility: CLINIC | Age: 21
End: 2021-07-09
Payer: COMMERCIAL

## 2021-07-09 DIAGNOSIS — Z51.81 MEDICATION MONITORING ENCOUNTER: ICD-10-CM

## 2021-07-09 DIAGNOSIS — L85.3 XEROSIS OF SKIN: ICD-10-CM

## 2021-07-09 DIAGNOSIS — Z79.899 HIGH RISK MEDICATION USE: ICD-10-CM

## 2021-07-09 DIAGNOSIS — L70.0 ACNE VULGARIS: Primary | ICD-10-CM

## 2021-07-09 PROCEDURE — 4004F PT TOBACCO SCREEN RCVD TLK: CPT | Performed by: DERMATOLOGY

## 2021-07-09 PROCEDURE — 99214 OFFICE O/P EST MOD 30 MIN: CPT | Performed by: DERMATOLOGY

## 2021-07-09 RX ORDER — ISOTRETINOIN 30 MG/1
30 CAPSULE, LIQUID FILLED ORAL 2 TIMES DAILY
Qty: 60 CAPSULE | Refills: 0 | Status: SHIPPED | OUTPATIENT
Start: 2021-07-09 | End: 2021-07-16

## 2021-07-09 NOTE — PROGRESS NOTES
Virtual Regular Visit      Assessment/Plan:    Problem List Items Addressed This Visit     None      Visit Diagnoses     Acne vulgaris    -  Primary    Relevant Medications    hydrocortisone 2 5 % ointment    Myorisan 30 MG capsule    High risk medication use        Relevant Medications    Myorisan 30 MG capsule    Xerosis of skin        Relevant Medications    hydrocortisone 2 5 % ointment    Myorisan 30 MG capsule    Medication monitoring encounter        Relevant Medications    Myorisan 30 MG capsule               Reason for visit is   Chief Complaint   Patient presents with    Virtual Regular Visit        Encounter provider Anastasia Wolff MD    Provider located at [address] [place] [address]  [phone]      Recent Visits  No visits were found meeting these conditions  Showing recent visits within past 7 days and meeting all other requirements  Today's Visits  Date Type Provider Dept   07/09/21 Telemedicine Anastasia Wolff MD [facility] [address] [place] [address] today's visits and meeting all other requirements  Future Appointments  No visits were found meeting these conditions  Showing future appointments within next 150 days and meeting all other requirements       The patient was identified by name and date of birth  Cornelio Oliveros was informed that this is a telemedicine visit and that the visit is being conducted through [facility] and patient was informed that this is a secure, HIPAA-compliant platform  She agrees to proceed     My exam room 6 door was closed  The patient was notified the following individuals were present in the room [address]  She acknowledged consent and understanding of privacy and security of the video platform  The patient has agreed to participate and understands they can discontinue the visit at any time  Patient is aware this is a billable service       Subjective  Cornelio Oliveros is a 24 y o  female Follow up acne, accutane   HPI     Past Medical History:   Diagnosis Date    Acne     ADHD (attention deficit hyperactivity disorder)     [facility]line [facility]     Depression     Fracture of carpal bones     Hypothyroidism     Hypothyroidism (acquired)     PTSD (post-traumatic stress disorder)     Suicide and self-inflicted injury ([facility] ) 60/1640    Suicide attempt ([facility] )        Past Surgical History:   Procedure Laterality Date    WISDOM TOOTH EXTRACTION         Current Outpatient Medications   Medication Sig Dispense Refill    albuterol (PROVENTIL HFA,VENTOLIN HFA) 90 mcg/act inhaler Inhale 2 puffs every 6 (six) hours as needed for wheezing or shortness of breath 1 Inhaler 5    doxepin (SINEquan) 10 mg capsule Take 1 capsule (10 mg total) by mouth daily at bedtime 30 capsule 1    FLUoxetine (PROzac) 20 mg capsule Take 1 capsule (20 mg total) by mouth daily 30 capsule 1    fluticasone (FLONASE) 50 mcg/act nasal spray 1 spray into each nostril daily 1 Bottle 2    hydrOXYzine HCL (ATARAX) 25 mg tablet Take 1 tablet (25 mg total) by mouth every 8 (eight) hours as needed for itching or anxiety 90 tablet 0    levonorgestrel (MIRENA) 20 MCG/24HR IUD 1 each by Intrauterine route once      levothyroxine 50 mcg tablet TAKE 1 TABLET BY MOUTH EVERY DAY 90 tablet 1    Myorisan 30 MG capsule Take 1 capsule (30 mg total) by mouth 2 (two) times a day Ipledge number: [id number] 60 capsule 0    clindamycin (CLEOCIN T) 1 % external solution Apply topically 2 (two) times a day (Patient not taking: Reported on 7/9/2021) 30 mL 2    hydrocortisone 2 5 % ointment Apply topically as needed 30 g 0    spironolactone (ALDACTONE) 25 mg tablet TAKE 1 TABLET BY MOUTH EVERY DAY (Patient not taking: Reported on 7/9/2021) 30 tablet 8     No current facility-administered medications for this visit          No Known Allergies    Review of Systems    Video Exam    There were no vitals filed for this visit     Physical Exam     I spent 25 minutes directly with the patient during this visit      VIRTUAL VISIT DISCLAIMER    Rahel Aquino acknowledges that she has consented to an online visit or consultation  She understands that the online visit is based solely on information provided by her, and that, in the absence of a face-to-face physical evaluation by the physician, the diagnosis she receives is both limited and provisional in terms of accuracy and completeness  This is not intended to replace a full medical face-to-face evaluation by the physician  Rahel Aquino understands and accepts these terms  ISOTRETINOIN "ACCUTANE"    Age: 24 y o  Weight (in KILOgrams): 63 5 kg  Pulse: Unable to obtain, virtual visit  Blood Pressure: Unable to obtain, virtual visit    Ipledge number: [id number]  Last 4 digits SS: [id number]    Contraception Type 1: implantable hormone  Contraception Type 2: male latex condom  Patient reminded that this must be listed in same order on iPledge   Yes       "Goal Dose" in mg (125-150 mg x weight in kg): 7937 5 mg     Interim month   "Daily Dose" of Isotretinoin the patient has actually been taking since last visit (this is usually what was prescribed): 60 mg   "Total # of Days" patient took Isotretinoin since last visit (this is usually 30):  30 days   "Total Monthly Dose" in mg since last visit (this equals "Daily Dose" x "Total # of Days"): 1,800 mg    Cumulative dosage   "Total cumulative Dose" in mg (add the above "Total Monthly Dose" with "Total Cumulative Dose" from last visit: 5,780 mg        Symptoms   Conjunctivitis: No   Night Blindness/ Issues with night vision: No   Focusing Problems: No   Dry Lips/Eyes: YES   Dry Skin: YES   Rash: No   Nose Bleeds: No, but very dry inside nose   Angular cheilitis (cracking in corner of lips): No   Headaches: No   Photosensitivity: No   Dry Anus: No   Depression: No   Mood Changes: No   Suicidal thoughts: No   Fatigue: No   Weight Loss: No   Muscle Pain/Stiffness: No   Abdominal pain: No   Diarrhea: No   Bloody stools: No    Physical Exam   Anatomic Location Affected:  face,   Morphological Description: open and closed comedones, erythematous papules,      Labs   Pregnancy test: urine pregnancy at home because of COVID 19  - Result: negative  - Interpretation- pregnant: No    No results found for: CHOL  Lab Results   Component Value Date    HDL 62 02/27/2020     Lab Results   Component Value Date    LDLCALC 65 02/27/2020     Lab Results   Component Value Date    TRIG 109 03/08/2021    TRIG 56 02/12/2021    TRIG 60 02/27/2020     No results found for: CHOLHDL    Lab Results   Component Value Date    ALT 25 03/08/2021    AST 16 03/08/2021    ALKPHOS 50 03/08/2021       Lab Results   Component Value Date    PREGSERUM Negative 03/08/2021       DIAGNOSES:     Acne Vulgaris   High Risk Medication - on Accutane therapy    Xerosis (see below for patient education)    Assessment and Plan:    Acne vulgaris, on isotretinoin    Planned daily dose for next 30 days: 60mg  - Confirmed patient status and counseling in iPLEDGE today  - Gentle skin care and regular SPF use  Stop all other acne treatments while on isotretinoin   -Recommend daily emollient for lips   - Side effects including but not limited to serious allergic reaction, thrombosis, mood changes, skeletal hyperostosis, premature epiphyseal closure, IBD, bone and muscle pain, HA, hyperlipidemia, photosensitivity, hepatotoxicity, dry skin and eyes, hair loss, teratogenicity (pregnancy category X) risk for up to 1 month after stopping medicine, 20-30% risk of recurrence were reviewed  - The patient understands not to donate blood, drink excessive amounts of alcohol, or share the medication with any individuals   Follow up in 1 month       Patients counseled:  - Cannot donate blood while taking isotretinoin and for 1 month after completing therapy   YES  - Do not share medication with anyone  YES  - Report any side effects of mood swings or depression and stop medication upon occurrence  YES  - Patient needs to confirm counseling in iPledge prior to picking up prescription  YES        Isotretinoin for Acne   Isotretinoin is a retinoid medication that is taken by mouth to treat severe nodular acne  Typically, it is used once other acne treatments have not worked, such as oral antibiotics  Usually isotretinoin is taken for 4 to 6 months, although the length of treatment can vary from person to person  While most patients acne improves and may even clear with this medication, in 20% of patients acne can come back  This requires additional acne treatment or even a second cycle of isotretinoin  How should I take isotretinoin?  Isotretinoin dosing is weight-based and should be taken exactly as prescribed   If you miss a dose, skip that dose  Do not take 2 doses at the same time   Take with fatty food to help with absorption   You will get a 30 day supply of isotretinoin at a time  It cannot be automatically refilled  Make certain that you have been given enough medication to last 30 days as pharmacists are unable to refill or make changes within a month   You must return to your dermatologist every month to make sure you are not having any serious side effects from isotretinoin  For female patients, this visit will always include a monthly pregnancy test  Other laboratory studies, including liver function tests, cholesterol and triglycerides, must also be conducted before and during treatment  What should I avoid while taking isotretinoin?  Do not get pregnant from one month prior or 1 month following taking any isotretinoin   Do not donate blood while take isotretinoin or until 1 months after coming off the medication      Do not have cosmetic procedures to smooth your skin, including waxing, dermabrasion, or laser procedures, while taking this medication and for at least 6 months after you stop   Do not share isotretinoin with any other people  It can cause birth defects and other serious health problems   Do not use any other acne medications, including medicated washes, cleansers, creams or antibiotic pills during your treatment with isotretinoin unless expressly directed to by your dermatologist      Initiating isotretinoin & the iPLEDGE Program    The iPLEDGE Program is a strict, government-required program to prevent females from becoming pregnant while on isotretinoin  All females and males must participate  Note: Your provider must follow this program and cannot change any of the requirements   If you fail to keep appointments, you will be unable to get your prescription filled  rBeanne Archuleta For females of childbearing age:      o Sachin Ramos must be on two specific forms of birth control   o You must answer a series of questions either online or by phone every month prior to getting the prescription  o Monthly prescriptions must be filled within 7 days of that month's pregnancy test   It is important that you notify your physician well before the 7th day if there are any unforeseen delays, such as prior authorizations  o For more information, visit: [email or web address]    What are the possible side effects of isotretinoin? What should I do? Most side effects from isotretinoin are mild and can be easily improved with simple remedies  Others are more concerning   Dry skin and eyes, chapped lips and dry nose that may lead to nosebleeds  o Dry Skin: Apply sensitive skin moisturizers to dry skin at least two times a day  You may need sunscreen (SPF 30) in the morning and to reapply when outside  o Dry Eyes: Use saline eye drops or artificial tears  o Dry Nose/Nosebleeds: Use saline nasal spray (ex  Ayr) during the day and at bedtime  To stop nosebleeds, hold pressure    If this does not work, call your dermatologist     o Chapped lips: apply petrolatum-based lip balms routinely  Avoid anything medicated   Contact your dermatologist for excessive dryness, cracks, tenderness or pain   Increased blood fats and cholesterol (usually in patients with a personal or family history of cholesterol or triglyceride problems)   Vision problems affecting your ability to see in the dark and drive at night   Bone, muscle and tendon aches can occur  Additional stretching before and after activities may help relieve aches  If you are otherwise healthy, consider the use of ibuprofen or naproxen  If you are unsure if you can use these pain medications, ask your doctor first  Also, call your doctor if you experience severe back pain, joint pain, or a broken bone    Changes in mood, including anxiety, depressive symptoms or suicidal thoughts which may or may not be temporary  Notify your doctor if your or a family member have suffered from these conditions or if you have any concerns during treatment   Serious birth defects or miscarriage can occur while taking this medication and for one month after taking your last dose of isotretinoin  You must not get pregnant while taking isotretinoin  Once the medication is out of your system, 30 days, there is no effect on the baby   Increased pressure in the brain  Call your doctor right away if you experience bad headache, blurred vision, dizziness, nausea or vomiting, or seizures   Skin rash - call your doctor right away if you develop any rashes or blisters on the skin   Liver damage - call your doctor right away if you experience severe stomach, chest or bowel pain, painful swallowing, diarrhea, blood in your stool, yellowing of your skin or eyes, or dark urine   Gastrointestinal bleeding  If you experience unusual abdominal pain or red or black/tarry stools, call your doctor immediately   Worsening acne   Mild worsening of acne can occur in the first few weeks of using isotreinoin  If your acne is getting significantly worse, call your doctor            Scribe Attestation    I,:  Debora Huizar MA am acting as a scribe while in the presence of the attending physician :       I,:  Monica Hoyos MD personally performed the services described in this documentation    as scribed in my presence :

## 2021-07-12 DIAGNOSIS — F41.1 GENERALIZED ANXIETY DISORDER: ICD-10-CM

## 2021-07-12 RX ORDER — DOXEPIN HYDROCHLORIDE 10 MG/1
10 CAPSULE ORAL
Qty: 30 CAPSULE | Refills: 1 | Status: SHIPPED | OUTPATIENT
Start: 2021-07-12 | End: 2021-07-26

## 2021-07-12 RX ORDER — FLUOXETINE HYDROCHLORIDE 20 MG/1
CAPSULE ORAL
Qty: 30 CAPSULE | Refills: 1 | Status: SHIPPED | OUTPATIENT
Start: 2021-07-12 | End: 2021-07-26

## 2021-07-12 RX ORDER — HYDROXYZINE HYDROCHLORIDE 25 MG/1
25 TABLET, FILM COATED ORAL EVERY 8 HOURS PRN
Qty: 90 TABLET | Refills: 0 | Status: SHIPPED | OUTPATIENT
Start: 2021-07-12 | End: 2021-07-26 | Stop reason: ALTCHOICE

## 2021-07-16 ENCOUNTER — TELEPHONE (OUTPATIENT)
Dept: DERMATOLOGY | Facility: CLINIC | Age: 21
End: 2021-07-16

## 2021-07-16 DIAGNOSIS — Z51.81 MEDICATION MONITORING ENCOUNTER: ICD-10-CM

## 2021-07-16 DIAGNOSIS — L70.0 ACNE VULGARIS: ICD-10-CM

## 2021-07-16 DIAGNOSIS — Z79.899 HIGH RISK MEDICATION USE: ICD-10-CM

## 2021-07-16 DIAGNOSIS — L85.3 XEROSIS OF SKIN: ICD-10-CM

## 2021-07-16 RX ORDER — ISOTRETINOIN 30 MG/1
CAPSULE ORAL
Qty: 60 CAPSULE | Refills: 0 | Status: SHIPPED | OUTPATIENT
Start: 2021-07-16 | End: 2021-08-18 | Stop reason: SDUPTHER

## 2021-07-16 NOTE — TELEPHONE ENCOUNTER
I spoke to [facility] pharmacy  Today is her last day to  accutane  Pharmacy is open till 930 PM  Patient is confirmed on ipledge  She needs to answer her comprehension ques and then she can go  her medicine  I left patient and left a detailed voicemail

## 2021-07-16 NOTE — TELEPHONE ENCOUNTER
[facility] pharmacy called stating that they are getting a rejection for the Clarivis and patient missed the 7 day window  They are asking  if it can be re-opened  They would like a call back  Patient seen on 7/9/21       # [phone]

## 2021-07-22 ENCOUNTER — TELEPHONE (OUTPATIENT)
Dept: DERMATOLOGY | Facility: CLINIC | Age: 21
End: 2021-07-22

## 2021-07-22 NOTE — TELEPHONE ENCOUNTER
Pt called in regards question about  her medication such as albuterol, and doxepin please give pt a call back

## 2021-07-22 NOTE — TELEPHONE ENCOUNTER
Returned call and LVM for Doris:  Dr Rosalinda Perea is covering at a facility; could forward information to him for review; nursing number given to call directly

## 2021-07-22 NOTE — TELEPHONE ENCOUNTER
Telephone call to patient to make sure she was able to  her isotretinoin  Left message for patient to call back if she was unable to pick medicine up

## 2021-07-23 NOTE — TELEPHONE ENCOUNTER
Follow up call made and was able to speak with Jeremy Ojeda  She said Adderall was removed from her medication list and now she's having trouble working  She reports having trouble concentrating, but feels like it's related to anxiety  She can't relax her body or mind  And she's having panic attacks  She can't stop picking at her hands and feet, they're "bloody "     Jeremy Ojeda said she needs something for anxiety, but not a histamine blocker  She said she OD'd on clonopin, but she "learned her lesson " She's not addicted, said she's learned from her mistakes and feels like she's "being punished "     I asked what the plan was if other medications had been discontinued  She was going to start therapy, but it's too expensive  She did ask to be scheduled with a therapist at [place] [address] - she doesn't think she will have a co-pay - and asked to make an appointment  For Dr Olivier Barksdale review and will forward to Intake to schedule

## 2021-07-26 ENCOUNTER — TELEPHONE (OUTPATIENT)
Dept: PSYCHIATRY | Facility: CLINIC | Age: 21
End: 2021-07-26

## 2021-07-26 ENCOUNTER — OFFICE VISIT (OUTPATIENT)
Dept: OBGYN CLINIC | Facility: CLINIC | Age: 21
End: 2021-07-26
Payer: COMMERCIAL

## 2021-07-26 VITALS
WEIGHT: 158 LBS | DIASTOLIC BLOOD PRESSURE: 76 MMHG | HEIGHT: 64 IN | BODY MASS INDEX: 26.98 KG/M2 | SYSTOLIC BLOOD PRESSURE: 114 MMHG

## 2021-07-26 DIAGNOSIS — R10.2 PELVIC CRAMPING: Primary | ICD-10-CM

## 2021-07-26 DIAGNOSIS — N93.0 PCB (POST COITAL BLEEDING): ICD-10-CM

## 2021-07-26 PROCEDURE — 3008F BODY MASS INDEX DOCD: CPT | Performed by: NURSE PRACTITIONER

## 2021-07-26 PROCEDURE — 99213 OFFICE O/P EST LOW 20 MIN: CPT | Performed by: NURSE PRACTITIONER

## 2021-07-26 NOTE — PROGRESS NOTES
Mundo Carolina 24year-old presents with complaint of pelvic cramping unrelated to her menses started 2 months ago  Occurs at random times  The cramping comes on intense and it last about 30-40 minutes  The cramping is sporadic  She moves her bowels about every other day  She does feel bloated  Cramps sometimes start when she is straining to move her bowels  She does not drink adequate amounts of water and does not eat a well balanced diet  Cramping also has occurred after intercourse  She also complains of random small gushes of postcoital bleeding, which resolves  Her cycles are irregular due to the Mirena IUD  She was seen in the ED in June for same complaint  A US of the  pelvis was normal  IUD centrally located  No LMP recorded  (Menstrual status: Birth Control)  ROS:  As indicated in HPI  All other ROS negative  Physical Exam  Constitutional:       Appearance: Normal appearance  Genitourinary:      Pelvic exam was performed with patient in the lithotomy position  Vulva, vagina, uterus, right adnexa and left adnexa normal       Cervix is nulliparous  No cervical motion tenderness, discharge, friability, lesion, erythema, bleeding, polyp, nabothian cyst, eversion or pinkness  IUD strings visualized  Uterus is anteverted and regular  HENT:      Head: Normocephalic and atraumatic  Musculoskeletal:      Cervical back: Neck supple  Neurological:      Mental Status: She is alert  Skin:     General: Skin is warm  Psychiatric:         Behavior: Behavior is cooperative  Vitals and nursing note reviewed  Mitchell Velarde was seen today for pelvic pain      Diagnoses and all orders for this visit:    Pelvic cramping    PCB (post coital bleeding)      I explained that I believe her cramping is more gastrointestinal in nature vs gynecological  Recommended she increase her hydration to at a minimum of 64 oz of water a day and increase her fiber intake with high fiber bars, cereals, fruits and vegetables  This will help her regulate her bowels and alleviate the cramps she is experiencing  As for the postcoital bleeding/spotting it could related to sexual positions and intensity  Advised changing positions  If bleeding is persistent and does not resolve she should let me know  She had a reassuring pelvic US in June and her pelvic exam is normal with no friability or lesions noted on the cervix  Follow-up as needed

## 2021-07-26 NOTE — TELEPHONE ENCOUNTER
7/26 @ 1:00  Patient l/m requesting to speak with provider about changing medication    She can be reached  at [phone]

## 2021-07-26 NOTE — TELEPHONE ENCOUNTER
I returned Doris's call today, 7/26/2021  Rose Hutchison voiced concern about ongoing anxiety characterized by "panic attacks", restlessness, anxious ruminations, and skin picking  She states that her productivity has declined as a result  Upon further discussion, Rose Hutchison states that she is not currently on anxiolytic therapy as she volitionally discontinued Prozac (which was started at intake session) secondary to vague adverse side effects  She did not contact the clinic at that time to discuss other options  Similarly to the conversation held with RN last week, Rose Hutchison requests initiation of treatment today with benzodiazepines  Given ongoing use of cannabis, historical use of hallucinogenic drugs, pathological personality traits, and extensive history of impulsive overdoses (OD on Klonopin in past), her request for benzodiazepines was denied  I spoke at length with Rose Hutchison about PRN Propranolol (used in past), further optimization of Atarax (to 50mg BID PRN), PRN Clonidine, or new trial with SSRI/SNRI to which she refused  She became tearful at this juncture  She ultimately decided she will "think about it" and contact the clinic in the future if she wishes to pursue further treatment  She did not offer any lethality concern during our conversation today  Will continue to monitor

## 2021-07-27 DIAGNOSIS — F41.1 GENERALIZED ANXIETY DISORDER: Primary | ICD-10-CM

## 2021-07-27 RX ORDER — DESVENLAFAXINE 50 MG/1
50 TABLET, EXTENDED RELEASE ORAL DAILY
Qty: 30 TABLET | Refills: 1 | Status: SHIPPED | OUTPATIENT
Start: 2021-07-27 | End: 2021-09-23

## 2021-07-27 RX ORDER — PROPRANOLOL HYDROCHLORIDE 20 MG/1
20 TABLET ORAL 2 TIMES DAILY PRN
Qty: 60 TABLET | Refills: 0 | Status: SHIPPED | OUTPATIENT
Start: 2021-07-27 | End: 2021-08-26

## 2021-07-27 NOTE — PROGRESS NOTES
After our conversation yesterday regarding her frustration surrounding this writer NOT providing benzodiazepines for ongoing anxiety, Marco call the clinic today requesting callback to discuss other medication options  I spoke at length with Marco today about her anxiety and FDA approved treatments  She referred to herself as a "guina pig" and voiced her frustration with numerous psychotropic trials in the past that have failed  After lengthy discussion, she finally recognized the need for baseline coverage of anxiety treatment rather than PRN use of medications  She has never trialled [facility] [employer]  As such, will start Pristiq 50mg Daily as this is both the starting dose and therapeutic dose and often has quicker onset of action  To address peak periods of anxiety, will initiate treatment with Propranolol 20mg BID PRN to target sympathetic arousal (tachycardia, palpitations, tremor, "shaky voice", etc)  At the conclusion of our assessment, Marco apologized for her behavior yesterday  She was pleased to report that she is seeking out therapy through this organization  She was informed that therapy will likely provide greater benefit than medication management, given extensive PTSD and personality pathology  Risks/benefits/alternativies to treatment discussed, including a myriad of potential adverse medication side effects, to which Marco voiced understanding and consented fully to treatment

## 2021-08-05 ENCOUNTER — TELEPHONE (OUTPATIENT)
Dept: PSYCHIATRY | Facility: CLINIC | Age: 21
End: 2021-08-05

## 2021-08-05 NOTE — TELEPHONE ENCOUNTER
Alfred Velasquez, please contact Armin Penny and encourage her to taper the PRN Propranolol dose to 10mg BID for breakthrough anxiety  "salmon time swallowing" is not a common side effect  If she reports symptoms suggestive of an allergic reaction (SOB, lip swelling, etc ) she should go to the ED  I do not suspect this is the case  Thanks

## 2021-08-05 NOTE — TELEPHONE ENCOUNTER
Ila Fabry called and LM on nursing line  She reported she started the Propanolol yesterday and she said "it feels like I am having a hard time swallowing  Like it it is uncomfortable"     She wants to know if this is a normal side effect or what she should do?      Please advise [phone]

## 2021-08-05 NOTE — TELEPHONE ENCOUNTER
LM for [facility]  Dr Sara Quiroz got the message and provided feedback for nursing to give her  Provided nursing number

## 2021-08-06 NOTE — TELEPHONE ENCOUNTER
Spoke with Paralee Bile  She denies any swelling or SOB  She said this symptom comes and goes  Advised of Dr Moise Alvarez recommendation of tapering the Propanolol down to 10 mg BID  She verbalized understanding

## 2021-08-09 ENCOUNTER — TELEPHONE (OUTPATIENT)
Dept: DERMATOLOGY | Facility: CLINIC | Age: 21
End: 2021-08-09

## 2021-08-10 ENCOUNTER — TELEMEDICINE (OUTPATIENT)
Dept: DERMATOLOGY | Facility: CLINIC | Age: 21
End: 2021-08-10
Payer: COMMERCIAL

## 2021-08-10 DIAGNOSIS — L70.0 ACNE VULGARIS: Primary | ICD-10-CM

## 2021-08-10 DIAGNOSIS — Z79.899 HIGH RISK MEDICATION USE: ICD-10-CM

## 2021-08-10 DIAGNOSIS — Z51.81 MEDICATION MONITORING ENCOUNTER: ICD-10-CM

## 2021-08-10 DIAGNOSIS — L85.3 XEROSIS OF SKIN: ICD-10-CM

## 2021-08-10 PROCEDURE — 99214 OFFICE O/P EST MOD 30 MIN: CPT | Performed by: DERMATOLOGY

## 2021-08-10 NOTE — PROGRESS NOTES
Virtual Regular Visit    Verification of patient location:    Patient is located in the following state in which I hold an active license [place]      Assessment/Plan:    Problem List Items Addressed This Visit     None      Visit Diagnoses     Acne vulgaris    -  Primary    High risk medication use        Xerosis of skin        Medication monitoring encounter                   Reason for visit is   Chief Complaint   Patient presents with    Virtual Regular Visit        Encounter provider Matheus Chavez    Provider located at [address]  [place] [address]  [phone]      Recent Visits  No visits were found meeting these conditions  Showing recent visits within past 7 days and meeting all other requirements  Today's Visits  Date Type Provider Dept   08/10/21 Telemedicine Matheus Chavez [facility] Zabrina Hensley   Showing today's visits and meeting all other requirements  Future Appointments  No visits were found meeting these conditions  Showing future appointments within next 150 days and meeting all other requirements       The patient was identified by name and date of birth  Triston Norton was informed that this is a telemedicine visit and that the visit is being conducted through [facility] and patient was informed that this is a secure, HIPAA-compliant platform  She agrees to proceed     My office door was closed  The patient was notified the following individuals were present in the room lizabeth meyers  She acknowledged consent and understanding of privacy and security of the video platform  The patient has agreed to participate and understands they can discontinue the visit at any time  Patient is aware this is a billable service  Subjective  Triston Norton is a 24 y o  female see derm note         HPI     Past Medical History:   Diagnosis Date    Acne     ADHD (attention deficit hyperactivity disorder)     Borderline personality disorder ([facility] )     Depression     Fracture of carpal bones     Hypothyroidism     Hypothyroidism (acquired)     PTSD (post-traumatic stress disorder)     Suicide and self-inflicted injury ([facility] ) [id number]    Suicide attempt ([place] [address] )        Past Surgical History:   Procedure Laterality Date    WISDOM TOOTH EXTRACTION         Current Outpatient Medications   Medication Sig Dispense Refill    albuterol (PROVENTIL HFA,VENTOLIN HFA) 90 mcg/act inhaler Inhale 2 puffs every 6 (six) hours as needed for wheezing or shortness of breath 1 Inhaler 5    desvenlafaxine succinate (PRISTIQ) 50 mg 24 hr tablet Take 1 tablet (50 mg total) by mouth daily 30 tablet 1    fluticasone (FLONASE) 50 mcg/act nasal spray 1 spray into each nostril daily 1 Bottle 2    hydrocortisone 2 5 % ointment Apply topically as needed 30 g 0    ISOtretinoin (ACCUTANE) 30 MG capsule TAKE 1 CAPSULE (30 MG TOTAL) BY MOUTH 2 (TWO) TIMES A DAY IPLEDGE NUMBER: [id number] 60 capsule 0    levonorgestrel (MIRENA) 20 MCG/24HR IUD 1 each by Intrauterine route once      levothyroxine 50 mcg tablet TAKE 1 TABLET BY MOUTH EVERY DAY 90 tablet 1    propranolol (INDERAL) 20 mg tablet Take 1 tablet (20 mg total) by mouth 2 (two) times a day as needed (Anxiety, Sympathetic arousal) 60 tablet 0     No current facility-administered medications for this visit  No Known Allergies    Review of Systems    Video Exam    There were no vitals filed for this visit  Physical Exam     I spent 10 minutes directly with the patient during this visit    VIRTUAL VISIT [address] [place] [address] verbally agrees to participate in [employer]  Pt is aware that [employer] could be limited without vital signs or the ability to perform a full hands-on physical exam  Doris Burrell understands she or the provider may request at any time to terminate the video visit and request the patient to seek care or treatment in person        [facility] Follow Up Note    Patient Name: Jose Wylie  Encounter Date: 08/10/2021    Today's Chief Concerns:  Jolanta Amayatanja Concern #1:  Follow up acne      Current Medications:    Current Outpatient Medications:     albuterol (PROVENTIL HFA,VENTOLIN HFA) 90 mcg/act inhaler, Inhale 2 puffs every 6 (six) hours as needed for wheezing or shortness of breath, Disp: 1 Inhaler, Rfl: 5    desvenlafaxine succinate (PRISTIQ) 50 mg 24 hr tablet, Take 1 tablet (50 mg total) by mouth daily, Disp: 30 tablet, Rfl: 1    fluticasone (FLONASE) 50 mcg/act nasal spray, 1 spray into each nostril daily, Disp: 1 Bottle, Rfl: 2    hydrocortisone 2 5 % ointment, Apply topically as needed, Disp: 30 g, Rfl: 0    ISOtretinoin (ACCUTANE) 30 MG capsule, TAKE 1 CAPSULE (30 MG TOTAL) BY MOUTH 2 (TWO) TIMES A DAY IPLEDGE NUMBER: [id number], Disp: 60 capsule, Rfl: 0    levonorgestrel (MIRENA) 20 MCG/24HR IUD, 1 each by Intrauterine route once, Disp: , Rfl:     levothyroxine 50 mcg tablet, TAKE 1 TABLET BY MOUTH EVERY DAY, Disp: 90 tablet, Rfl: 1    propranolol (INDERAL) 20 mg tablet, Take 1 tablet (20 mg total) by mouth 2 (two) times a day as needed (Anxiety, Sympathetic arousal), Disp: 60 tablet, Rfl: 0    CONSTITUTIONAL:   There were no vitals filed for this visit  Today's Height:   virtual visit   Today's Weight (in kilograms):   virtual visit  Today's Temperature:   Virtual visit    Specific Alerts:    Have you been seen by a [facility] in the last 3 years? No    Are you pregnant or planning to become pregnant? No    Are you currently or planning to be nursing or breast feeding? No    No Known Allergies    May we call your Preferred Phone number to discuss your specific medical information? YES    May we leave a detailed message that includes your specific medical information? YES    Have you traveled outside of the [facility] in the past 3 months?  No      Review of Systems:  Have you recently had or currently have any of the following? · Fever or chills: No  · Night Sweats: No  · Headaches: No  · Weight Gain: No  · Weight Loss: No  · Blurry Vision: No  · Nausea: No  · Vomiting: No  · Diarrhea: No  · Blood in Stool: No  · Abdominal Pain: No  · Itchy Skin: No  · Painful Joints: No  · Swollen Joints: No  · Muscle Pain: No  · Irregular Mole: No  · Sun Burn: No  · Dry Skin: YES  · Skin Color Changes: No  · Scar or Keloid: No  · Cold Sores/Fever Blisters: No  · Bacterial Infections/MRSA: No  · Anxiety: No  · Depression: No  · Suicidal or Homicidal Thoughts: No      PSYCH: Normal mood and affect  EYES: Normal conjunctiva  ENT: Normal lips and oral mucosa  CARDIOVASCULAR: No edema  RESPIRATORY: Normal respirations  HEME/LYMPH/IMMUNO:  No regional lymphadenopathy except as noted below in ASSESSMENT AND PLAN BY DIAGNOSIS    FULL ORGAN SYSTEM SKIN EXAM (SKIN)  Face Normal except as noted below in Assessment   Neck, Cervical Chain Nodes    Right Arm/Hand/Fingers    Left Arm/Hand/Fingers    Chest/Breasts/Axillae    Abdomen, Umbilicus    Back/Spine    Groin/Genitalia/Buttocks    Right Leg, Foot, Toes    Left Leg, Foot, Toes      ISOTRETINOIN "ACCUTANE": FEMALE    Age: 24 y o  Weight (in KILOgrams): 71 7 kg  Pulse: unable to obtain  Blood Pressure: unable to obtain    Ipledge number: [id number]  Last 4 digits SS: [id number]    Contraception Type 1: implantable hormone  Contraception Type 2: male latex condom  Patient reminded that this must be listed in same order on iPledge   Yes       "Goal Dose" in mg (125 mg x weight in kg): 7,937 5 mg    Interim month   "Daily Dose" of Isotretinoin the patient has actually been taking since last visit (this is usually what was prescribed): 60 mg   "Total # of Days" patient took Isotretinoin since last visit (this is usually 30):  30 days   "Total Monthly Dose" in mg since last visit (this equals "Daily Dose" x "Total # of Days"): 1800 mg    Cumulative dosage   "Total cumulative Dose" in mg (add the above "Total Monthly Dose" with "Total Cumulative Dose" from last visit: 7,580 mg        Symptoms   Conjunctivitis: No   Night Blindness/ Issues with night vision: No   Focusing Problems: No   Dry Lips/Eyes/nose: YES   Dry Skin: YES   Rash: No   Nose Bleeds: No   Angular cheilitis (cracking in corner of lips): No   Headaches: No   Photosensitivity: YES   Dry Anus: No   Depression: No   Mood Changes: No   Suicidal thoughts: No   Fatigue: No   Weight Loss: No   Muscle Pain/Stiffness: No   Abdominal pain: No   Diarrhea: No   Bloody stools: No    Joint pain: Yes (new since last visit)    Physical Exam     Anatomic Location Affected:  face    Morphological Description:    o Open/Closed Comedones:  - No evidence ("Clear")  o Inflammatory Papules/Pustules:  - Few ("Mild")  o Nodules:  - No evidence ("Clear")  o Scarring:  - Few ("Mild")  o Excoriations:  - No evidence ("Clear")  o Local Skin Redness/Erythema:  - No evidence ("Clear")  o Local Skin Dryness/Scaling:  - No evidence ("Clear")  o Local Skin Dyspigmentation:  - No evidence ("Clear")   Pertinent Positives:   Pertinent Negatives:    Labs     Pregnancy test: urine pregnancy at home because of COVID 19  - Result: negative  - Interpretation- pregnant: No      Additional History of Present Condition:  Patient states she has improvement     DIAGNOSES:     Acne Vulgaris   High Risk Medication   Medication Monitoring   Xerosis (see below for patient education)    Assessment and Plan:   Target Total Dose per KILOgram:  125 mg/KILOgram (this may change this on a patient-by-patient basis)   Planned daily dose for next 30 days: 60 mg for 6 days (patient then reaches goal dose)   (please remember to add patient's "Ipledge number" to actual prescription):     Return to clinic in about 1 month, please review ipledge guidelines in terms of timing (see below for patient education)   Get labs 1-2 days before next appointment: YES   Patient confirmed in iPledge: YES    Patients counseled:  - Cannot donate blood while taking isotretinoin and for 1 month after completing therapy  YES  - Do not share medication with anyone  YES  - Possible side effects discussed, including sun sensitivity, dry lips/eyes/skin, headaches, blurry vision (pseudotumor cerebri), muscle/joint aches, GI upset, bloody stools (IBD including Crohns/Ulcerative Colitis), jaundice, liver dysfunction, lipid abnormalities, bone marrow dysfunction, mood effects, thoughts of hurting oneself or others, and flaring of acne (acne fulminans/SAPPHO)  YES  - Females cannot get pregnant and must be on two forms of birth control while on therapy and at least one month after  YES  - Report any side effects of mood swings or depression and stop medication upon occurrence  YES  - Patient needs to confirm counseling in iPledge prior to picking up prescription  YES      Isotretinoin for Acne   Isotretinoin is a retinoid medication that is taken by mouth to treat severe nodular acne  Typically, it is used once other acne treatments have not worked, such as oral antibiotics  Usually isotretinoin is taken for 4 to 6 months, although the length of treatment can vary from person to person  While most patients acne improves and may even clear with this medication, in 20% of patients acne can come back  This requires additional acne treatment or even a second cycle of isotretinoin  How should I take isotretinoin?  Isotretinoin dosing is weight-based and should be taken exactly as prescribed   If you miss a dose, skip that dose  Do not take 2 doses at the same time   Take with food to help with absorption   All instructions in the iPLEDGE program packet (www [place]) that was provided must be followed (see below for highlights)   You will get a 30 day supply of isotretinoin at a time  It cannot be automatically refilled    Make certain that you have been given enough medication to last 30 days as pharmacists are unable to refill or make changes within a month   You must return to your dermatologist every month to make sure you are not having any serious side effects from isotretinoin  For female patients, this visit will always include a monthly pregnancy test  Other laboratory studies, including liver function tests, cholesterol and triglycerides, must also be conducted before and during treatment  What should I avoid while taking isotretinoin?  Do not get pregnant from one month prior or 1 month following taking any isotretinoin   Do not donate blood while take isotretinoin or until 1 months after coming off the medication   Do not have cosmetic procedures to smooth your skin, including waxing, dermabrasion, or laser procedures, while taking this medication and for at least 6 months after you stop   Do not share isotretinoin with any other people  It can cause birth defects and other serious health problems   Do not use any other acne medications, including medicated washes, cleansers, creams or antibiotic pills during your treatment with isotretinoin unless expressly directed to by your dermatologist      Initiating isotretinoin & the iPLEDGE Program    The iPLEDGE Program is a strict, government-required program to prevent females from becoming pregnant while on isotretinoin  All females and males must participate  Note: Your provider must follow this program and cannot change any of the requirements   Before starting isotretinoin, your provider will talk to you about the safe use of this medication and you will need to sign consent forms in order to receive treatment   If you fail to keep appointments, you will be unable to get your prescription filled  [facility] For females of childbearing age:      mitul Christensen must be on two specific forms of birth control before starting isotretinoin   Your provider must get 2 negative pregnancy tests, at least 30 days apart, before you can proceed with the medication  The second pregnancy test must be obtained within 5 days of the menstrual cycle  If you choose not to be sexually active during treatment, you still must have the 2 negative pregnancy tests    o You must answer a series of questions either online or by phone every month prior to getting the prescription  o Monthly prescriptions must be filled within 7 days of that month's pregnancy test   It is important that you notify your physician well before the 7th day if there are any unforeseen delays, such as prior authorizations  o For more information, visit: [email or web address] [facility]/    What are the possible side effects of isotretinoin? What should I do? Most side effects from isotretinoin are mild and can be easily improved with simple remedies  Others are more concerning   Dry skin and eyes, chapped lips and dry nose that may lead to nosebleeds  o Dry Skin: Apply sensitive skin moisturizers to dry skin at least two times a day  You may need sunscreen (SPF 30) in the morning and to reapply when outside  o Dry Eyes: Use saline eye drops or artificial tears  o Dry Nose/Nosebleeds: Use saline nasal spray (ex  Ayr) during the day and at bedtime  To stop nosebleeds, hold pressure  If this does not work, call your dermatologist     o Chapped lips: apply petrolatum-based lip balms routinely  Avoid anything medicated   Contact your dermatologist for excessive dryness, cracks, tenderness or pain   Increased blood fats and cholesterol (usually in patients with a personal or family history of cholesterol or triglyceride problems)   Vision problems affecting your ability to see in the dark and drive at night   Bone, muscle and tendon aches can occur  Additional stretching before and after activities may help relieve aches  If you are otherwise healthy, consider the use of ibuprofen or naproxen    If you are unsure if you can use these pain medications, ask your doctor first  Also, call your doctor if you experience severe back pain, joint pain, or a broken bone    Changes in mood, including anxiety, depressive symptoms or suicidal thoughts which may or may not be temporary  Notify your doctor if your or a family member have suffered from these conditions or if you have any concerns during treatment   Serious birth defects or miscarriage can occur while taking this medication and for one month after taking your last dose of isotretinoin  You must not get pregnant while taking isotretinoin  Once the medication is out of your system, 30 days, there is no effect on the baby   Increased pressure in the brain  Call your doctor right away if you experience bad headache, blurred vision, dizziness, nausea or vomiting, or seizures   Skin rash - call your doctor right away if you develop any rashes or blisters on the skin   Liver damage - call your doctor right away if you experience severe stomach, chest or bowel pain, painful swallowing, diarrhea, blood in your stool, yellowing of your skin or eyes, or dark urine   Gastrointestinal bleeding  If you experience unusual abdominal pain or red or black/tarry stools, call your doctor immediately  You should also notify your doctor if you or a family member has a history of ulcerative colitis or Crohns disease   Worsening acne  Mild worsening of acne can occur in the first few weeks of using isotreinoin  If your acne is getting significantly worse, call your doctor  This may require temporarily stopping the isotretinoin and possibly adding other medications  XEROSIS ("DRY SKIN")    Dry skin refers to skin that feels dry to touch  Dry skin has a dull surface with a rough, scaly quality  The skin is less pliable and cracked  When dryness is severe, the skin may become inflamed and fissured    Although any body site can be dry, dry skin tends to affect the shins more than any other site  Dry skin is lacking moisture in the outer horny cell layer (stratum corneum) and this results in cracks in the skin surface  Dry skin is also called xerosis, xeroderma or asteatosis (lack of fat)  It can affect males and females of all ages  There is some racial variability in water and lipid content of the skin   Dry skin that starts in early childhood may be one of about 20 types of ichthyosis (fish-scale skin)  There is often a family history of dry skin   Dry skin is commonly seen in people with atopic dermatitis   Nearly everyone > 60 years has dry skin  Dry skin that begins later may be seen in people with certain diseases and conditions   Postmenopausal women   Hypothyroidism   Chronic renal disease    Malnutrition and weight loss    Subclinical dermatitis    Treatment with certain drugs such as oral retinoids, diuretics and epidermal growth factor receptor inhibitors    People exposed to a dry environment may experience dry skin   Low humidity: in desert climates or cool, windy conditions    Excessive air conditioning    Direct heat from a fire or fan heater    Excessive bathing    Contact with soap, detergents and solvents    Inappropriate topical agents such as alcohol    Frictional irritation from rough clothing or abrasives    Dry skin is due to abnormalities in the integrity of the barrier function of the stratum corneum, which is made up of corneocytes   There is an overall reduction in the lipids in the stratum corneum   Ratio of ceramides, cholesterol and free fatty acids may be normal or altered   There may be a reduction in the proliferation of keratinocytes   Keratinocyte subtypes change in dry skin with a decrease in keratins K1, K10 and increase in K5, K14     Involucrin (a protein) may be expressed early, increasing cell stiffness   The result is retention of corneocytes and reduced water-holding capacity      What is the treatment for dry skin? The mainstay of treatment of dry skin and ichthyosis is moisturisers/emollients  They should be applied liberally and often enough to:   Reduce itch    Improve the barrier function    Prevent entry of irritants, bacteria    Reduce transepidermal water loss  When considering which emollient is most suitable, consider:   Severity of the dryness    Tolerance    Personal preference    Cost and availability  Emollients generally work best if applied to damp skin, if pH is below 7 (acidic), and if containing humectants such as urea or propylene glycol  Additional treatments include:   Topical steroid if itchy or there is dermatitis -- choose an emollient base    Topical calcineurin inhibitors if topical steroids are unsuitable  How can dry skin be prevented? Eliminate aggravating factors:   Reduce the frequency of bathing   A humidifier in winter and air conditioner in summer    Compare having a short shower with a prolonged soak in a bath   Use lukewarm, not hot, water   Replace standard soap with a substitute such as a synthetic detergent cleanser, water-miscible emollient, bath oil, anti-pruritic tar oil, colloidal oatmeal etc     Apply an emollient liberally and often, particularly shortly after bathing, and when itchy  The drier the skin, the thicker this should be, especially on the hands  What is the outlook for dry skin? A tendency to dry skin may persist life-long, or it may improve once contributing factors are controlled  Labs long overdue

## 2021-08-11 ENCOUNTER — APPOINTMENT (OUTPATIENT)
Dept: LAB | Facility: CLINIC | Age: 21
End: 2021-08-11
Payer: COMMERCIAL

## 2021-08-11 DIAGNOSIS — Z51.81 MEDICATION MONITORING ENCOUNTER: ICD-10-CM

## 2021-08-11 DIAGNOSIS — L70.0 ACNE VULGARIS: ICD-10-CM

## 2021-08-11 DIAGNOSIS — Z79.899 HIGH RISK MEDICATION USE: ICD-10-CM

## 2021-08-11 DIAGNOSIS — L85.3 XEROSIS OF SKIN: ICD-10-CM

## 2021-08-11 LAB
BASOPHILS # BLD AUTO: 0.02 THOUSANDS/ΜL (ref 0–0.1)
BASOPHILS NFR BLD AUTO: 0 % (ref 0–1)
EOSINOPHIL # BLD AUTO: 0.09 THOUSAND/ΜL (ref 0–0.61)
EOSINOPHIL NFR BLD AUTO: 1 % (ref 0–6)
ERYTHROCYTE [DISTWIDTH] IN BLOOD BY AUTOMATED COUNT: 12.5 % (ref 11.6–15.1)
GLUCOSE P FAST SERPL-MCNC: 96 MG/DL (ref 65–99)
HCT VFR BLD AUTO: 41.5 % (ref 34.8–46.1)
HGB BLD-MCNC: 13.8 G/DL (ref 11.5–15.4)
IMM GRANULOCYTES # BLD AUTO: 0.01 THOUSAND/UL (ref 0–0.2)
IMM GRANULOCYTES NFR BLD AUTO: 0 % (ref 0–2)
LYMPHOCYTES # BLD AUTO: 3.24 THOUSANDS/ΜL (ref 0.6–4.47)
LYMPHOCYTES NFR BLD AUTO: 47 % (ref 14–44)
MCH RBC QN AUTO: 30.3 PG (ref 26.8–34.3)
MCHC RBC AUTO-ENTMCNC: 33.3 G/DL (ref 31.4–37.4)
MCV RBC AUTO: 91 FL (ref 82–98)
MONOCYTES # BLD AUTO: 0.54 THOUSAND/ΜL (ref 0.17–1.22)
MONOCYTES NFR BLD AUTO: 8 % (ref 4–12)
NEUTROPHILS # BLD AUTO: 3.12 THOUSANDS/ΜL (ref 1.85–7.62)
NEUTS SEG NFR BLD AUTO: 44 % (ref 43–75)
NRBC BLD AUTO-RTO: 0 /100 WBCS
PLATELET # BLD AUTO: 322 THOUSANDS/UL (ref 149–390)
PMV BLD AUTO: 9.5 FL (ref 8.9–12.7)
RBC # BLD AUTO: 4.55 MILLION/UL (ref 3.81–5.12)
WBC # BLD AUTO: 7.02 THOUSAND/UL (ref 4.31–10.16)

## 2021-08-11 PROCEDURE — 82947 ASSAY GLUCOSE BLOOD QUANT: CPT

## 2021-08-11 PROCEDURE — 85025 COMPLETE CBC W/AUTO DIFF WBC: CPT

## 2021-08-11 PROCEDURE — 36415 COLL VENOUS BLD VENIPUNCTURE: CPT

## 2021-08-18 ENCOUNTER — TELEMEDICINE (OUTPATIENT)
Dept: MULTI SPECIALTY CLINIC | Facility: CLINIC | Age: 21
End: 2021-08-18

## 2021-08-18 ENCOUNTER — DOCUMENTATION (OUTPATIENT)
Dept: MULTI SPECIALTY CLINIC | Facility: CLINIC | Age: 21
End: 2021-08-18

## 2021-08-18 DIAGNOSIS — L70.0 ACNE VULGARIS: ICD-10-CM

## 2021-08-18 DIAGNOSIS — L70.0 ACNE VULGARIS: Primary | ICD-10-CM

## 2021-08-18 DIAGNOSIS — L85.3 XEROSIS OF SKIN: ICD-10-CM

## 2021-08-18 DIAGNOSIS — Z51.81 MEDICATION MONITORING ENCOUNTER: ICD-10-CM

## 2021-08-18 DIAGNOSIS — Z79.899 HIGH RISK MEDICATION USE: ICD-10-CM

## 2021-08-18 PROCEDURE — 99214 OFFICE O/P EST MOD 30 MIN: CPT | Performed by: DERMATOLOGY

## 2021-08-18 RX ORDER — ISOTRETINOIN 30 MG/1
30 CAPSULE ORAL 2 TIMES DAILY
Qty: 60 CAPSULE | Refills: 0 | Status: SHIPPED | OUTPATIENT
Start: 2021-08-18 | End: 2022-01-19

## 2021-08-18 NOTE — PROGRESS NOTES
30mg bid for 30 days  Confirmed in [facility]  Patient aware

## 2021-08-18 NOTE — PROGRESS NOTES
Pregnancy test results received via my chart    ISOTRETINOIN "ACCUTANE": FEMALE    Age: 24 y o  Weight (in KILOgrams): 71 7        Contraception Type 1: hormonal IUD  Contraception Type 2: male latex condom  Patient reminded that this must be listed in same order on iPledge  No      "Goal Dose" in mg (125 mg x weight in kg): 8962 5 mg    Interim month   "Daily Dose" of Isotretinoin the patient has actually been taking since last visit (this is usually what was prescribed): 60 mg   "Total # of Days" patient took Isotretinoin since last visit (this is usually 30):  30 days   "Total Monthly Dose" in mg since last visit (this equals "Daily Dose" x "Total # of Days"): 1800 mg    Cumulative dosage   "Total cumulative Dose" in mg (add the above "Total Monthly Dose" with "Total Cumulative Dose" from last visit: 7580 mg            Labs   Date of last labs: 6/4/21   Completed: No   Labs reviewed: within normal limits   Pregnancy test: urine pregnancy at home because of COVID 19  - Result: negative  - Interpretation- pregnant: No      Additional History of Present Condition:  no    DIAGNOSES:     Acne Vulgaris   High Risk Medication   Medication Monitoring   Xerosis (see below for patient education)    Assessment and Plan:   Target Total Dose per KILOgram:  125 mg/KILOgram (this may change this on a patient-by-patient basis)   Planned daily dose for next 30 days: 61      Return to clinic in about 1 month, please review ipledge guidelines in terms of timing (see below for patient education)   Get labs 1-2 days before next appointment: No   Patient confirmed in iPledge: No   Patients counseled:  - Cannot donate blood while taking isotretinoin and for 1 month after completing therapy  YES  - Do not share medication with anyone   YES  - Possible side effects discussed, including sun sensitivity, dry lips/eyes/skin, headaches, blurry vision (pseudotumor cerebri), muscle/joint aches, GI upset, bloody stools (IBD including Crohns/Ulcerative Colitis), jaundice, liver dysfunction, lipid abnormalities, bone marrow dysfunction, mood effects, thoughts of hurting oneself or others, and flaring of acne (acne fulminans/SAPPHO)  YES  - Females cannot get pregnant and must be on two forms of birth control while on therapy and at least one month after  YES  - Report any side effects of mood swings or depression and stop medication upon occurrence  YES  - Patient needs to confirm counseling in iPledge prior to picking up prescription  YES      Isotretinoin for Acne   Isotretinoin is a retinoid medication that is taken by mouth to treat severe nodular acne  Typically, it is used once other acne treatments have not worked, such as oral antibiotics  Usually isotretinoin is taken for 4 to 6 months, although the length of treatment can vary from person to person  While most patients acne improves and may even clear with this medication, in 20% of patients acne can come back  This requires additional acne treatment or even a second cycle of isotretinoin  How should I take isotretinoin?  Isotretinoin dosing is weight-based and should be taken exactly as prescribed   If you miss a dose, skip that dose  Do not take 2 doses at the same time   Take with food to help with absorption   All instructions in the iPLEDGE program packet (www [employer]) that was provided must be followed (see below for highlights)   You will get a 30 day supply of isotretinoin at a time  It cannot be automatically refilled  Make certain that you have been given enough medication to last 30 days as pharmacists are unable to refill or make changes within a month   You must return to your dermatologist every month to make sure you are not having any serious side effects from isotretinoin   For female patients, this visit will always include a monthly pregnancy test  Other laboratory studies, including liver function tests, cholesterol and triglycerides, must also be conducted before and during treatment  What should I avoid while taking isotretinoin?  Do not get pregnant from one month prior or 1 month following taking any isotretinoin   Do not donate blood while take isotretinoin or until 1 months after coming off the medication   Do not have cosmetic procedures to smooth your skin, including waxing, dermabrasion, or laser procedures, while taking this medication and for at least 6 months after you stop   Do not share isotretinoin with any other people  It can cause birth defects and other serious health problems   Do not use any other acne medications, including medicated washes, cleansers, creams or antibiotic pills during your treatment with isotretinoin unless expressly directed to by your dermatologist      Initiating isotretinoin & the iPLEDGE Program    The iPLEDGE Program is a strict, government-required program to prevent females from becoming pregnant while on isotretinoin  All females and males must participate  Note: Your provider must follow this program and cannot change any of the requirements   Before starting isotretinoin, your provider will talk to you about the safe use of this medication and you will need to sign consent forms in order to receive treatment   If you fail to keep appointments, you will be unable to get your prescription filled  [employer] For females of childbearing age:      o Delona Fees must be on two specific forms of birth control before starting isotretinoin  Your provider must get 2 negative pregnancy tests, at least 30 days apart, before you can proceed with the medication  The second pregnancy test must be obtained within 5 days of the menstrual cycle  If you choose not to be sexually active during treatment, you still must have the 2 negative pregnancy tests    o You must answer a series of questions either online or by phone every month prior to getting the prescription    o Monthly prescriptions must be filled within 7 days of that month's pregnancy test   It is important that you notify your physician well before the 7th day if there are any unforeseen delays, such as prior authorizations    o For more information, visit: [email or web address]

## 2021-08-18 NOTE — PROGRESS NOTES
Pregnancy test results received via my chart and are negative  It was my intent to perform this visit via video technology but the patient was not able to do a video connection so the visit was completed via audio telephone only  ISOTRETINOIN "ACCUTANE": FEMALE    Age: 24 y o  Weight (in KILOgrams): 71 7  Pulse: not obtainable  Blood Pressure: not obtainable        Contraception Type 1: hormonal IUD  Contraception Type 2: male latex condom  Patient reminded that this must be listed in same order on iPledge   No      "Goal Dose" in mg (125 mg x weight in kg): 7937 5 mg    Interim month   "Daily Dose" of Isotretinoin the patient has actually been taking since last visit (this is usually what was prescribed): 60 mg   "Total # of Days" patient took Isotretinoin since last visit (this is usually 30):  30 days   "Total Monthly Dose" in mg since last visit (this equals "Daily Dose" x "Total # of Days"): 1800 mg    Cumulative dosage   "Total cumulative Dose" in mg (add the above "Total Monthly Dose" with "Total Cumulative Dose" from last visit: 7580 mg        Labs   Date of last labs: 6/2021   Completed: YES   Labs reviewed: within normal limits   Pregnancy test: urine pregnancy at home because of COVID 19  - Result: negative  - Interpretation- pregnant: No      Additional History of Present Condition:  May be last month of treatment    DIAGNOSES:     Acne Vulgaris   High Risk Medication   Medication Monitoring   Xerosis (see below for patient education)    Assessment and Plan:   Target Total Dose per KILOgram:  125 mg/KILOgram (this may change this on a patient-by-patient basis)   Planned daily dose for next 30 days: 61   Return to clinic in about 1 month, please review ipledge guidelines in terms of timing (see below for patient education)   Get labs 1-2 days before next appointment: No   Patient confirmed in iPledge: YES   Patients counseled:  - Cannot donate blood while taking isotretinoin and for 1 month after completing therapy  YES  - Do not share medication with anyone  YES  - Females cannot get pregnant and must be on two forms of birth control while on therapy and at least one month after  YES  - Report any side effects of mood swings or depression and stop medication upon occurrence  YES  - Patient needs to confirm counseling in iPledge prior to picking up prescription  YES      Isotretinoin for Acne   Isotretinoin is a retinoid medication that is taken by mouth to treat severe nodular acne  Typically, it is used once other acne treatments have not worked, such as oral antibiotics  Usually isotretinoin is taken for 4 to 6 months, although the length of treatment can vary from person to person  While most patients acne improves and may even clear with this medication, in 20% of patients acne can come back  This requires additional acne treatment or even a second cycle of isotretinoin  How should I take isotretinoin?  Isotretinoin dosing is weight-based and should be taken exactly as prescribed   If you miss a dose, skip that dose  Do not take 2 doses at the same time   Take with food to help with absorption   All instructions in the iPLEDGE program packet (www [employer]) that was provided must be followed (see below for highlights)   You will get a 30 day supply of isotretinoin at a time  It cannot be automatically refilled  Make certain that you have been given enough medication to last 30 days as pharmacists are unable to refill or make changes within a month   You must return to your dermatologist every month to make sure you are not having any serious side effects from isotretinoin  For female patients, this visit will always include a monthly pregnancy test  Other laboratory studies, including liver function tests, cholesterol and triglycerides, must also be conducted before and during treatment  What should I avoid while taking isotretinoin?     Do not get pregnant from one month prior or 1 month following taking any isotretinoin   Do not donate blood while take isotretinoin or until 1 months after coming off the medication   Do not have cosmetic procedures to smooth your skin, including waxing, dermabrasion, or laser procedures, while taking this medication and for at least 6 months after you stop   Do not share isotretinoin with any other people  It can cause birth defects and other serious health problems   Do not use any other acne medications, including medicated washes, cleansers, creams or antibiotic pills during your treatment with isotretinoin unless expressly directed to by your dermatologist      Initiating isotretinoin & the iPLEDGE Program    The iPLEDGE Program is a strict, government-required program to prevent females from becoming pregnant while on isotretinoin  All females and males must participate  Note: Your provider must follow this program and cannot change any of the requirements   Before starting isotretinoin, your provider will talk to you about the safe use of this medication and you will need to sign consent forms in order to receive treatment   If you fail to keep appointments, you will be unable to get your prescription filled  [employer]i Pich[employer] For females of childbearing age:      o Narayan Patton must be on two specific forms of birth control before starting isotretinoin  Your provider must get 2 negative pregnancy tests, at least 30 days apart, before you can proceed with the medication  The second pregnancy test must be obtained within 5 days of the menstrual cycle  If you choose not to be sexually active during treatment, you still must have the 2 negative pregnancy tests    o You must answer a series of questions either online or by phone every month prior to getting the prescription    o Monthly prescriptions must be filled within 7 days of that month's pregnancy test   It is important that you notify your physician well before the 7th day if there are any unforeseen delays, such as prior authorizations  o For more information, visit: [email or web address]    What are the possible side effects of isotretinoin? What should I do? Most side effects from isotretinoin are mild and can be easily improved with simple remedies  Others are more concerning   Dry skin and eyes, chapped lips and dry nose that may lead to nosebleeds  o Dry Skin: Apply sensitive skin moisturizers to dry skin at least two times a day  You may need sunscreen (SPF 30) in the morning and to reapply when outside  o Dry Eyes: Use saline eye drops or artificial tears  o Dry Nose/Nosebleeds: Use saline nasal spray (ex  Ayr) during the day and at bedtime  To stop nosebleeds, hold pressure  If this does not work, call your dermatologist     o Chapped lips: apply petrolatum-based lip balms routinely  Avoid anything medicated   Contact your dermatologist for excessive dryness, cracks, tenderness or pain   Increased blood fats and cholesterol (usually in patients with a personal or family history of cholesterol or triglyceride problems)   Vision problems affecting your ability to see in the dark and drive at night   Bone, muscle and tendon aches can occur  Additional stretching before and after activities may help relieve aches  If you are otherwise healthy, consider the use of ibuprofen or naproxen  If you are unsure if you can use these pain medications, ask your doctor first  Also, call your doctor if you experience severe back pain, joint pain, or a broken bone    Changes in mood, including anxiety, depressive symptoms or suicidal thoughts which may or may not be temporary  Notify your doctor if your or a family member have suffered from these conditions or if you have any concerns during treatment      Serious birth defects or miscarriage can occur while taking this medication and for one month after taking your last dose of isotretinoin  You must not get pregnant while taking isotretinoin  Once the medication is out of your system, 30 days, there is no effect on the baby   Increased pressure in the brain  Call your doctor right away if you experience bad headache, blurred vision, dizziness, nausea or vomiting, or seizures   Skin rash - call your doctor right away if you develop any rashes or blisters on the skin   Liver damage - call your doctor right away if you experience severe stomach, chest or bowel pain, painful swallowing, diarrhea, blood in your stool, yellowing of your skin or eyes, or dark urine   Gastrointestinal bleeding  If you experience unusual abdominal pain or red or black/tarry stools, call your doctor immediately  You should also notify your doctor if you or a family member has a history of ulcerative colitis or Crohns disease   Worsening acne  Mild worsening of acne can occur in the first few weeks of using isotreinoin  If your acne is getting significantly worse, call your doctor  This may require temporarily stopping the isotretinoin and possibly adding other medications  XEROSIS ("DRY SKIN")    Dry skin refers to skin that feels dry to touch  Dry skin has a dull surface with a rough, scaly quality  The skin is less pliable and cracked  When dryness is severe, the skin may become inflamed and fissured  Although any body site can be dry, dry skin tends to affect the shins more than any other site  Dry skin is lacking moisture in the outer horny cell layer (stratum corneum) and this results in cracks in the skin surface  Dry skin is also called xerosis, xeroderma or asteatosis (lack of fat)  It can affect males and females of all ages  There is some racial variability in water and lipid content of the skin   Dry skin that starts in early childhood may be one of about 20 types of ichthyosis (fish-scale skin)  There is often a family history of dry skin      Dry skin is commonly seen in people with atopic dermatitis   Nearly everyone > 60 years has dry skin  Dry skin that begins later may be seen in people with certain diseases and conditions   Postmenopausal women   Hypothyroidism   Chronic renal disease    Malnutrition and weight loss    Subclinical dermatitis    Treatment with certain drugs such as oral retinoids, diuretics and epidermal growth factor receptor inhibitors    People exposed to a dry environment may experience dry skin   Low humidity: in desert climates or cool, windy conditions    Excessive air conditioning    Direct heat from a fire or fan heater    Excessive bathing    Contact with soap, detergents and solvents    Inappropriate topical agents such as alcohol    Frictional irritation from rough clothing or abrasives    Dry skin is due to abnormalities in the integrity of the barrier function of the stratum corneum, which is made up of corneocytes   There is an overall reduction in the lipids in the stratum corneum   Ratio of ceramides, cholesterol and free fatty acids may be normal or altered   There may be a reduction in the proliferation of keratinocytes   Keratinocyte subtypes change in dry skin with a decrease in keratins K1, K10 and increase in K5, K14     Involucrin (a protein) may be expressed early, increasing cell stiffness   The result is retention of corneocytes and reduced water-holding capacity  What is the treatment for dry skin? The mainstay of treatment of dry skin and ichthyosis is moisturisers/emollients  They should be applied liberally and often enough to:   Reduce itch    Improve the barrier function    Prevent entry of irritants, bacteria    Reduce transepidermal water loss  When considering which emollient is most suitable, consider:   Severity of the dryness    Tolerance    Personal preference    Cost and availability      Emollients generally work best if applied to damp skin, if pH is below 7 (acidic), and if containing humectants such as urea or propylene glycol  Additional treatments include:   Topical steroid if itchy or there is dermatitis -- choose an emollient base    Topical calcineurin inhibitors if topical steroids are unsuitable  How can dry skin be prevented? Eliminate aggravating factors:   Reduce the frequency of bathing   A humidifier in winter and air conditioner in summer    Compare having a short shower with a prolonged soak in a bath   Use lukewarm, not hot, water   Replace standard soap with a substitute such as a synthetic detergent cleanser, water-miscible emollient, bath oil, anti-pruritic tar oil, colloidal oatmeal etc     Apply an emollient liberally and often, particularly shortly after bathing, and when itchy  The drier the skin, the thicker this should be, especially on the hands  What is the outlook for dry skin? A tendency to dry skin may persist life-long, or it may improve once contributing factors are controlled

## 2021-08-28 DIAGNOSIS — J45.20 MILD INTERMITTENT ASTHMA WITHOUT COMPLICATION: ICD-10-CM

## 2021-08-30 RX ORDER — ALBUTEROL SULFATE 90 UG/1
AEROSOL, METERED RESPIRATORY (INHALATION)
Qty: 6.7 G | Refills: 5 | Status: SHIPPED | OUTPATIENT
Start: 2021-08-30

## 2021-09-14 ENCOUNTER — TELEMEDICINE (OUTPATIENT)
Dept: DERMATOLOGY | Age: 21
End: 2021-09-14
Payer: COMMERCIAL

## 2021-09-14 DIAGNOSIS — Z79.899 HIGH RISK MEDICATION USE: ICD-10-CM

## 2021-09-14 DIAGNOSIS — L70.0 ACNE VULGARIS: ICD-10-CM

## 2021-09-14 DIAGNOSIS — L30.9 ECZEMA, UNSPECIFIED TYPE: ICD-10-CM

## 2021-09-14 DIAGNOSIS — Z51.81 MEDICATION MONITORING ENCOUNTER: ICD-10-CM

## 2021-09-14 DIAGNOSIS — L01.00 IMPETIGO: ICD-10-CM

## 2021-09-14 DIAGNOSIS — L85.3 XEROSIS OF SKIN: Primary | ICD-10-CM

## 2021-09-14 DIAGNOSIS — E03.9 PRIMARY HYPOTHYROIDISM: ICD-10-CM

## 2021-09-14 PROCEDURE — 99214 OFFICE O/P EST MOD 30 MIN: CPT | Performed by: DERMATOLOGY

## 2021-09-14 RX ORDER — CEPHALEXIN 500 MG/1
500 CAPSULE ORAL 2 TIMES DAILY
Qty: 14 CAPSULE | Refills: 0 | Status: SHIPPED | OUTPATIENT
Start: 2021-09-14 | End: 2021-09-21

## 2021-09-14 RX ORDER — LEVOTHYROXINE SODIUM 0.05 MG/1
50 TABLET ORAL DAILY
Qty: 90 TABLET | Refills: 1 | Status: SHIPPED | OUTPATIENT
Start: 2021-09-14 | End: 2022-03-18 | Stop reason: SDUPTHER

## 2021-09-14 NOTE — PROGRESS NOTES
[facility] Follow Up Note    Patient Name: Jose Wylie  Encounter Date: 9 14 21    Today's Chief Concerns:   Concern #1:  Accutane Follow up   Concern #2:     Concern #3:      Current Medications:    Current Outpatient Medications:     albuterol (PROVENTIL HFA,VENTOLIN HFA) 90 mcg/act inhaler, TAKE 2 PUFFS BY MOUTH EVERY 6 HOURS AS NEEDED FOR WHEEZE OR FOR SHORTNESS OF BREATH, Disp: 6 7 g, Rfl: 5    desvenlafaxine succinate (PRISTIQ) 50 mg 24 hr tablet, Take 1 tablet (50 mg total) by mouth daily, Disp: 30 tablet, Rfl: 1    fluticasone (FLONASE) 50 mcg/act nasal spray, 1 spray into each nostril daily, Disp: 1 Bottle, Rfl: 2    levonorgestrel (MIRENA) 20 MCG/24HR IUD, 1 each by Intrauterine route once, Disp: , Rfl:     levothyroxine 50 mcg tablet, Take 1 tablet (50 mcg total) by mouth daily, Disp: 90 tablet, Rfl: 1    propranolol (INDERAL) 20 mg tablet, TAKE 1 TABLET (20 MG TOTAL) BY MOUTH 2 (TWO) TIMES A DAY AS NEEDED (ANXIETY, SYMPATHETIC AROUSAL), Disp: 60 tablet, Rfl: 0    hydrocortisone 2 5 % ointment, Apply topically as needed, Disp: 30 g, Rfl: 0    ISOtretinoin (ACCUTANE) 30 MG capsule, Take 1 capsule (30 mg total) by mouth 2 (two) times a day, Disp: 60 capsule, Rfl: 0    CONSTITUTIONAL:   Vitals:           Specific Alerts:    Have you been seen by a [facility] in the last 3 years? YES    Are you pregnant or planning to become pregnant? No    Are you currently or planning to be nursing or breast feeding? No    No Known Allergies    May we call your Preferred Phone number to discuss your specific medical information? YES    May we leave a detailed message that includes your specific medical information? YES    Have you traveled outside of the [facility] in the past 3 months? No    Do you currently have a pacemaker or defibrillator?  No    Do you have any artificial heart valves, joints, plates, screws, rods, stents, pins, etc? No   - If Yes, were any placed within the last 2 years? Do you require any medications prior to a surgical procedure? No   - If Yes, for which procedure? - If Yes, what medications to you require? Are you taking any medications that cause you to bleed more easily ("blood thinners") No    Have you ever experienced a rapid heartbeat with epinephrine? No    Have you ever been treated with "gold" (gold sodium thiomalate) therapy? [facility] can help with wrinkles, "laugh lines," facial volume loss, "double chin," "love handles," age spots, and more  Are you interested in learning today about some of the skin enhancement procedures that we offer? (If Yes, please provide more detail)     Review of Systems:  Have you recently had or currently have any of the following?     · Fever or chills: No  · Night Sweats: No  · Headaches: No  · Weight Gain: YES  · Weight Loss: No  · Blurry Vision: No  · Nausea: No  · Vomiting: No  · Diarrhea: No  · Blood in Stool: No  · Abdominal Pain: No  · Itchy Skin: No  · Painful Joints: YES  · Swollen Joints: No  · Muscle Pain: No  · Irregular Mole: No  · Sun Burn: No  · Dry Skin: No  · Skin Color Changes: No  · Scar or Keloid: No  · Cold Sores/Fever Blisters: No  · Bacterial Infections/MRSA: No  · Anxiety: No  · Depression: No  · Suicidal or Homicidal Thoughts: No    PSYCH: Normal mood and affect  EYES: Normal conjunctiva  ENT: Normal lips and oral mucosa  CARDIOVASCULAR: No edema  RESPIRATORY: Normal respirations  HEME/LYMPH/IMMUNO:  No regional lymphadenopathy except as noted below in ASSESSMENT AND PLAN BY DIAGNOSIS    FULL ORGAN SYSTEM SKIN EXAM (SKIN)   Hair, Scalp, Ears, Face Normal except as noted below in Assessment   Neck, Cervical Chain Nodes Normal except as noted below in Assessment   Right Arm/Hand/Fingers    Left Arm/Hand/Fingers    Chest/Breasts/Axillae    Abdomen, Umbilicus    Back/Spine    Groin/Genitalia/Buttocks    Right Leg, Foot, Toes        ISOTRETINOIN "ACCUTANE": FEMALE    Age: 24 y o  Weight (in KILOgrams): 71 1 kg  Pulse: unable to obtain virtual visit  Blood Pressure: unable to obtain virtual visit    Ipledge number: [id number]  Last 4 digits SS: [id number]    Contraception Type 1: implantable hormone  Contraception Type 2: male latex condom  Patient reminded that this must be listed in same order on iPledge  Yes       "Goal Dose" in mg (125 mg x weight in kg): 7,937 5 mg    Interim month   "Daily Dose" of Isotretinoin the patient has actually been taking since last visit (this is usually what was prescribed): 60 mg   "Total # of Days" patient took Isotretinoin since last visit (this is usually 30):  30 days   "Total Monthly Dose" in mg since last visit (this equals "Daily Dose" x "Total # of Days"): 1800 mg    Cumulative dosage   "Total cumulative Dose" in mg (add the above "Total Monthly Dose" with "Total Cumulative Dose" from last visit: 9,380 mg        Symptoms   Conjunctivitis: No   Night Blindness/ Issues with night vision: No   Focusing Problems: No   Dry Lips/Eyes: YES   Dry Skin: YES   Rash: No   Nose Bleeds: YES, here and there   Angular cheilitis (cracking in corner of lips):  YES   Headaches: No   Photosensitivity: No   Dry Anus: No   Depression: No   Mood Changes: No   Suicidal thoughts: No   Fatigue: No   Weight Loss: No   Muscle Pain/Stiffness: No   Abdominal pain: No   Diarrhea: No   Bloody stools: No    Joint pains: Yes    Physical Exam   Psychiatric/Mood: normal   Anatomic Location Affected:  face   Morphological Description:  o Open/Closed Comedones:  - No evidence ("Clear")  o Inflammatory Papules/Pustules:  - No evidence ("Clear")  o Nodules:  - No evidence ("Clear")  o Scarring:  - Few ("Mild")  o Excoriations:  - No evidence ("Clear")  o Local Skin Redness/Erythema:  - No evidence ("Clear")  o Local Skin Dryness/Scaling:  - No evidence ("Clear")  o Local Skin Dyspigmentation:  - No evidence ("Clear")   Pertinent Positives:   Pertinent Negatives:    Labs   Pregnancy test: urine pregnancy at home because of COVID 19  - Result: PND  patient will sent results over My Chart  Per Dr Jose Rivero  - Interpretation-PND      Additional History of Present Condition:  Patient stated she is relatively clear, still having some break outs of pimples  DIAGNOSES:     Acne Vulgaris   High Risk Medication   Medication Monitoring   Xerosis (see below for patient education)   Impetigo   Eczema    Assessment and Plan:   Target Total Dose per KILOgram:  125 mg/KILOgram (this may change this on a patient-by-patient basis)   Planned daily dose for next 30 days: therapy completed   (please remember to add patient's "Ipledge number" to actual prescription): Therapy completed   Return to clinic in about 1 month, please review ipledge guidelines in terms of timing (see below for patient education)   Get labs 1-2 days before next appointment: YES in 1 month   Patient confirmed in iPledge: No, completed therapy   Patients counseled:  - Cannot donate blood while taking isotretinoin and for 1 month after completing therapy  YES  - Do not share medication with anyone  YES  - Possible side effects discussed, including sun sensitivity, dry lips/eyes/skin, headaches, blurry vision (pseudotumor cerebri), muscle/joint aches, GI upset, bloody stools (IBD including Crohns/Ulcerative Colitis), jaundice, liver dysfunction, lipid abnormalities, bone marrow dysfunction, mood effects, thoughts of hurting oneself or others, and flaring of acne (acne fulminans/SAPPHO)  YES  - Females cannot get pregnant and must be on two forms of birth control while on therapy and at least one month after  YES  - Report any side effects of mood swings or depression and stop medication upon occurrence  YES  - Patient needs to confirm counseling in iPledge prior to picking up prescription   YES      Isotretinoin for Acne   Isotretinoin is a retinoid medication that is taken by mouth to treat severe nodular acne  Typically, it is used once other acne treatments have not worked, such as oral antibiotics  Usually isotretinoin is taken for 4 to 6 months, although the length of treatment can vary from person to person  While most patients acne improves and may even clear with this medication, in 20% of patients acne can come back  This requires additional acne treatment or even a second cycle of isotretinoin  How should I take isotretinoin?  Isotretinoin dosing is weight-based and should be taken exactly as prescribed   If you miss a dose, skip that dose  Do not take 2 doses at the same time   Take with food to help with absorption   All instructions in the iPLEDGE program packet (www [employer]) that was provided must be followed (see below for highlights)   You will get a 30 day supply of isotretinoin at a time  It cannot be automatically refilled  Make certain that you have been given enough medication to last 30 days as pharmacists are unable to refill or make changes within a month   You must return to your dermatologist every month to make sure you are not having any serious side effects from isotretinoin  For female patients, this visit will always include a monthly pregnancy test  Other laboratory studies, including liver function tests, cholesterol and triglycerides, must also be conducted before and during treatment  What should I avoid while taking isotretinoin?  Do not get pregnant from one month prior or 1 month following taking any isotretinoin   Do not donate blood while take isotretinoin or until 1 months after coming off the medication   Do not have cosmetic procedures to smooth your skin, including waxing, dermabrasion, or laser procedures, while taking this medication and for at least 6 months after you stop   Do not share isotretinoin with any other people   It can cause birth defects and other serious health problems   Do not use any other acne medications, including medicated washes, cleansers, creams or antibiotic pills during your treatment with isotretinoin unless expressly directed to by your dermatologist      Initiating isotretinoin & the iPLEDGE Program    The iPLEDGE Program is a strict, government-required program to prevent females from becoming pregnant while on isotretinoin  All females and males must participate  Note: Your provider must follow this program and cannot change any of the requirements   Before starting isotretinoin, your provider will talk to you about the safe use of this medication and you will need to sign consent forms in order to receive treatment   If you fail to keep appointments, you will be unable to get your prescription filled  Martha Long For females of childbearing age:      o [place] must be on two specific forms of birth control before starting isotretinoin  Your provider must get 2 negative pregnancy tests, at least 30 days apart, before you can proceed with the medication  The second pregnancy test must be obtained within 5 days of the menstrual cycle  If you choose not to be sexually active during treatment, you still must have the 2 negative pregnancy tests    o You must answer a series of questions either online or by phone every month prior to getting the prescription  o Monthly prescriptions must be filled within 7 days of that month's pregnancy test   It is important that you notify your physician well before the 7th day if there are any unforeseen delays, such as prior authorizations  o For more information, visit: [email or web address]    What are the possible side effects of isotretinoin? What should I do? Most side effects from isotretinoin are mild and can be easily improved with simple remedies  Others are more concerning   Dry skin and eyes, chapped lips and dry nose that may lead to nosebleeds      o Dry Skin: Apply sensitive skin moisturizers to dry skin at least two times a day  You may need sunscreen (SPF 30) in the morning and to reapply when outside  o Dry Eyes: Use saline eye drops or artificial tears  o Dry Nose/Nosebleeds: Use saline nasal spray (ex  Ayr) during the day and at bedtime  To stop nosebleeds, hold pressure  If this does not work, call your dermatologist     o Chapped lips: apply petrolatum-based lip balms routinely  Avoid anything medicated   Contact your dermatologist for excessive dryness, cracks, tenderness or pain   Increased blood fats and cholesterol (usually in patients with a personal or family history of cholesterol or triglyceride problems)   Vision problems affecting your ability to see in the dark and drive at night   Bone, muscle and tendon aches can occur  Additional stretching before and after activities may help relieve aches  If you are otherwise healthy, consider the use of ibuprofen or naproxen  If you are unsure if you can use these pain medications, ask your doctor first  Also, call your doctor if you experience severe back pain, joint pain, or a broken bone    Changes in mood, including anxiety, depressive symptoms or suicidal thoughts which may or may not be temporary  Notify your doctor if your or a family member have suffered from these conditions or if you have any concerns during treatment   Serious birth defects or miscarriage can occur while taking this medication and for one month after taking your last dose of isotretinoin  You must not get pregnant while taking isotretinoin  Once the medication is out of your system, 30 days, there is no effect on the baby   Increased pressure in the brain  Call your doctor right away if you experience bad headache, blurred vision, dizziness, nausea or vomiting, or seizures   Skin rash - call your doctor right away if you develop any rashes or blisters on the skin      Liver damage - call your doctor right away if you experience severe stomach, chest or bowel pain, painful swallowing, diarrhea, blood in your stool, yellowing of your skin or eyes, or dark urine   Gastrointestinal bleeding  If you experience unusual abdominal pain or red or black/tarry stools, call your doctor immediately  You should also notify your doctor if you or a family member has a history of ulcerative colitis or Crohns disease   Worsening acne  Mild worsening of acne can occur in the first few weeks of using isotreinoin  If your acne is getting significantly worse, call your doctor  This may require temporarily stopping the isotretinoin and possibly adding other medications  XEROSIS ("DRY SKIN")    Dry skin refers to skin that feels dry to touch  Dry skin has a dull surface with a rough, scaly quality  The skin is less pliable and cracked  When dryness is severe, the skin may become inflamed and fissured  Although any body site can be dry, dry skin tends to affect the shins more than any other site  Dry skin is lacking moisture in the outer horny cell layer (stratum corneum) and this results in cracks in the skin surface  Dry skin is also called xerosis, xeroderma or asteatosis (lack of fat)  It can affect males and females of all ages  There is some racial variability in water and lipid content of the skin   Dry skin that starts in early childhood may be one of about 20 types of ichthyosis (fish-scale skin)  There is often a family history of dry skin   Dry skin is commonly seen in people with atopic dermatitis   Nearly everyone > 60 years has dry skin  Dry skin that begins later may be seen in people with certain diseases and conditions     Postmenopausal women   Hypothyroidism   Chronic renal disease    Malnutrition and weight loss    Subclinical dermatitis    Treatment with certain drugs such as oral retinoids, diuretics and epidermal growth factor receptor inhibitors    People exposed to a dry environment may experience dry skin   Low humidity: in desert climates or cool, windy conditions    Excessive air conditioning    Direct heat from a fire or fan heater    Excessive bathing    Contact with soap, detergents and solvents    Inappropriate topical agents such as alcohol    Frictional irritation from rough clothing or abrasives    Dry skin is due to abnormalities in the integrity of the barrier function of the stratum corneum, which is made up of corneocytes   There is an overall reduction in the lipids in the stratum corneum   Ratio of ceramides, cholesterol and free fatty acids may be normal or altered   There may be a reduction in the proliferation of keratinocytes   Keratinocyte subtypes change in dry skin with a decrease in keratins K1, K10 and increase in K5, K14     Involucrin (a protein) may be expressed early, increasing cell stiffness   The result is retention of corneocytes and reduced water-holding capacity  What is the treatment for dry skin? The mainstay of treatment of dry skin and ichthyosis is moisturisers/emollients  They should be applied liberally and often enough to:   Reduce itch    Improve the barrier function    Prevent entry of irritants, bacteria    Reduce transepidermal water loss  When considering which emollient is most suitable, consider:   Severity of the dryness    Tolerance    Personal preference    Cost and availability  Emollients generally work best if applied to damp skin, if pH is below 7 (acidic), and if containing humectants such as urea or propylene glycol  Additional treatments include:   Topical steroid if itchy or there is dermatitis -- choose an emollient base    Topical calcineurin inhibitors if topical steroids are unsuitable  How can dry skin be prevented? Eliminate aggravating factors:   Reduce the frequency of bathing      A humidifier in winter and air conditioner in summer    Compare having a short shower with a prolonged soak in a bath   Use lukewarm, not hot, water   Replace standard soap with a substitute such as a synthetic detergent cleanser, water-miscible emollient, bath oil, anti-pruritic tar oil, colloidal oatmeal etc     Apply an emollient liberally and often, particularly shortly after bathing, and when itchy  The drier the skin, the thicker this should be, especially on the hands  What is the outlook for dry skin? A tendency to dry skin may persist life-long, or it may improve once contributing factors are controlled  Recommend treating acne scarring after 6 months  Finish all the medication for Accutane  Do Labs including HCG in 1 month  Recommend following up in 3 months  Take cephalexin 500 mg 1 tablet by mouth 2 times a day for 7 days  Hydrocortisone 2 5% cream apply to affected areas 2 times a day for 2 weeks  Scribe Attestation    I,:  Tasia Ely am acting as a scribe while in the presence of the attending physician :       I,:  Felisha Julien MD personally performed the services described in this documentation    as scribed in my presence :         Virtual Regular Visit    Verification of patient location:    Patient is located in the following state in which I hold an active license [place]      Assessment/Plan:    Problem List Items Addressed This Visit        Musculoskeletal and Integument    Acne vulgaris      Other Visit Diagnoses     Xerosis of skin    -  Primary    Medication monitoring encounter        High risk medication use                   Reason for visit is   Chief Complaint   Patient presents with    Virtual Regular Visit        Encounter provider Hima Espinosa MD    Provider located at [address] [place], [place] [id number]      Recent Visits  No visits were found meeting these conditions    Showing recent visits within past 7 days and meeting all other requirements  Today's Visits  Date Type Provider Dept   09/14/21 Telemedicine James Hunter MD [facility]   Showing today's visits and meeting all other requirements  Future Appointments  No visits were found meeting these conditions  Showing future appointments within next 150 days and meeting all other requirements       The patient was identified by name and date of birth  John Hess was informed that this is a telemedicine visit and that the visit is being conducted through [address] [place] [address] Road [employer] and patient was informed that this is a secure, HIPAA-compliant platform  She agrees to proceed     My office door was closed  The patient was notified the following individuals were present in the room filiberto guzman  She acknowledged consent and understanding of privacy and security of the video platform  The patient has agreed to participate and understands they can discontinue the visit at any time  Patient is aware this is a billable service  Subjective  John Hess is a 24 y o  female see above         HPI     Past Medical History:   Diagnosis Date    Acne     ADHD (attention deficit hyperactivity disorder)     Borderline personality disorder ([facility] [address] )     Depression     Fracture of carpal bones     Hypothyroidism     Hypothyroidism (acquired)     PTSD (post-traumatic stress disorder)     Suicide and self-inflicted injury ([facility] [address] ) [id number]    Suicide attempt ([facility] [address] )        Past Surgical History:   Procedure Laterality Date    WISDOM TOOTH EXTRACTION         Current Outpatient Medications   Medication Sig Dispense Refill    albuterol (PROVENTIL HFA,VENTOLIN HFA) 90 mcg/act inhaler TAKE 2 PUFFS BY MOUTH EVERY 6 HOURS AS NEEDED FOR WHEEZE OR FOR SHORTNESS OF BREATH 6 7 g 5    desvenlafaxine succinate (PRISTIQ) 50 mg 24 hr tablet Take 1 tablet (50 mg total) by mouth daily 30 tablet 1    fluticasone (FLONASE) 50 mcg/act nasal spray 1 spray into each nostril daily 1 Bottle 2    levonorgestrel (MIRENA) 20 MCG/24HR IUD 1 each by Intrauterine route once      levothyroxine 50 mcg tablet Take 1 tablet (50 mcg total) by mouth daily 90 tablet 1    propranolol (INDERAL) 20 mg tablet TAKE 1 TABLET (20 MG TOTAL) BY MOUTH 2 (TWO) TIMES A DAY AS NEEDED (ANXIETY, SYMPATHETIC AROUSAL) 60 tablet 0    hydrocortisone 2 5 % ointment Apply topically as needed 30 g 0    ISOtretinoin (ACCUTANE) 30 MG capsule Take 1 capsule (30 mg total) by mouth 2 (two) times a day 60 capsule 0     No current facility-administered medications for this visit  No Known Allergies    Review of Systems    Video Exam    Vitals:       Physical Exam     I spent 10 minutes directly with the patient during this visit    VIRTUAL VISIT [facility] verbally agrees to participate in [employer]  Pt is aware that [employer] could be limited without vital signs or the ability to perform a full hands-on physical exam  Doris Burrell understands she or the provider may request at any time to terminate the video visit and request the patient to seek care or treatment in person

## 2021-09-23 ENCOUNTER — TELEMEDICINE (OUTPATIENT)
Dept: PSYCHIATRY | Facility: CLINIC | Age: 21
End: 2021-09-23
Payer: COMMERCIAL

## 2021-09-23 DIAGNOSIS — F60.3 BORDERLINE PERSONALITY DISORDER (HCC): ICD-10-CM

## 2021-09-23 DIAGNOSIS — F51.04 PSYCHOPHYSIOLOGICAL INSOMNIA: ICD-10-CM

## 2021-09-23 DIAGNOSIS — F41.1 GENERALIZED ANXIETY DISORDER: Primary | ICD-10-CM

## 2021-09-23 DIAGNOSIS — F33.0 MILD EPISODE OF RECURRENT MAJOR DEPRESSIVE DISORDER (HCC): ICD-10-CM

## 2021-09-23 DIAGNOSIS — F43.10 PTSD (POST-TRAUMATIC STRESS DISORDER): ICD-10-CM

## 2021-09-23 PROCEDURE — 99214 OFFICE O/P EST MOD 30 MIN: CPT | Performed by: STUDENT IN AN ORGANIZED HEALTH CARE EDUCATION/TRAINING PROGRAM

## 2021-09-23 PROCEDURE — 90833 PSYTX W PT W E/M 30 MIN: CPT | Performed by: STUDENT IN AN ORGANIZED HEALTH CARE EDUCATION/TRAINING PROGRAM

## 2021-09-23 PROCEDURE — 4004F PT TOBACCO SCREEN RCVD TLK: CPT | Performed by: STUDENT IN AN ORGANIZED HEALTH CARE EDUCATION/TRAINING PROGRAM

## 2021-09-23 NOTE — BH TREATMENT PLAN
TREATMENT PLAN (Medication Management Only)        [address]    Name and Date of Birth:  John Hess 24 y o  2000  Date of Treatment Plan: September 23, 2021  Diagnosis/Diagnoses:    1  Generalized anxiety disorder    2  PTSD (post-traumatic stress disorder)    3  Mild episode of recurrent major depressive disorder ([facility] [address] )    4  Borderline personality disorder ([facility] [address] )    5  Psychophysiological insomnia      Strengths/Personal Resources for Self-Care: supportive family, supportive friends, ability to adapt to life changes, ability to communicate needs, ability to communicate well, ability to listen, ability to reason, average or above intelligence, good physical health, independence, ability to negotiate basic needs, being resoureceful, self-reliance, sense of humor, special hobby/interest, stable employment, willingness to work on problems  Area/Areas of need (in own words): depressive symptoms  1  Long Term Goal: maintain control of depression  Target Date:6 months - 3/23/2022  Person/Persons responsible for completion of goal: Doris Gannon  Short Term Objective (s) - How will we reach this goal?:   A  Provider new recommended medication/dosage changes and/or continue medication(s): continue current medications as prescribed  B  Attend medication management appointments regularly  C  Limit use of cannabis  D  Start individual psychotherapy within next 4-6 months  E  Utilize mother and sister more to decompress  Target Date:6 months - 3/23/2022  Person/Persons Responsible for Completion of Goal: Lester Ayoub  Progress Towards Goals: stable  Treatment Modality: medication management every 4 months  Review due 180 days from date of this plan: 6 months - 3/23/2022  Expected length of service: ongoing treatment  My Physician/PA/NP and I have developed this plan together and I agree to work on the goals and objectives   I understand the treatment goals that were developed for my treatment  Treatment Plan completed with assistance and input from patient and verbal consent provided  Treatment plan was not signed at time of office visit secondary to COVID-19 social distancing guidelines

## 2021-09-23 NOTE — PSYCH
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 sleeping hygiene, stress reduction, spending time with family and spending time with friends reviewed with Brittany Wilson  Educated on importance of medication and treatment compliance  Importance of follow up with family physician for medical issues reviewed with Brittany Wilson  Supportive therapy provided  Cognitive therapy was utilized during the session  Treatment Plan:    Completed and signed during the session: Yes - Treatment Plan done but not signed at time of office visit due to:  Plan reviewed in person and verbal consent given due to Peri social distancing    Note Share Disclaimer:      This note was not shared with the patient due to reasonable likelihood of causing patient harm    Dayanna Benton MD 09/23/21

## 2021-09-27 ENCOUNTER — TELEPHONE (OUTPATIENT)
Dept: PSYCHIATRY | Facility: CLINIC | Age: 21
End: 2021-09-27

## 2021-09-30 ENCOUNTER — TELEPHONE (OUTPATIENT)
Dept: FAMILY MEDICINE CLINIC | Facility: CLINIC | Age: 21
End: 2021-09-30

## 2021-10-04 ENCOUNTER — OFFICE VISIT (OUTPATIENT)
Dept: FAMILY MEDICINE CLINIC | Facility: CLINIC | Age: 21
End: 2021-10-04
Payer: COMMERCIAL

## 2021-10-04 VITALS
BODY MASS INDEX: 26.5 KG/M2 | SYSTOLIC BLOOD PRESSURE: 110 MMHG | HEART RATE: 84 BPM | WEIGHT: 155.2 LBS | RESPIRATION RATE: 16 BRPM | DIASTOLIC BLOOD PRESSURE: 70 MMHG | OXYGEN SATURATION: 99 % | TEMPERATURE: 98.9 F | HEIGHT: 64 IN

## 2021-10-04 DIAGNOSIS — R07.1 CHEST PAIN ON BREATHING: Primary | ICD-10-CM

## 2021-10-04 DIAGNOSIS — M54.2 NECK PAIN: ICD-10-CM

## 2021-10-04 PROCEDURE — 99214 OFFICE O/P EST MOD 30 MIN: CPT | Performed by: FAMILY MEDICINE

## 2021-10-05 PROCEDURE — 93000 ELECTROCARDIOGRAM COMPLETE: CPT | Performed by: FAMILY MEDICINE

## 2021-10-15 ENCOUNTER — OFFICE VISIT (OUTPATIENT)
Dept: DERMATOLOGY | Facility: CLINIC | Age: 21
End: 2021-10-15
Payer: COMMERCIAL

## 2021-10-15 VITALS — WEIGHT: 155.2 LBS | HEIGHT: 64 IN | TEMPERATURE: 98.4 F | BODY MASS INDEX: 26.5 KG/M2

## 2021-10-15 DIAGNOSIS — L29.9 ITCHING: Primary | ICD-10-CM

## 2021-10-15 PROCEDURE — 4004F PT TOBACCO SCREEN RCVD TLK: CPT | Performed by: DERMATOLOGY

## 2021-10-15 PROCEDURE — 3008F BODY MASS INDEX DOCD: CPT | Performed by: DERMATOLOGY

## 2021-10-15 PROCEDURE — 99213 OFFICE O/P EST LOW 20 MIN: CPT | Performed by: DERMATOLOGY

## 2021-10-15 RX ORDER — PRAMOXINE HYDROCHLORIDE 1 MG/ML
LOTION TOPICAL
Qty: 222 ML | Refills: 0 | Status: SHIPPED | OUTPATIENT
Start: 2021-10-15 | End: 2022-01-19

## 2021-11-02 ENCOUNTER — EVALUATION (OUTPATIENT)
Dept: PHYSICAL THERAPY | Facility: REHABILITATION | Age: 21
End: 2021-11-02
Payer: COMMERCIAL

## 2021-11-02 DIAGNOSIS — M54.2 NECK PAIN: ICD-10-CM

## 2021-11-02 PROCEDURE — 97161 PT EVAL LOW COMPLEX 20 MIN: CPT

## 2021-11-02 PROCEDURE — 97110 THERAPEUTIC EXERCISES: CPT

## 2021-11-15 ENCOUNTER — OFFICE VISIT (OUTPATIENT)
Dept: PHYSICAL THERAPY | Facility: REHABILITATION | Age: 21
End: 2021-11-15
Payer: COMMERCIAL

## 2021-11-15 DIAGNOSIS — M54.2 NECK PAIN: Primary | ICD-10-CM

## 2021-11-15 PROCEDURE — 97530 THERAPEUTIC ACTIVITIES: CPT

## 2021-11-15 PROCEDURE — 97110 THERAPEUTIC EXERCISES: CPT

## 2021-11-15 PROCEDURE — 97112 NEUROMUSCULAR REEDUCATION: CPT

## 2021-11-18 ENCOUNTER — OFFICE VISIT (OUTPATIENT)
Dept: PHYSICAL THERAPY | Facility: REHABILITATION | Age: 21
End: 2021-11-18
Payer: COMMERCIAL

## 2021-11-18 DIAGNOSIS — M54.2 NECK PAIN: Primary | ICD-10-CM

## 2021-11-18 PROCEDURE — 97530 THERAPEUTIC ACTIVITIES: CPT

## 2021-11-18 PROCEDURE — 97112 NEUROMUSCULAR REEDUCATION: CPT

## 2021-11-18 PROCEDURE — 97110 THERAPEUTIC EXERCISES: CPT

## 2021-11-22 ENCOUNTER — OFFICE VISIT (OUTPATIENT)
Dept: PHYSICAL THERAPY | Facility: REHABILITATION | Age: 21
End: 2021-11-22
Payer: COMMERCIAL

## 2021-11-22 DIAGNOSIS — M54.2 NECK PAIN: Primary | ICD-10-CM

## 2021-11-22 PROCEDURE — 97530 THERAPEUTIC ACTIVITIES: CPT

## 2021-11-22 PROCEDURE — 97112 NEUROMUSCULAR REEDUCATION: CPT

## 2021-11-22 PROCEDURE — 97110 THERAPEUTIC EXERCISES: CPT

## 2021-11-23 ENCOUNTER — OFFICE VISIT (OUTPATIENT)
Dept: PHYSICAL THERAPY | Facility: REHABILITATION | Age: 21
End: 2021-11-23
Payer: COMMERCIAL

## 2021-11-23 DIAGNOSIS — M54.2 NECK PAIN: Primary | ICD-10-CM

## 2021-11-23 PROCEDURE — 97110 THERAPEUTIC EXERCISES: CPT

## 2021-11-23 PROCEDURE — 97530 THERAPEUTIC ACTIVITIES: CPT

## 2021-11-23 PROCEDURE — 97112 NEUROMUSCULAR REEDUCATION: CPT

## 2021-11-29 ENCOUNTER — OFFICE VISIT (OUTPATIENT)
Dept: PHYSICAL THERAPY | Facility: REHABILITATION | Age: 21
End: 2021-11-29
Payer: COMMERCIAL

## 2021-11-29 DIAGNOSIS — M54.2 NECK PAIN: Primary | ICD-10-CM

## 2021-11-29 PROCEDURE — 97110 THERAPEUTIC EXERCISES: CPT

## 2021-11-29 PROCEDURE — 97530 THERAPEUTIC ACTIVITIES: CPT

## 2021-11-29 PROCEDURE — 97112 NEUROMUSCULAR REEDUCATION: CPT

## 2021-12-02 ENCOUNTER — APPOINTMENT (OUTPATIENT)
Dept: PHYSICAL THERAPY | Facility: REHABILITATION | Age: 21
End: 2021-12-02
Payer: COMMERCIAL

## 2021-12-13 ENCOUNTER — APPOINTMENT (OUTPATIENT)
Dept: PHYSICAL THERAPY | Facility: REHABILITATION | Age: 21
End: 2021-12-13
Payer: COMMERCIAL

## 2021-12-16 ENCOUNTER — OFFICE VISIT (OUTPATIENT)
Dept: PHYSICAL THERAPY | Facility: REHABILITATION | Age: 21
End: 2021-12-16
Payer: COMMERCIAL

## 2021-12-16 ENCOUNTER — APPOINTMENT (OUTPATIENT)
Dept: PHYSICAL THERAPY | Facility: REHABILITATION | Age: 21
End: 2021-12-16
Payer: COMMERCIAL

## 2021-12-16 DIAGNOSIS — M54.2 NECK PAIN: Primary | ICD-10-CM

## 2021-12-16 PROCEDURE — 97112 NEUROMUSCULAR REEDUCATION: CPT

## 2021-12-16 PROCEDURE — 97110 THERAPEUTIC EXERCISES: CPT

## 2021-12-16 PROCEDURE — 97530 THERAPEUTIC ACTIVITIES: CPT

## 2021-12-23 ENCOUNTER — TELEPHONE (OUTPATIENT)
Dept: PSYCHIATRY | Facility: CLINIC | Age: 21
End: 2021-12-23

## 2021-12-23 ENCOUNTER — TELEMEDICINE (OUTPATIENT)
Dept: BEHAVIORAL/MENTAL HEALTH CLINIC | Facility: CLINIC | Age: 21
End: 2021-12-23
Payer: COMMERCIAL

## 2021-12-23 DIAGNOSIS — F33.1 MAJOR DEPRESSIVE DISORDER, RECURRENT EPISODE, MODERATE (HCC): ICD-10-CM

## 2021-12-23 DIAGNOSIS — F43.10 POST TRAUMATIC STRESS DISORDER (PTSD): Primary | ICD-10-CM

## 2021-12-23 PROCEDURE — 90791 PSYCH DIAGNOSTIC EVALUATION: CPT | Performed by: COUNSELOR

## 2021-12-30 ENCOUNTER — TELEPHONE (OUTPATIENT)
Dept: OBGYN CLINIC | Facility: CLINIC | Age: 21
End: 2021-12-30

## 2021-12-30 ENCOUNTER — TELEMEDICINE (OUTPATIENT)
Dept: BEHAVIORAL/MENTAL HEALTH CLINIC | Facility: CLINIC | Age: 21
End: 2021-12-30
Payer: COMMERCIAL

## 2021-12-30 DIAGNOSIS — F33.1 MAJOR DEPRESSIVE DISORDER, RECURRENT EPISODE, MODERATE (HCC): Primary | ICD-10-CM

## 2021-12-30 DIAGNOSIS — N88.2 CERVICAL STENOSIS (UTERINE CERVIX): Primary | ICD-10-CM

## 2021-12-30 DIAGNOSIS — Z86.59 HISTORY OF POSTTRAUMATIC STRESS DISORDER (PTSD): ICD-10-CM

## 2021-12-30 PROCEDURE — 90834 PSYTX W PT 45 MINUTES: CPT | Performed by: COUNSELOR

## 2021-12-30 RX ORDER — MISOPROSTOL 200 UG/1
600 TABLET ORAL ONCE
Qty: 3 TABLET | Refills: 0 | Status: SHIPPED | OUTPATIENT
Start: 2021-12-30 | End: 2022-02-28

## 2022-01-03 ENCOUNTER — TELEMEDICINE (OUTPATIENT)
Dept: BEHAVIORAL/MENTAL HEALTH CLINIC | Facility: CLINIC | Age: 22
End: 2022-01-03
Payer: COMMERCIAL

## 2022-01-03 DIAGNOSIS — F33.1 MAJOR DEPRESSIVE DISORDER, RECURRENT EPISODE, MODERATE (HCC): Primary | ICD-10-CM

## 2022-01-03 DIAGNOSIS — Z86.59 HISTORY OF POSTTRAUMATIC STRESS DISORDER (PTSD): ICD-10-CM

## 2022-01-03 PROCEDURE — 90834 PSYTX W PT 45 MINUTES: CPT | Performed by: COUNSELOR
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
## 2022-01-06 ENCOUNTER — OFFICE VISIT (OUTPATIENT)
Dept: FAMILY MEDICINE CLINIC | Facility: CLINIC | Age: 22
End: 2022-01-06
Payer: COMMERCIAL

## 2022-01-06 ENCOUNTER — TELEPHONE (OUTPATIENT)
Dept: OBGYN CLINIC | Facility: CLINIC | Age: 22
End: 2022-01-06

## 2022-01-06 VITALS
HEART RATE: 96 BPM | RESPIRATION RATE: 16 BRPM | SYSTOLIC BLOOD PRESSURE: 120 MMHG | WEIGHT: 155 LBS | BODY MASS INDEX: 26.46 KG/M2 | HEIGHT: 64 IN | OXYGEN SATURATION: 98 % | TEMPERATURE: 99.9 F | DIASTOLIC BLOOD PRESSURE: 70 MMHG

## 2022-01-06 DIAGNOSIS — L03.811 CELLULITIS OF HEAD EXCEPT FACE: Primary | ICD-10-CM

## 2022-01-06 DIAGNOSIS — Z23 FLU VACCINE NEED: ICD-10-CM

## 2022-01-06 PROCEDURE — 99214 OFFICE O/P EST MOD 30 MIN: CPT | Performed by: FAMILY MEDICINE

## 2022-01-06 PROCEDURE — 1036F TOBACCO NON-USER: CPT | Performed by: FAMILY MEDICINE

## 2022-01-06 PROCEDURE — 90471 IMMUNIZATION ADMIN: CPT

## 2022-01-06 PROCEDURE — 90686 IIV4 VACC NO PRSV 0.5 ML IM: CPT

## 2022-01-06 RX ORDER — CEPHALEXIN 500 MG/1
500 CAPSULE ORAL EVERY 6 HOURS SCHEDULED
Qty: 20 CAPSULE | Refills: 0 | Status: SHIPPED | OUTPATIENT
Start: 2022-01-06 | End: 2022-01-11
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
## 2022-01-10 ENCOUNTER — TELEMEDICINE (OUTPATIENT)
Dept: BEHAVIORAL/MENTAL HEALTH CLINIC | Facility: CLINIC | Age: 22
End: 2022-01-10
Payer: COMMERCIAL

## 2022-01-10 DIAGNOSIS — Z86.59 HISTORY OF POSTTRAUMATIC STRESS DISORDER (PTSD): ICD-10-CM

## 2022-01-10 DIAGNOSIS — F41.1 GENERALIZED ANXIETY DISORDER: ICD-10-CM

## 2022-01-10 DIAGNOSIS — F33.1 MAJOR DEPRESSIVE DISORDER, RECURRENT EPISODE, MODERATE (HCC): Primary | ICD-10-CM

## 2022-01-10 PROCEDURE — 90834 PSYTX W PT 45 MINUTES: CPT | Performed by: COUNSELOR
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
## 2022-01-19 ENCOUNTER — PROCEDURE VISIT (OUTPATIENT)
Dept: OBGYN CLINIC | Facility: CLINIC | Age: 22
End: 2022-01-19
Payer: COMMERCIAL

## 2022-01-19 ENCOUNTER — TELEMEDICINE (OUTPATIENT)
Dept: BEHAVIORAL/MENTAL HEALTH CLINIC | Facility: CLINIC | Age: 22
End: 2022-01-19
Payer: COMMERCIAL

## 2022-01-19 VITALS
SYSTOLIC BLOOD PRESSURE: 110 MMHG | DIASTOLIC BLOOD PRESSURE: 68 MMHG | HEIGHT: 64 IN | WEIGHT: 154 LBS | BODY MASS INDEX: 26.29 KG/M2

## 2022-01-19 DIAGNOSIS — Z30.433 ENCOUNTER FOR REMOVAL AND REINSERTION OF INTRAUTERINE CONTRACEPTIVE DEVICE (IUD): Primary | ICD-10-CM

## 2022-01-19 DIAGNOSIS — Z86.59 HISTORY OF POSTTRAUMATIC STRESS DISORDER (PTSD): ICD-10-CM

## 2022-01-19 DIAGNOSIS — F41.1 GENERALIZED ANXIETY DISORDER: ICD-10-CM

## 2022-01-19 DIAGNOSIS — F33.1 MAJOR DEPRESSIVE DISORDER, RECURRENT EPISODE, MODERATE (HCC): Primary | ICD-10-CM

## 2022-01-19 PROBLEM — N94.10 DYSPAREUNIA, FEMALE: Status: RESOLVED | Noted: 2020-02-06 | Resolved: 2022-01-19

## 2022-01-19 PROBLEM — N93.0 BLEEDING AFTER INTERCOURSE: Status: RESOLVED | Noted: 2019-09-27 | Resolved: 2022-01-19

## 2022-01-19 PROCEDURE — 90834 PSYTX W PT 45 MINUTES: CPT | Performed by: COUNSELOR

## 2022-01-19 PROCEDURE — 3008F BODY MASS INDEX DOCD: CPT | Performed by: FAMILY MEDICINE

## 2022-01-19 PROCEDURE — 58301 REMOVE INTRAUTERINE DEVICE: CPT | Performed by: NURSE PRACTITIONER

## 2022-01-19 PROCEDURE — 58300 INSERT INTRAUTERINE DEVICE: CPT | Performed by: NURSE PRACTITIONER

## 2022-01-19 RX ORDER — COPPER 313.4 MG/1
1 INTRAUTERINE DEVICE INTRAUTERINE ONCE
Status: COMPLETED | OUTPATIENT
Start: 2022-01-19 | End: 2022-01-19

## 2022-01-19 RX ADMIN — COPPER 1 INTRA UTERINE DEVICE: 313.4 INTRAUTERINE DEVICE INTRAUTERINE at 15:55

## 2022-01-19 NOTE — PROGRESS NOTES
[address] 24year-old here for removal of Mirena and insertion of ParaGard  She is not happy with the side effects of the Mirena on her skin  Patient's last menstrual period was 12/28/2021  Iud removal    Date/Time: 1/19/2022 3:35 PM  Performed by: BRENTON Gifford  Authorized by: BRENTON Gifford   Universal Protocol:  Risks and benefits: risks, benefits and alternatives were discussed  Consent given by: patient  Time out: Immediately prior to procedure a "time out" was called to verify the correct patient, procedure, equipment, support staff and site/side marked as required  Timeout called at: 1/19/2022 3:35 PM   Patient understanding: patient states understanding of the procedure being performed  Patient identity confirmed: verbally with patient      Procedure:     Removed with no complications: yes      Other reason for removal:  Replacing with ParaGard  Comments:      IUD string grasped with forceps and removed with gentle traction  IUD shown to patient and discarded  Iud insertions    Date/Time: 1/19/2022 3:40 PM  Performed by: BRENTON Gifford  Authorized by: BRENTON Gifford   Universal Protocol:  Consent: Verbal consent obtained  Risks and benefits: risks, benefits and alternatives were discussed  Consent given by: patient  Time out: Immediately prior to procedure a "time out" was called to verify the correct patient, procedure, equipment, support staff and site/side marked as required  Timeout called at: 1/19/2022 3:40 PM   Patient understanding: patient states understanding of the procedure being performed  Patient identity confirmed: verbally with patient        Procedure:     Pelvic exam performed: yes      Negative GC/chlamydia test: low risk  Negative urine pregnancy test: LMP 12/28/21        Cervix cleaned and prepped: yes      Speculum placed in vagina: yes      Tenaculum applied to cervix: yes      Uterus sounded: yes      Uterus sound depth (cm):  7    IUD inserted with no complications: yes      IUD type:  ParaGard    Strings trimmed: yes    Post-procedure:     Patient tolerated procedure well: yes      Patient will follow up after next period: yes    Comments:      IUD inserted without difficulty  A bedside US was performed and shows no evidence of perforation  Patient given instruction booklet  She is to return in 5 weeks for follow up  Christa Dunaway was seen today for procedure      Diagnoses and all orders for this visit:    Encounter for removal and reinsertion of intrauterine contraceptive device (IUD)    Other orders  -     Iud removal  -     Iud insertions

## 2022-01-19 NOTE — PSYCH
Psychotherapy Provided: Individual Psychotherapy 45 minutes     Length of time in session: 42 minutes, follow up in 1 week    Encounter Diagnosis     ICD-10-CM    1  Major depressive disorder, recurrent episode, moderate (HCC)  F33 1    2  History of posttraumatic stress disorder (PTSD)  Z86 59    3  Generalized anxiety disorder  F41 1        Goals addressed in session: Goal 1     Pain:      none    0    Current suicide risk : Low     Time: 9:18am - 10:00am  Previous session plan: Check in with using more mindfulness work (aware of deep breathing, grounding, not open to combing music with other tasks), considering working out more often, eating healthier, setting hard limits to her breaks during sessions, turning technology time into something more productive, like reading a book, unsubbing from negative reddit channels  "I have this idea of perfectionism in my head" "No matter what I do I can never live up to that standard" "Disappointment in myself" Working on small, manageable goals  "I've worked really hard to get myself where I am now"  Check in with setting a timer on her phone to lock her out  Going for a walks more often to not look at a screen  Focus on feeling she gets when she completes a task  D: She says that she broke up with TJ  She says she found more messages from TJ's ex on his computer that upset her - she says she didn't see him setting more firm boundaries with his ex  Explored why she believes this is the right decision for her  She says he "Makes dumb decisions, he's not very emotionally intelligent", she seems to desire someone who is more independent, functional  She says she wants to try to remain friends with him, but they will take some space apart  She says this is "extra hard" because he's not out of the apartment yet  She wonders if she's just pushing people away after a certain period of time  She says she developed an emotional dependence with TJ after her friend  in    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, 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 made in her major, we were "really, really good friends", she is crying as she discusses him  She had feelings for him  "He really saw me, was always really kind"  "Part of me thinks If I saw him that weekend, maybe it wouldn't have happened", Sulma Grayson lived in [place]  This writer provided PCT through empathic listening, validation of feelings, reflection of content and feelings   A: Jose Memos appeared to be in a dysphoric mood with congruent affect, seemed to be experiencing grief reaction, thoughts of hopelessness and worthlessness, lower mood at times, excessive guilt at times, appeared to be kempt, no SI/HI nor GOMEZ risk apparent or reported, seemed to respond well to PCT  P: Broke up with TJ  "Part of me thinks If I saw [Eber] that weekend, maybe it wouldn't have happened" "They had such sadness in their eyes, they had pity" regarding her friends in college after they lost Sulma Grayson  Many saw them as dating, but they were not  Check in with using more mindfulness work (aware of deep breathing, grounding, not open to combing music with other tasks), considering working out more often, eating healthier, setting hard limits to her breaks during sessions, turning technology time into something more productive, like reading a book, unsubbing from negative reddit channels  "I have this idea of perfectionism in my head" "No matter what I do I can never live up to that standard" "Disappointment in myself" Working on small, manageable goals  "I've worked really hard to get myself where I am now"  Check in with setting a timer on her phone to lock her out  Going for a walks more often to not look at a screen  Focus on feeling she gets when she completes a task  Behavioral Health Treatment Plan [facility]: Diagnosis and Treatment Plan explained to Silviano Nicholas relates understanding diagnosis and is agreeable to Treatment Plan   Yes     Telemedicine consent    Patient: Zahra Ayala  Provider: Wilson Noriega  Provider located at [address] [place] [id number] [phone]    The patient was identified by name and date of birth  Zahra Ayala was informed that this is a telemedicine visit and that the visit is being conducted through [facility] and patient was informed this is a secure, HIPAA-complaint platform  She agrees to proceed     My office door was closed  No one else was in the room  She acknowledged consent and understanding of privacy and security of the video platform  The patient has agreed to participate and understands they can discontinue the visit at any time  Patient is aware this is a billable service  I spent 42 minutes with the patient during this visit  `

## 2022-01-21 NOTE — TELEPHONE ENCOUNTER
1/25/18 was her last physical
Did patient had her PE this year
fax
[FreeTextEntry1] : 73 year old man with a history of PMR on steroids, knee arthritis, GERD, aspiration, hiatal hernia, double vision, HTN.\par \par He had a nuclear stress test in 2018 with mild mid anterior ischemia. He underwent a coronary CT which was remarkable for CAD that was <30%. He had an echo in 11/2018 that showed normal systolic LV function without any significant other findings, including no significant valvular disease. He had a  carotid doppler that showed no flow limiting lesion. \par \par Since his last visit, he had an abnormal exercise stress. He was SOB on the treadmill and still complaining of MANN when walking up the stairs. He   denies any chest pain, PND, orthopnea,  near syncope, syncope, strokelike symptoms, lower extremity edema, palpitations. He denies any blurry vision, headaches. \par He is compliant with his medications. \par  \par

## 2022-01-24 ENCOUNTER — TELEMEDICINE (OUTPATIENT)
Dept: BEHAVIORAL/MENTAL HEALTH CLINIC | Facility: CLINIC | Age: 22
End: 2022-01-24
Payer: COMMERCIAL

## 2022-01-24 DIAGNOSIS — F41.1 GENERALIZED ANXIETY DISORDER: ICD-10-CM

## 2022-01-24 DIAGNOSIS — F33.1 MAJOR DEPRESSIVE DISORDER, RECURRENT EPISODE, MODERATE (HCC): Primary | ICD-10-CM

## 2022-01-24 DIAGNOSIS — Z86.59 HISTORY OF POSTTRAUMATIC STRESS DISORDER (PTSD): ICD-10-CM

## 2022-01-24 PROCEDURE — 90834 PSYTX W PT 45 MINUTES: CPT | Performed by: COUNSELOR

## 2022-01-24 NOTE — PSYCH
Psychotherapy Provided: Individual Psychotherapy 45 minutes     Length of time in session: 53 minutes, follow up in 1 week    Encounter Diagnosis     ICD-10-CM    1  Major depressive disorder, recurrent episode, moderate (HCC)  F33 1    2  History of posttraumatic stress disorder (PTSD)  Z86 59    3  Generalized anxiety disorder  F41 1        Goals addressed in session: Goal 1     Pain:      none    0    Current suicide risk : Low     Time: 10:00am - 10:53am  Previous session plan: Broke up with TJ  "Part of me thinks If I saw [Eber] that weekend, maybe it wouldn't have happened" "They had such sadness in their eyes, they had pity" regarding her friends in college after they lost Herbert  Many saw them as dating, but they were not  Check in with using more mindfulness work (aware of deep breathing, grounding, not open to combing music with other tasks), considering working out more often, eating healthier, setting hard limits to her breaks during sessions, turning technology time into something more productive, like reading a book, unsubbing from negative reddit channels  "I have this idea of perfectionism in my head" "No matter what I do I can never live up to that standard" "Disappointment in myself" Working on small, manageable goals  "I've worked really hard to get myself where I am now"  Check in with setting a timer on her phone to lock her out  Going for a walks more often to not look at a screen  Focus on feeling she gets when she completes a task  D: She says that she was able to clean her apartment this past weekend, organizing her apartment  She say she's feeling "good" today  She says she has been managing the breakup well  She says she wants to remain friends with him  She says she has really figured out her budget and she can make it work without [facility]  Says she is still putting money away for savings as well  She says that she is going to buy the nintendo switch from her boyfriend   Says she removed instagram from her homescreen, has been on Reddit a lot less  "I've been going out a lot more"  Says she met someone at a bar but she's not hanging out with him anymore due to red flags  Says she is focusing on building friendships right now, focusing on finding girlfriends  Moved into discussion about death, dying  She's been watching "[employer]" and she has been thinking about it more recently  Discussed her views on life and death  "I haven't been living for myself"  Focused on what she believes happens when we die  Discussed what it means to her to "reallly live" and engage presently in her life  "I probably lean more toward reincarnation"  "I definitely know there is an afterlife, so to speak"  This writer provided PCT through rapport building, empathic listening, validation of feelings  ET work through discussion about death anxiety, isolation, freedom and her grappling with these concerns  A: Vargas Harvey appeared to be in a euthymic mood with congruent affect, seemed to be experiencing lower mood at times, excessive worry, thoughts of hopelessness and worthlessness, fleeting thoughts of death, appeared to be kempt, no SI/HI nor GOMEZ risk apparent or reported, seemed to respond well to PCT  P: "It's really important that while you are living you find happiness"  "I haven't been living for myself"  "While I'm alive here today, I want to make a positive impact" "I want to get more involved in the community, maybe volunteer somewhere"  Replacing "bad habits" with healthy ones, enjoys getting outside, drawing  Discuss how she can shift focus on appreciating life  Check in with maintaining reduced social media usage to feel better  Believes in reincarnation, that consciousness persists  "Part of me thinks If I saw [Eber] that weekend, maybe it wouldn't have happened" "They had such sadness in their eyes, they had pity" regarding her friends in college after they lost [place]   Many saw them as dating, but they were not  Check in with using more mindfulness work (aware of deep breathing, grounding, not open to combing music with other tasks)  "I have this idea of perfectionism in my head" "No matter what I do I can never live up to that standard" "Disappointment in myself" Working on small, manageable goals  "I've worked really hard to get myself where I am now"  Check in with setting a timer on her phone to lock her out  Going for a walks more often to not look at a screen  Focus on feeling she gets when she completes a task  Behavioral Health Treatment Plan [facility]: Diagnosis and Treatment Plan explained to Darrylamandateen Porter relates understanding diagnosis and is agreeable to Treatment Plan  Yes     Telemedicine consent    Patient: Vince Hallman  Provider: Uriel Lake  Provider located at [address] [place] [id number] [phone]    The patient was identified by name and date of birth  Vince Hallman was informed that this is a telemedicine visit and that the visit is being conducted through [facility] and patient was informed this is a secure, HIPAA-complaint platform  She agrees to proceed     My office door was closed  No one else was in the room  She acknowledged consent and understanding of privacy and security of the video platform  The patient has agreed to participate and understands they can discontinue the visit at any time  Patient is aware this is a billable service  I spent  53 minutes with the patient during this visit

## 2022-01-31 ENCOUNTER — TELEMEDICINE (OUTPATIENT)
Dept: BEHAVIORAL/MENTAL HEALTH CLINIC | Facility: CLINIC | Age: 22
End: 2022-01-31
Payer: COMMERCIAL

## 2022-01-31 DIAGNOSIS — F33.1 MAJOR DEPRESSIVE DISORDER, RECURRENT EPISODE, MODERATE (HCC): Primary | ICD-10-CM

## 2022-01-31 DIAGNOSIS — F41.1 GENERALIZED ANXIETY DISORDER: ICD-10-CM

## 2022-01-31 DIAGNOSIS — Z86.59 HISTORY OF POSTTRAUMATIC STRESS DISORDER (PTSD): ICD-10-CM

## 2022-01-31 PROCEDURE — 90834 PSYTX W PT 45 MINUTES: CPT | Performed by: COUNSELOR

## 2022-01-31 NOTE — PSYCH
Psychotherapy Provided: Individual Psychotherapy 45 minutes     Length of time in session: 52 minutes, follow up in 1 week    Encounter Diagnosis     ICD-10-CM    1  Major depressive disorder, recurrent episode, moderate (HCC)  F33 1    2  History of posttraumatic stress disorder (PTSD)  Z86 59    3  Generalized anxiety disorder  F41 1        Goals addressed in session: Goal 1     Pain:      none    0    Current suicide risk : Low     Time: 8:02am - 8:54am  Previous session plan: "It's really important that while you are living you find happiness"  "I haven't been living for myself"  "While I'm alive here today, I want to make a positive impact" "I want to get more involved in the community, maybe volunteer somewhere"  Replacing "bad habits" with healthy ones, enjoys getting outside, drawing  Discuss how she can shift focus on appreciating life  Check in with maintaining reduced social media usage to feel better  Believes in reincarnation, that consciousness persists  "Part of me thinks If I saw [Eber] that weekend, maybe it wouldn't have happened" "They had such sadness in their eyes, they had pity" regarding her friends in college after they lost Shannonberg  Many saw them as dating, but they were not  Check in with using more mindfulness work (aware of deep breathing, grounding, not open to combing music with other tasks)  "I have this idea of perfectionism in my head" "No matter what I do I can never live up to that standard" "Disappointment in myself" Working on small, manageable goals  "I've worked really hard to get myself where I am now"  Check in with setting a timer on her phone to lock her out  Going for a walks more often to not look at a screen  Focus on feeling she gets when she completes a task  D: She says that she has been enjoying her time with taking care of her plants  She say she went out with her friend on Saturday, other friends on Friday  Enjoying playing pool   Says she has been enjoying going out to [employer]  "I went on a date yesterday" she says it was casual, says she likes this person as a friend  Says friends Roseline Schroederar and Steve Morris, says she is going to see her friend Ji Andersen in th coming weeks, who lives in [place]  She says she spent time with Evelyn Alvarado and enjoyed her time with her, is able to trust Evelyn Alvarado  She says she did feel like she was living, living for herself  "Overall I've been trying to meet new people"  Says she hung out with TJ this weekend, bought a nice TV  She says TJ moved out  She says she has helped TJ move some of his things  Discussed level of responsibility related to Herbert  She says that Herbert was supposed to come over to her dorm the Wednesday before he , but her RA would not let him into the dorm  They rescheduled for two days later, but she passed out Friday  "Sometimes I would say really inappropriate things"    This writer provided ET through discussion about what it means for her to be "really living"  A: Vargas Harvey appeared to be in a mildly euthymic mood with congruent affect, seemed to be experiencing reduced symptoms of depression through lower mood at times, mild thoughts of hopelessness and worthlessness, mild anhedonia, appeared to be kempt, no SI/HI nor GOMEZ risk apparent or reported, seemed to respond well to PCT, CBT work, ET work  P: Check in with thoughts on Eber, discussion about levels of responsibility  CONTINUED: "It's really important that while you are living you find happiness"  "I haven't been living for myself"  "While I'm alive here today, I want to make a positive impact" "I want to get more involved in the community, maybe volunteer somewhere"  Replacing "bad habits" with healthy ones, enjoys getting outside, drawing  Discuss how she can shift focus on appreciating life  Check in with maintaining reduced social media usage to feel better  Believes in reincarnation, that consciousness persists   "Part of me thinks If I saw [Eber] that weekend, maybe it wouldn't have happened" "They had such sadness in their eyes, they had pity" regarding her friends in college after they lost Herbert  Many saw them as dating, but they were not  Check in with using more mindfulness work (aware of deep breathing, grounding, not open to combing music with other tasks)  "I have this idea of perfectionism in my head" "No matter what I do I can never live up to that standard" "Disappointment in myself" Working on small, manageable goals  "I've worked really hard to get myself where I am now"  Check in with setting a timer on her phone to lock her out  Going for a walks more often to not look at a screen  Focus on feeling she gets when she completes a task  Behavioral Health Treatment Plan [facility]: Diagnosis and Treatment Plan explained to Hernan Kimberly relates understanding diagnosis and is agreeable to Treatment Plan  Yes     Telemedicine consent    Patient: Lloyd Norwood  Provider: Vanessa Carbajal  Provider located at [address] [place] [id number] [phone]    The patient was identified by name and date of birth  Lloyd Norwood was informed that this is a telemedicine visit and that the visit is being conducted through [facility] and patient was informed this is a secure, HIPAA-complaint platform  She agrees to proceed     My office door was closed  No one else was in the room  She acknowledged consent and understanding of privacy and security of the video platform  The patient has agreed to participate and understands they can discontinue the visit at any time  Patient is aware this is a billable service  I spent 54 minutes with the patient during this visit

## 2022-02-07 ENCOUNTER — TELEMEDICINE (OUTPATIENT)
Dept: BEHAVIORAL/MENTAL HEALTH CLINIC | Facility: CLINIC | Age: 22
End: 2022-02-07
Payer: COMMERCIAL

## 2022-02-07 DIAGNOSIS — F41.1 GENERALIZED ANXIETY DISORDER: ICD-10-CM

## 2022-02-07 DIAGNOSIS — Z86.59 HISTORY OF POSTTRAUMATIC STRESS DISORDER (PTSD): ICD-10-CM

## 2022-02-07 DIAGNOSIS — F33.1 MAJOR DEPRESSIVE DISORDER, RECURRENT EPISODE, MODERATE (HCC): Primary | ICD-10-CM

## 2022-02-07 PROCEDURE — 90834 PSYTX W PT 45 MINUTES: CPT | Performed by: COUNSELOR

## 2022-02-07 NOTE — PSYCH
Psychotherapy Provided: Individual Psychotherapy 45 minutes     Length of time in session: 53 minutes, follow up in 1 week    Encounter Diagnosis     ICD-10-CM    1  Major depressive disorder, recurrent episode, moderate (HCC)  F33 1    2  History of posttraumatic stress disorder (PTSD)  Z86 59    3  Generalized anxiety disorder  F41 1        Goals addressed in session: Goal 1     Pain:      none    0    Current suicide risk : Low     Time: 10:00am - 10:53am  Previous session plan: Check in with thoughts on Eber, discussion about levels of responsibility  CONTINUED: "It's really important that while you are living you find happiness"  "I haven't been living for myself"  "While I'm alive here today, I want to make a positive impact" "I want to get more involved in the community, maybe volunteer somewhere"  Replacing "bad habits" with healthy ones, enjoys getting outside, drawing  Discuss how she can shift focus on appreciating life  Check in with maintaining reduced social media usage to feel better  Believes in reincarnation, that consciousness persists   "Part of me thinks If I saw [Eber] that weekend, maybe it wouldn't have happened" "They had such sadness in their eyes, they had pity" regarding her friends in college after they lost [place]berg  Many saw them as dating, but they were not  Check in with using more mindfulness work (aware of deep breathing, grounding, not open to combing music with other tasks)   "I have this idea of perfectionism in my head" "No matter what I do I can never live up to that standard" "Disappointment in myself" Working on small, manageable goals  "I've worked really hard to get myself where I am now"  Check in with setting a timer on her phone to lock her out  Going for a walks   D: Working on reducing "shoulding"  Says that she has COVID-19  Says she has cough, sore throat, lethargy  Says she is continuing to work  Says resting this weekend was helpful   Reports on a review she had with work, manager said she's "doing an amazing job" and that she's heard more  Discussed a disappointing date, sexual encounter she had  Discussed how to express what she wants out of others, intimacy, reducing guilt about the encounter  Focused on the night she decided to not visit with [facility]  She says she had just moved, was anxious, having difficulties in school  "When I get overwhelmed I need to shut down" once in a while, that particular time she was feeling  "It really wasn't my fault"  "There were some things I could have done differently"  She says she recognizes that using marijuana before seeing others, or getting too tired to spend time with others  ET work focusing on death anxiety and reducing this  CBT through reframing, clarification questions, emphasis on her strengths with work, reinforcing her found solutions  This writer provided PCT through empathic listening, validation of feelings, reflection of content and feelings  A: Kourtney Valerio appeared to be in a mildly euthymic mood with congruent affect, seemed to be experiencing lower mood at times, reduced thoughts of hopelessness and worthlessness, appeared to be kempt, no SI/HI nor GOMEZ risk apparent or reported, seemed to respond well to PCT, CBT, ET interventions  P: Consider moving into working on mindfulness together  Positive qualities found: She is highly productive, positive buzz about her in the company, efficient  Check in with reaching out to two friends from college, Yoni Farrar friends  CONTINUED: "It's really important that while you are living you find happiness"  "I haven't been living for myself"  "While I'm alive here today, I want to make a positive impact" "I want to get more involved in the community, maybe volunteer somewhere"  Replacing "bad habits" with healthy ones, enjoys getting outside, drawing  Discuss how she can shift focus on appreciating life  Check in with maintaining reduced social media usage to feel better  Believes in reincarnation, that consciousness persists  Check in with using more mindfulness work (aware of deep breathing, grounding, not open to combing music with other tasks)   "I have this idea of perfectionism in my head" "No matter what I do I can never live up to that standard" "Disappointment in myself" Working on small, manageable goals  [facility]: Diagnosis and Treatment Plan explained to Kareem Demarcoyann relates understanding diagnosis and is agreeable to Treatment Plan  Yes     Telemedicine consent    Patient: Barbara Fritz  Provider: Beata Staples located at [address] [place] [id number] [phone]    The patient was identified by name and date of birth  Barbara Fall was informed that this is a telemedicine visit and that the visit is being conducted through [facility] and patient was informed this is a secure, HIPAA-complaint platform  She agrees to proceed     My office door was closed  No one else was in the room  She acknowledged consent and understanding of privacy and security of the video platform  The patient has agreed to participate and understands they can discontinue the visit at any time  Patient is aware this is a billable service  I spent 53 minutes with the patient during this visit

## 2022-02-14 ENCOUNTER — TELEMEDICINE (OUTPATIENT)
Dept: BEHAVIORAL/MENTAL HEALTH CLINIC | Facility: CLINIC | Age: 22
End: 2022-02-14
Payer: COMMERCIAL

## 2022-02-14 DIAGNOSIS — F41.1 GENERALIZED ANXIETY DISORDER: ICD-10-CM

## 2022-02-14 DIAGNOSIS — F33.1 MAJOR DEPRESSIVE DISORDER, RECURRENT EPISODE, MODERATE (HCC): Primary | ICD-10-CM

## 2022-02-14 DIAGNOSIS — Z86.59 HISTORY OF POSTTRAUMATIC STRESS DISORDER (PTSD): ICD-10-CM

## 2022-02-14 PROCEDURE — 90834 PSYTX W PT 45 MINUTES: CPT | Performed by: COUNSELOR

## 2022-02-14 NOTE — PSYCH
Psychotherapy Provided: Individual Psychotherapy 45 minutes     Length of time in session: 47 minutes, follow up in 1 day    Encounter Diagnosis     ICD-10-CM    1  Major depressive disorder, recurrent episode, moderate (HCC)  F33 1    2  History of posttraumatic stress disorder (PTSD)  Z86 59    3  Generalized anxiety disorder  F41 1        Goals addressed in session: Goal 1     Pain:      none    0    Current suicide risk : Low     Time: 8:04am - 8:51am  Previous session plan: Consider moving into working on mindfulness together  Positive qualities found: She is highly productive, positive buzz about her in the company, efficient  Check in with reaching out to two friends from [employer], Leah Coronar friends  CONTINUED: "It's really important that while you are living you find happiness"  "I haven't been living for myself"  "While I'm alive here today, I want to make a positive impact" "I want to get more involved in the community, maybe volunteer somewhere"  Replacing "bad habits" with healthy ones, enjoys getting outside, drawing  Discuss how she can shift focus on appreciating life  Check in with maintaining reduced social media usage to feel better  Believes in reincarnation, that consciousness persists  Check in with using more mindfulness work (aware of deep breathing, grounding, not open to combing music with other tasks)   "I have this idea of perfectionism in my head" "No matter what I do I can never live up to that standard" "Disappointment in myself" Working on small, manageable goals  D: She reports she has been talking to a boy from [place], someone she's known since 15  She says she has been talking just as friends right now but she's always had feelings for him  Says she went out to an [place] with TJ and a couple other friends  She says she has been working on decorating her apartment  Says she has been enjoying going on Tinder, bumble friends  "My friend capacity is kind of filling up right now"   Has a date tonight  "I had a day where I was overwhelmingly happy"  "I actually have friends that want to hang out with me" "Work is going super well" "I'm making better money" "I don't think I've ever been so set" "I haven't had a lot of this growing up"  She says she knows what she wants to do with her job right now  "I have a high degree of confidence in my abilities right now" "I'm very capable of doing what I want to, I've become really responsible" "I like who I am" "I'm a good person"  "Within the past two weeks I haven't had one really bad day" She says she did reach out to friends from college  "I'm getting more comfortable being alone" This writer provided PCT through empathic listening, validation of feelings, rapport building, reflection of content and feelings  SFBT through focusing on what positive attributes she has, what positive changes she's made  A: Tasia Medina appeared to be in a euthymic mood with congruent affect, seemed to be experiencing no psychiatric symptoms apparent today, appeared to be kempt, no SI/HI nor GOMEZ risk apparent or reported, seemed to respond well to PCT, SFBT work today  P: Reports she's never felt happier in her life (SFBT discussion is routine, having larger support system, knowing what she wants, being productive/successful at work, keeping her apartment clean)  Says she's going to see her sister over weekend, using tinder, talking to friend from [place]  Consider moving into working on mindfulness together  Check in with continuing to reduce her "shoulds"  CONTINUED: "It's really important that while you are living you find happiness"  "I haven't been living for myself"  "While I'm alive here today, I want to make a positive impact" "I want to get more involved in the community, maybe volunteer somewhere"  Replacing "bad habits" with healthy ones, enjoys getting outside, drawing  Discuss how she can shift focus on appreciating life   Check in with maintaining reduced social media usage to feel better  Believes in reincarnation, that consciousness persists  Check in with using more mindfulness work (aware of deep breathing, grounding, not open to combing music with other tasks)   "I have this idea of perfectionism in my head" "No matter what I do I can never live up to that standard" "Disappointment in myself" Working on small, manageable goals  Behavioral Health Treatment Plan [facility]: Diagnosis and Treatment Plan explained to Michelle Andersen relates understanding diagnosis and is agreeable to Treatment Plan  Yes     Telemedicine consent    Patient: Noah Newman  Provider: Marek Mendoza  Provider located at [address] [place] [id number] [phone]    The patient was identified by name and date of birth  Noah Newman was informed that this is a telemedicine visit and that the visit is being conducted through [facility] and patient was informed this is a secure, HIPAA-complaint platform  She agrees to proceed     My office door was closed  No one else was in the room  She acknowledged consent and understanding of privacy and security of the video platform  The patient has agreed to participate and understands they can discontinue the visit at any time  Patient is aware this is a billable service  I spent 47 minutes with the patient during this visit

## 2022-02-18 ENCOUNTER — TELEPHONE (OUTPATIENT)
Dept: PSYCHIATRY | Facility: CLINIC | Age: 22
End: 2022-02-18

## 2022-02-18 ENCOUNTER — DOCUMENTATION (OUTPATIENT)
Dept: PSYCHIATRY | Facility: CLINIC | Age: 22
End: 2022-02-18

## 2022-02-18 NOTE — TELEPHONE ENCOUNTER
DISCHARGE LETTER for Krysten Mills MD (certified and regular) placed in outgoing mail on 2/18/2022      Article #:  [id number]    Address:  [facility]  [address]  [address] [place] [address]
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
## 2022-02-21 ENCOUNTER — TELEMEDICINE (OUTPATIENT)
Dept: BEHAVIORAL/MENTAL HEALTH CLINIC | Facility: CLINIC | Age: 22
End: 2022-02-21
Payer: COMMERCIAL

## 2022-02-21 DIAGNOSIS — F33.1 MAJOR DEPRESSIVE DISORDER, RECURRENT EPISODE, MODERATE (HCC): Primary | ICD-10-CM

## 2022-02-21 DIAGNOSIS — Z86.59 HISTORY OF POSTTRAUMATIC STRESS DISORDER (PTSD): ICD-10-CM

## 2022-02-21 DIAGNOSIS — F41.1 GENERALIZED ANXIETY DISORDER: ICD-10-CM

## 2022-02-21 PROCEDURE — 90834 PSYTX W PT 45 MINUTES: CPT | Performed by: COUNSELOR

## 2022-02-21 NOTE — PSYCH
Psychotherapy Provided: Individual Psychotherapy 45 minutes     Length of time in session: 45 minutes, follow up in 1 week    Encounter Diagnosis     ICD-10-CM    1  Major depressive disorder, recurrent episode, moderate (HCC)  F33 1    2  History of posttraumatic stress disorder (PTSD)  Z86 59    3  Generalized anxiety disorder  F41 1        Goals addressed in session: Goal 1     Pain:      none    0    Current suicide risk : Low     Time: 10:02am - 10:47am  Previous session plan: Reports she's never felt happier in her life (SFBT discussion is routine, having larger support system, knowing what she wants, being productive/successful at work, keeping her apartment clean)  Says she's going to see her sister over weekend, using tinder, talking to friend from [place]  Consider moving into working on mindfulness together  Check in with continuing to reduce her "shoulds"  CONTINUED: "It's really important that while you are living you find happiness"  "I haven't been living for myself"  "While I'm alive here today, I want to make a positive impact" "I want to get more involved in the community, maybe volunteer somewhere"  Replacing "bad habits" with healthy ones, enjoys getting outside, drawing  Discuss how she can shift focus on appreciating life  Check in with maintaining reduced social media usage to feel better  Believes in reincarnation, that consciousness persists  Check in with using more mindfulness work (aware of deep breathing, grounding, not open to combing music with other tasks)   "I have this idea of perfectionism in my head" "No matter what I do I can never live up to that standard" "Disappointment in myself" Working on small, manageable goals  D: She says she had a quiet weekend, began painting on a project of hers  She says she has been painting to hang canvas on her back wall  Says she received a noise complaint because she was moving things around her apartment with her sister  "It was really fun"  Says she had a noise complaint  Says she made cookies and gave out business cards with her number to contact her regarding her dog barking  She says she is getting her raise in March and getting 5 days of PTO  Says she had a wonderful conversation with her friends in which she felt much less alone, less isolated  Recognizing that she needs to work on her relationships to maintain them, recognizing that her isolation and lack of intimacy was harming her ability to maintain a support system  She says that she has been able to live for herself in the past month, focusing on really living her life  This writer provided PCT, SFBT through exception seeking questions, reinforcing engagement with family, discussion about her cleaning/organizing, painting project, etc, empathic listening, validation of feelings, reflection of content and feelings  CBT through socratic dialogue  ET checking in on "living for herself"  A: Zahra Ayala appeared to be in a euthymic mood with congruent affect, seemed to be experiencing lower mood at times, mild irritability, mild thoughts of hopelessness and worthlessness, appeared to be kempt, no SI/HI nor GOMEZ risk apparent or reported, seemed to respond well to PCT, SFBT, CBT work  P: Consider treatment plan update  Check in with acrylic painting project, painting large 3d flowers  "I've kind of stopped comparing myself to others"  "[place] isn't something you have, it needs constant effort"  CONTINUED: Reports she's never felt happier in her life (SFBT discussion is routine, having larger support system, knowing what she wants, being productive/successful at work, keeping her apartment clean)  Consider moving into working on mindfulness together  Check in with continuing to reduce her "shoulds"   "It's really important that while you are living you find happiness"  "I haven't been living for myself"   "While I'm alive here today, I want to make a positive impact" "I want to get more involved in the community, maybe volunteer somewhere"  Discuss how she can shift focus on appreciating life  Believes in reincarnation, that consciousness persists  Check in with using more mindfulness work (aware of deep breathing, grounding, not open to combing music with other tasks)   "I have this idea of perfectionism in my head" "No matter what I do I can never live up to that standard" "Disappointment in myself" Working on small, manageable goals  Behavioral Health Treatment Plan [facility]: Diagnosis and Treatment Plan explained to Vladimir Hays relates understanding diagnosis and is agreeable to Treatment Plan  Yes     Telemedicine consent    Patient: Amanda Bailon  Provider: Marcin Juarez  Provider located at [address] [place] [id number] [phone]    The patient was identified by name and date of birth  Amanda Bailon was informed that this is a telemedicine visit and that the visit is being conducted through [address] and patient was informed this is a secure, HIPAA-complaint platform  She agrees to proceed     My office door was closed  No one else was in the room  She acknowledged consent and understanding of privacy and security of the video platform  The patient has agreed to participate and understands they can discontinue the visit at any time  Patient is aware this is a billable service  I spent 45 minutes with the patient during this visit

## 2022-02-24 ENCOUNTER — OFFICE VISIT (OUTPATIENT)
Dept: OBGYN CLINIC | Facility: CLINIC | Age: 22
End: 2022-02-24
Payer: COMMERCIAL

## 2022-02-24 VITALS
HEIGHT: 64 IN | BODY MASS INDEX: 25.78 KG/M2 | SYSTOLIC BLOOD PRESSURE: 110 MMHG | DIASTOLIC BLOOD PRESSURE: 70 MMHG | WEIGHT: 151 LBS

## 2022-02-24 DIAGNOSIS — Z30.431 IUD CHECK UP: Primary | ICD-10-CM

## 2022-02-24 PROCEDURE — 99213 OFFICE O/P EST LOW 20 MIN: CPT | Performed by: NURSE PRACTITIONER

## 2022-02-24 NOTE — PROGRESS NOTES
Subjective      Meg Odell is a 24 y o  female who presents for IUD f/u  Paragard was inserted on 22  The patient has no complaints today  She is doing well with the paragard  Pertinent past medical history: none  Menstrual History:  OB History        0    Para   0    Term   0       0    AB   0    Living   0       SAB   0    IAB   0    Ectopic   0    Multiple   0    Live Births   0               Patient's last menstrual period was 2022  The following portions of the patient's history were reviewed and updated as appropriate: allergies, current medications, past family history, past medical history, past social history, past surgical history and problem list     Review of Systems  Pertinent items are noted in HPI  Objective      /70   Ht 5' 4" (1 626 m)   Wt 68 5 kg (151 lb)   LMP 2022   BMI 25 92 kg/m²     Physical Exam  Constitutional:       Appearance: Normal appearance  Genitourinary:      Genitourinary Comments: IUD noted to be in proper position with bedside transabdominal US  Right Labia: No rash, tenderness, lesions, skin changes or Bartholin's cyst      Left Labia: No tenderness, lesions, skin changes, Bartholin's cyst or rash  No labial fusion noted  No vaginal discharge  Cervix is nulliparous  No cervical friability or lesion  IUD strings visualized  HENT:      Head: Normocephalic and atraumatic  Musculoskeletal:      Cervical back: Neck supple  Neurological:      Mental Status: She is alert  Skin:     General: Skin is warm  Psychiatric:         Behavior: Behavior is cooperative  Vitals and nursing note reviewed  Assessment     24 y o , continuing IUD, no contraindications       Plan     Follow-up for annual visit

## 2022-02-28 ENCOUNTER — ANNUAL EXAM (OUTPATIENT)
Dept: OBGYN CLINIC | Facility: CLINIC | Age: 22
End: 2022-02-28
Payer: COMMERCIAL

## 2022-02-28 VITALS
DIASTOLIC BLOOD PRESSURE: 80 MMHG | HEIGHT: 64 IN | BODY MASS INDEX: 25.61 KG/M2 | SYSTOLIC BLOOD PRESSURE: 120 MMHG | WEIGHT: 150 LBS

## 2022-02-28 DIAGNOSIS — Z11.3 SCREENING EXAMINATION FOR STD (SEXUALLY TRANSMITTED DISEASE): ICD-10-CM

## 2022-02-28 DIAGNOSIS — Z01.419 WOMEN'S ANNUAL ROUTINE GYNECOLOGICAL EXAMINATION: Primary | ICD-10-CM

## 2022-02-28 PROCEDURE — 3008F BODY MASS INDEX DOCD: CPT | Performed by: FAMILY MEDICINE

## 2022-02-28 PROCEDURE — 99395 PREV VISIT EST AGE 18-39: CPT | Performed by: NURSE PRACTITIONER

## 2022-02-28 PROCEDURE — 87491 CHLMYD TRACH DNA AMP PROBE: CPT | Performed by: NURSE PRACTITIONER

## 2022-02-28 PROCEDURE — 87591 N.GONORRHOEAE DNA AMP PROB: CPT | Performed by: NURSE PRACTITIONER

## 2022-02-28 NOTE — PROGRESS NOTES
Subjective    HPI:     Tessa Benitez is a 24 y o  nulliparous female  Her menstrual cycles are normal monthl  Her current method of contraception includes paragard IUD  She denies issues with intimacy  Desires STD screening  She denies /GI and Gyn complaints  She feels safe at home  She denies depression/anxiety  Medical, surgical and family history reviewed  Her dental care is up-to-date  She eats a healthy diet and exercises  She is happy with her weight  Gynecologic History    Patient's last menstrual period was 2022  Gardasil Vaccine Series: completed  Last Pap: 3/20/2020  Results were: normal    Obstetric History    OB History    Para Term  AB Living   0 0 0 0 0 0   SAB IAB Ectopic Multiple Live Births   0 0 0 0 0       The following portions of the patient's history were reviewed and updated as appropriate: allergies, current medications, past family history, past medical history, past social history, past surgical history and problem list     Review of Systems    Pertinent items are noted in HPI  Objective    Physical Exam  Constitutional:       Appearance: Normal appearance  She is well-developed  Genitourinary:      Vulva, bladder and urethral meatus normal       No lesions in the vagina  Right Labia: No rash, tenderness, lesions, skin changes or Bartholin's cyst      Left Labia: No tenderness, lesions, skin changes, Bartholin's cyst or rash  No labial fusion noted  No inguinal adenopathy present in the right or left side  No vaginal discharge, erythema, tenderness, bleeding or granulation tissue  No vaginal prolapse present  No vaginal atrophy present  Right Adnexa: not tender, not full and no mass present  Left Adnexa: not tender, not full and no mass present  Cervix is nulliparous  No cervical motion tenderness, discharge, friability, lesion, polyp or nabothian cyst       IUD strings visualized        Uterus is not enlarged, tender, irregular or prolapsed  No uterine mass detected  Uterus is anteverted  Pelvic exam was performed with patient in the lithotomy position  Breasts: Breasts are symmetrical       Right: No inverted nipple, mass, nipple discharge, skin change, tenderness, axillary adenopathy or supraclavicular adenopathy  Left: No inverted nipple, mass, nipple discharge, skin change, tenderness, axillary adenopathy or supraclavicular adenopathy  HENT:      Head: Normocephalic and atraumatic  Neck:      Thyroid: No thyromegaly  Cardiovascular:      Rate and Rhythm: Normal rate and regular rhythm  Heart sounds: Normal heart sounds, S1 normal and S2 normal    Pulmonary:      Effort: Pulmonary effort is normal       Breath sounds: Normal breath sounds  Abdominal:      General: Bowel sounds are normal  There is no distension  Palpations: Abdomen is soft  There is no mass  Tenderness: There is no abdominal tenderness  There is no guarding  Hernia: There is no hernia in the left inguinal area or right inguinal area  Musculoskeletal:      Cervical back: Neck supple  Lymphadenopathy:      Cervical: No cervical adenopathy  Upper Body:      Right upper body: No supraclavicular or axillary adenopathy  Left upper body: No supraclavicular or axillary adenopathy  Lower Body: No right inguinal adenopathy  No left inguinal adenopathy  Neurological:      Mental Status: She is alert  Skin:     General: Skin is warm and dry  Findings: No rash  Psychiatric:         Attention and Perception: Attention and perception normal          Mood and Affect: Mood and affect normal          Speech: Speech normal          Behavior: Behavior is cooperative  Thought Content: Thought content normal          Cognition and Memory: Cognition and memory normal          Judgment: Judgment normal    Vitals and nursing note reviewed            Assessment and Plan    Radha Washington was seen today for annual exam     Diagnoses and all orders for this visit:    Women's annual routine gynecological examination    Screening examination for STD (sexually transmitted disease)      Patient informed of a Stable GYN exam  A pap smear was not performed due to normal pap in 2020  I have discussed the importance of exercise and healthy diet as well as adequate intake of calcium and vitamin D  The current ASCCP guidelines were reviewed  The low risk patient will receive pap smear screening every 3 years until the age of 34 and then every 3 to 5 years with HPV co-testing from the ages of 33-67  I emphasized the importance of an annual pelvic and breast exam  A yearly mammogram is recommended for breast cancer screening starting at age 36  Results will be released to [facility], if abnormal will call to review and discuss treatment plan  All questions have been answered to her satisfaction  Contraception: IUD  Follow up in: 1 year

## 2022-02-28 NOTE — ADDENDUM NOTE
Addended by: [facility] on: 2/28/2022 12:16 PM     Modules accepted: Orders

## 2022-03-01 LAB
C TRACH DNA SPEC QL NAA+PROBE: NEGATIVE
N GONORRHOEA DNA SPEC QL NAA+PROBE: NEGATIVE

## 2022-03-07 NOTE — TELEPHONE ENCOUNTER
Certificate for the Discharge Letter was signed/received in February 2022  A copy has been scanned into Media

## 2022-03-18 DIAGNOSIS — E03.9 PRIMARY HYPOTHYROIDISM: ICD-10-CM

## 2022-03-18 RX ORDER — LEVOTHYROXINE SODIUM 0.05 MG/1
50 TABLET ORAL DAILY
Qty: 90 TABLET | Refills: 1 | Status: SHIPPED | OUTPATIENT
Start: 2022-03-18

## 2022-03-22 ENCOUNTER — TELEMEDICINE (OUTPATIENT)
Dept: BEHAVIORAL/MENTAL HEALTH CLINIC | Facility: CLINIC | Age: 22
End: 2022-03-22
Payer: COMMERCIAL

## 2022-03-22 DIAGNOSIS — Z86.59 HISTORY OF POSTTRAUMATIC STRESS DISORDER (PTSD): ICD-10-CM

## 2022-03-22 DIAGNOSIS — F33.1 MAJOR DEPRESSIVE DISORDER, RECURRENT EPISODE, MODERATE (HCC): Primary | ICD-10-CM

## 2022-03-22 PROCEDURE — 90834 PSYTX W PT 45 MINUTES: CPT | Performed by: COUNSELOR

## 2022-03-22 NOTE — PSYCH
Psychotherapy Provided: Individual Psychotherapy 45 minutes     Length of time in session: 45 minutes, follow up in 1 week    Encounter Diagnosis     ICD-10-CM    1  Major depressive disorder, recurrent episode, moderate (HCC)  F33 1    2  History of posttraumatic stress disorder (PTSD)  Z86 59        Goals addressed in session: Goal 1     Pain:      none    0    Current suicide risk : Low     Time: 10:05am - 10:50am  Previous session plan: Consider treatment plan update  Check in with acrylic painting project, painting large 3d flowers  "I've kind of stopped comparing myself to others"  "[place] isn't something you have, it needs constant effort"  CONTINUED: Reports she's never felt happier in her life (SFBT discussion is routine, having larger support system, knowing what she wants, being productive/successful at work, keeping her apartment clean)  Consider moving into working on mindfulness together  Check in with continuing to reduce her "shoulds"   "It's really important that while you are living you find happiness"  "I haven't been living for myself"  "While I'm alive here today, I want to make a positive impact" "I want to get more involved in the community, maybe volunteer somewhere"  Discuss how she can shift focus on appreciating life  Believes in reincarnation, that consciousness persists  Check in with using more mindfulness work (aware of deep breathing, grounding, not open to combing music with other tasks)   "I have this idea of perfectionism in my head" "No matter what I do I can never live up to that standard" "Disappointment in myself" Working on small, manageable goals  D: She says that she is angry with her father because he has been treating his own body so poorly, had a stroke recently  Says she had a "really good time" for her birthday with friends  She says she has been going into work, which has made her scheduling with this writer difficult   She says she's been going out with friends, socializing and enjoying her time with others  She says her work has been challenging in the past few weeks  Says she has a meeting soon with the  of the company for a presentation for her to make to him - has been stressed about this  "I still feel really good" regarding her overall mood  "I'm really happy to have a good friend, Christa Dunaway", says she's spending time with TJ still  Will see her ex-boyfriend in April  This writer provided CBT through psychoeducation, socratic dialogue, normalization, PCT through empathic listening, validation of feelings, reflection of content and feelings  A: Ledy Luis appeared to be in a euthymic mood with congruent affect, seemed to be experiencing mild racing thoughts and excessive worry at times, otherwise thoughts seem to be WNL and hopeful today, appeared to be kempt, no SI/HI nor GOMEZ risk apparent or reported, seemed to respond well to PCT, CBT work today  P: "I would like to talk more about my dad"  Consider focusing on mindfulness for concentration, sleep hygiene to reduce PHQ-9 score  CONTINUED: "I've kind of stopped comparing myself to others"  "[place] isn't something you have, it needs constant effort"  Reports she's never felt happier in her life (SFBT discussion is routine, having larger support system, knowing what she wants, being productive/successful at work, keeping her apartment clean)  Consider moving into working on mindfulness together  Check in with continuing to reduce her "shoulds"   "It's really important that while you are living you find happiness"  "I haven't been living for myself"  "While I'm alive here today, I want to make a positive impact" "I want to get more involved in the community, maybe volunteer somewhere"  Discuss how she can shift focus on appreciating life   Believes in reincarnation, that consciousness persists  Check in with using more mindfulness work (aware of deep breathing, grounding, not open to combing music with other tasks)  Behavioral Health Treatment Plan [facility]: Diagnosis and Treatment Plan explained to Elijahjosue Culp relates understanding diagnosis and is agreeable to Treatment Plan  Yes     Telemedicine consent    Patient: Carrol Rodriguez  Provider: Merlin Sovereign  Provider located at [address] [place] [id number] [phone]    The patient was identified by name and date of birth  Carrol Rodriguez was informed that this is a telemedicine visit and that the visit is being conducted through [facility] and patient was informed this is a secure, HIPAA-complaint platform  She agrees to proceed     My office door was closed  No one else was in the room  She acknowledged consent and understanding of privacy and security of the video platform  The patient has agreed to participate and understands they can discontinue the visit at any time  Patient is aware this is a billable service  I spent 45 minutes with the patient during this visit

## 2022-04-04 ENCOUNTER — TELEMEDICINE (OUTPATIENT)
Dept: BEHAVIORAL/MENTAL HEALTH CLINIC | Facility: CLINIC | Age: 22
End: 2022-04-04
Payer: COMMERCIAL

## 2022-04-04 DIAGNOSIS — F33.1 MAJOR DEPRESSIVE DISORDER, RECURRENT EPISODE, MODERATE (HCC): Primary | ICD-10-CM

## 2022-04-04 DIAGNOSIS — F41.1 GENERALIZED ANXIETY DISORDER: ICD-10-CM

## 2022-04-04 DIAGNOSIS — Z86.59 HISTORY OF POSTTRAUMATIC STRESS DISORDER (PTSD): ICD-10-CM

## 2022-04-04 PROCEDURE — 90834 PSYTX W PT 45 MINUTES: CPT | Performed by: COUNSELOR

## 2022-04-04 NOTE — PSYCH
Psychotherapy Provided: Individual Psychotherapy 45 minutes     Length of time in session: 54 minutes, follow up in 1 week    Encounter Diagnosis     ICD-10-CM    1  Major depressive disorder, recurrent episode, moderate (HCC)  F33 1    2  History of posttraumatic stress disorder (PTSD)  Z86 59    3  Generalized anxiety disorder  F41 1        Goals addressed in session: Goal 1     Pain:      none    0    Current suicide risk : Low     Time: 10:01am - 10:55am  Previous session plan: "I would like to talk more about my dad"  Consider focusing on mindfulness for concentration, sleep hygiene to reduce PHQ-9 score  CONTINUED: "I've kind of stopped comparing myself to others"  "[place] isn't something you have, it needs constant effort"  Reports she's never felt happier in her life (SFBT discussion is routine, having larger support system, knowing what she wants, being productive/successful at work, keeping her apartment clean)  Consider moving into working on mindfulness together  Check in with continuing to reduce her "shoulds"   "It's really important that while you are living you find happiness"  "I haven't been living for myself"  "While I'm alive here today, I want to make a positive impact" "I want to get more involved in the community, maybe volunteer somewhere"  Discuss how she can shift focus on appreciating life  Believes in reincarnation, that consciousness persists  Check in with using more mindfulness work (aware of deep breathing, grounding, not open to combing music with other tasks)  D: She says that she has had a spending problem recently, going out with friends too often and finding that  She says she's making new friends and enjoying her time with her friends going out  She says that she's been spending time with TJ often and enjoying her time with   Says she did put it out there to [facility] that she'd like to try again for a relationship, but he turned her down saying he'd like to focus on school and not date  This is upsetting to her, cries discussing it  She says her ex, Kurt, who she really enjoyed time with, is coming to visit in about two weeks  "I used to idolize him" regarding her father, but she says no she thinks "he's an asshole" "anything he does for you he expects in return"  "My sex talk was if you have sex people are going to call you a whore" "He would vent to me" about sex in relationships when she was 7, 5  She says she remembers him watching porn while driving her around as a child  "I realized I don't like him anymore, as a person"  Discussed the breakup  CBT provided through psychoeducation, reframing, socratic dialogue  This writer provided PCT through reflection of content and feelings, validation of feelings, empathic listening  A: Dell Apley appeared to be in a mildly euthymic mood with congruent affect, seemed to be experiencing lower mood at times, mild thoughts of hopelessness and worthlessness, excessive worry, mild impulsive behavior, appeared to be kempt, no SI/HI nor GOMEZ risk apparent or reported, seemed to respond well to PCT, CBT work today  P: "I don't want to be angry" - cries saying this about being like her father, goes "0-50" rather than 0-100  "The ick" is the feeling she has that reminds her of physical affection, or reminded about her father, affection doesn't feel right due to how her father treated her, having to hug him after he picked out a hair from her head  Father treated her like "if I do something for you then you owe me", had inappropriate sexual discussions  "It was the only way I felt wanted" in adolescence to have sex, "that's what 'love' is", lasted for a couple years  "Objectively I know I am a successful person" but she says it doesn't make sense to her as to how/why she's here in her life "I feel like I don't deserve to be happy"  Check in with only spending cash to help curb her spending   Check in with spending time with her ex, Kurt, upcoming around [place]  Consider focusing on mindfulness for concentration, sleep hygiene to reduce PHQ-9 score  "While I'm alive here today, I want to make a positive impact" "I want to get more involved in the community, maybe volunteer somewhere"  Believes in reincarnation, that consciousness persists  Check in with using more mindfulness work (aware of deep breathing, grounding, not open to combing music with other tasks)  Telemedicine consent    Patient: Felix Mas  Provider: Luis Shea  Provider located at [address] [place] [id number] [phone]    The patient was identified by name and date of birth  Felix Mas was informed that this is a telemedicine visit and that the visit is being conducted through [facility] and patient was informed this is a secure, HIPAA-complaint platform  She agrees to proceed     My office door was closed  No one else was in the room  She acknowledged consent and understanding of privacy and security of the video platform  The patient has agreed to participate and understands they can discontinue the visit at any time  Patient is aware this is a billable service  I spent 54 minutes with the patient during this visit  Behavioral Health Treatment Plan [facility]: Diagnosis and Treatment Plan explained to Eber Jean relates understanding diagnosis and is agreeable to Treatment Plan   Yes

## 2022-04-18 ENCOUNTER — TELEMEDICINE (OUTPATIENT)
Dept: BEHAVIORAL/MENTAL HEALTH CLINIC | Facility: CLINIC | Age: 22
End: 2022-04-18
Payer: COMMERCIAL

## 2022-04-18 DIAGNOSIS — F33.1 MAJOR DEPRESSIVE DISORDER, RECURRENT EPISODE, MODERATE (HCC): Primary | ICD-10-CM

## 2022-04-18 PROCEDURE — 90834 PSYTX W PT 45 MINUTES: CPT | Performed by: COUNSELOR

## 2022-04-18 NOTE — PSYCH
Psychotherapy Provided: Individual Psychotherapy 45 minutes     Length of time in session: 55 minutes, follow up in 1 week    Encounter Diagnosis     ICD-10-CM    1  Major depressive disorder, recurrent episode, moderate (HCC)  F33 1        Goals addressed in session: Goal 1     Pain:      none    0    Current suicide risk : Low     Time: 10:00am - 10:55am  Previous session plan:  "I don't want to be angry" - cries saying this about being like her father, goes "0-50" rather than 0-100  "The ick" is the feeling she has that reminds her of physical affection, or reminded about her father, affection doesn't feel right due to how her father treated her, having to hug him after he picked out a hair from her head  Father treated her like "if I do something for you then you owe me", had inappropriate sexual discussions  "It was the only way I felt wanted" in adolescence to have sex, "that's what 'love' is", lasted for a couple years  "Objectively I know I am a successful person" but she says it doesn't make sense to her as to how/why she's here in her life "I feel like I don't deserve to be happy"  Check in with only spending cash to help curb her spending  Check in with spending time with her ex, Kurt, upcoming around [facility]  Consider focusing on mindfulness for concentration, sleep hygiene to reduce PHQ-9 score  "While I'm alive here today, I want to make a positive impact" "I want to get more involved in the community, maybe volunteer somewhere"  Believes in reincarnation, that consciousness persists  Check in with using more mindfulness work (aware of deep breathing, grounding, not open to combing music with other tasks)  D: She says that work has been going well  Says she's still going in on Tuesdays and Wednesdays, doing some half days  She says that they are working on finding her a role at [employer] rather than being a temp  She says she did spent time with some friends that she hadn't seen in quite a while   She says that she was out hiking, walked about 8 miles  She says that many of the individuals were people who she thought didn't like her  Says she didn't really enjoy time with her ex boyfriend Kurt  Followed up with her regarding her father and his behavior  Explored her feeling regarding physicality, she says she doesn't feel that way with TJ  Focused on how she does not "owe" anything to her father  She says she feels she's getting better with her anger - that it stems from frustration often  Dream work done today, discussed her recent dream about ominous box that was closed, did not want to open a box  She says she cannot remember 10- 8years old of her life  This writer provided PCT through empathic listening, validation of feelings, reflection of content and feelings  CBT work through reframing, socratic dialogue  A: Joanne Castro appeared to be in a euthymic mood with congruent affect, seemed to be experiencing lower mood at times, mild irritability, anxious distress, appeared to be kempt, no SI/HI nor GOMEZ risk apparent or reported, seemed to respond well to PCT, CBT today  P: Check in with her applying for non-temp positions at [employer]  Check in with increased exercise, training her dog differently to reduce her frustration  "I don't want to be angry" - cries saying this about being like her father  She says she does not remember 6 - 10  She says she remembers her mother trying to kill herself  Relates disgust with physical affection, or reminded about her father, affection doesn't feel right due to how her father treated her, having to hug him after he picked out a hair from her head  Father treated her like "if I do something for you then you owe me", had inappropriate sexual discussions, complimented her when she was 11  "It was the only way I felt wanted" in adolescence to have sex, "that's what 'love' is", lasted for a couple years   "Objectively I know I am a successful person" but she says it doesn't make sense to her as to how/why she's here in her life "I feel like I don't deserve to be happy"  Consider focusing on mindfulness for concentration, sleep hygiene to reduce PHQ-9 score  "While I'm alive here today, I want to make a positive impact" "I want to get more involved in the community, maybe volunteer somewhere"  Believes in reincarnation, that consciousness persists  Check in with using more mindfulness work (aware of deep breathing, grounding, not open to combing music with other tasks)  Telemedicine consent    Patient: Gage Comment  Provider: Chris Mcdermott  Provider located at [address] [place] [id number] [phone]    The patient was identified by name and date of birth  Gage Comment was informed that this is a telemedicine visit and that the visit is being conducted through [address] and patient was informed this is a secure, HIPAA-complaint platform  She agrees to proceed     My office door was closed  No one else was in the room  She acknowledged consent and understanding of privacy and security of the video platform  The patient has agreed to participate and understands they can discontinue the visit at any time  Patient is aware this is a billable service  I spent 55 minutes with the patient during this visit  Behavioral Health Treatment Plan [facility]: Diagnosis and Treatment Plan explained to Valerie Anand relates understanding diagnosis and is agreeable to Treatment Plan   Yes

## 2022-04-27 ENCOUNTER — TELEPHONE (OUTPATIENT)
Dept: PSYCHIATRY | Facility: CLINIC | Age: 22
End: 2022-04-27

## 2022-04-30 ENCOUNTER — IMMUNIZATIONS (OUTPATIENT)
Dept: FAMILY MEDICINE CLINIC | Facility: HOSPITAL | Age: 22
End: 2022-04-30

## 2022-04-30 PROCEDURE — 91305 COVID-19 PFIZER VACC TRIS-SUCROSE GRAY CAP 0.3 ML: CPT

## 2022-04-30 PROCEDURE — 0054A COVID-19 PFIZER VACC TRIS-SUCROSE GRAY CAP 0.3 ML: CPT

## 2022-05-04 ENCOUNTER — TELEMEDICINE (OUTPATIENT)
Dept: BEHAVIORAL/MENTAL HEALTH CLINIC | Facility: CLINIC | Age: 22
End: 2022-05-04
Payer: COMMERCIAL

## 2022-05-04 DIAGNOSIS — F41.1 GENERALIZED ANXIETY DISORDER: ICD-10-CM

## 2022-05-04 DIAGNOSIS — Z86.59 HISTORY OF POSTTRAUMATIC STRESS DISORDER (PTSD): ICD-10-CM

## 2022-05-04 DIAGNOSIS — F33.1 MAJOR DEPRESSIVE DISORDER, RECURRENT EPISODE, MODERATE (HCC): Primary | ICD-10-CM

## 2022-05-04 PROCEDURE — 90834 PSYTX W PT 45 MINUTES: CPT | Performed by: COUNSELOR

## 2022-05-04 NOTE — PSYCH
Virtual Regular Visit    Verification of patient location:    Patient is located in the following state in which I hold an active license [place]      Assessment/Plan:    Problem List Items Addressed This Visit     None          Goals addressed in session: Goal 1          Reason for visit is   Chief Complaint   Patient presents with    Virtual Regular Visit        Encounter provider Eitan Sanchez    Provider located at [address] [place] [id number] [phone]      Recent Visits  Date Type Provider Dept   04/27/22 Telephone [address] recent visits within past 7 days and meeting all other requirements  Future Appointments  No visits were found meeting these conditions  Showing future appointments within next 150 days and meeting all other requirements       The patient was identified by name and date of birth  Belinda Cain was informed that this is a telemedicine visit and that the visit is being conducted through[employer] Embedded and patient was informed this is a secure, HIPAA-complaint platform  She agrees to proceed     My office door was closed  No one else was in the room  She acknowledged consent and understanding of privacy and security of the video platform  The patient has agreed to participate and understands they can discontinue the visit at any time  Patient is aware this is a billable service  Subjective  Belinda Cain is a 20 y o  female     Time: 1:04pm - 1:58pm  Previous session plan: Check in with her applying for non-temp positions at [address]  Check in with increased exercise, training her dog differently to reduce her frustration  "I don't want to be angry" - cries saying this about being like her father  She says she does not remember 6 - 10  She says she remembers her mother trying to kill herself   Relates disgust with physical affection, or reminded about her father, affection doesn't feel right due to how her father treated her, having to hug him after he picked out a hair from her head  Father treated her like "if I do something for you then you owe me", had inappropriate sexual discussions, complimented her when she was 11  "It was the only way I felt wanted" in adolescence to have sex, "that's what 'love' is", lasted for a couple years  "Objectively I know I am a successful person" but she says it doesn't make sense to her as to how/why she's here in her life "I feel like I don't deserve to be happy"  Consider focusing on mindfulness for concentration, sleep hygiene to reduce PHQ-9 score  "While I'm alive here today, I want to make a positive impact" "I want to get more involved in the community, maybe volunteer somewhere"  Believes in reincarnation, that consciousness persists  Check in with using more mindfulness work (aware of deep breathing, grounding, not open to combing music with other tasks)  D: She says she has had a difficult past two weeks  She says she asked TJ where they stood and told him she loves him - she says he told her that he doesn't love her anymore  This was painful for her to hear  Discussed her expectation regarding her ex-boyfriend and having broken up with him, what might happen afterward  She says that she is taking her dog to get a tumor removed from her face  She says she is considering giving up her dog because she is concerned she will not be able to care for her  She says she did apply to a position at [employer] that will be full time   This writer provided PCT, ET, CBT through focus on grief, empathic listening, discussion about loss, validation of feelings, reflection of content and feelings  A: Ladan Ley appeared to be in a mildly euthymic mood with congruent affect, seemed to be experiencing lower mood at times, excessive guilt, thoughts of worthlessness at times, excessive worry, racing thoughts at times, appeared to be kempt, no SI/HI nor GOMEZ risk apparent or reported, seemed to respond well to PCT, CBT, ET work  P: Says she's realizing that parents taught her to not reach out to others for help, tried to keep things quiet within family  Check in with her applying for non-temp positions at [employer]  "I don't want to be angry" - cries saying this about being like her father  She says she does not remember 6 - 10  She says she remembers her mother trying to kill herself  Relates disgust with physical affection, or reminded about her father, affection doesn't feel right due to how her father treated her, having to hug him after he picked out a hair from her head  Father treated her like "if I do something for you then you owe me", had inappropriate sexual discussions, complimented her when she was 11  "It was the only way I felt wanted" in adolescence to have sex, "that's what 'love' is", lasted for a couple years  "Objectively I know I am a successful person" but she says it doesn't make sense to her as to how/why she's here in her life "I feel like I don't deserve to be happy"  Consider focusing on mindfulness for concentration, sleep hygiene to reduce PHQ-9 score  Believes in reincarnation, that consciousness persists  Check in with using more mindfulness work (aware of deep breathing, grounding)           HPI     Past Medical History:   Diagnosis Date    Acne     ADHD (attention deficit hyperactivity disorder)     Borderline personality disorder ([facility] )     Depression     Fracture of carpal bones     Hypothyroidism     Hypothyroidism (acquired)     PTSD (post-traumatic stress disorder)     Suicide and self-inflicted injury ([facility] ) 37/7143    Suicide attempt [facility])        Past Surgical History:   Procedure Laterality Date    WISDOM TOOTH EXTRACTION         Current Outpatient Medications   Medication Sig Dispense Refill    albuterol (PROVENTIL HFA,VENTOLIN HFA) 90 mcg/act inhaler TAKE 2 PUFFS BY MOUTH EVERY 6 HOURS AS NEEDED FOR WHEEZE OR FOR SHORTNESS OF BREATH 6 7 g 5    fluticasone (FLONASE) 50 mcg/act nasal spray 1 spray into each nostril daily 1 Bottle 2    levothyroxine 50 mcg tablet Take 1 tablet (50 mcg total) by mouth daily 90 tablet 1    PARAGARD INTRAUTERINE COPPER IU 1 Device by Intrauterine route continuous       No current facility-administered medications for this visit  No Known Allergies    Review of Systems    Video Exam    There were no vitals filed for this visit  Physical Exam     I spent 54 minutes directly with the patient during this visit    VIRTUAL VISIT [facility] verbally agrees to participate in [employer]  Pt is aware that [employer] could be limited without vital signs or the ability to perform a full hands-on physical exam  Doris Burrell understands she or the provider may request at any time to terminate the video visit and request the patient to seek care or treatment in person

## 2022-05-13 ENCOUNTER — TELEMEDICINE (OUTPATIENT)
Dept: BEHAVIORAL/MENTAL HEALTH CLINIC | Facility: CLINIC | Age: 22
End: 2022-05-13
Payer: COMMERCIAL

## 2022-05-13 DIAGNOSIS — F33.1 MAJOR DEPRESSIVE DISORDER, RECURRENT EPISODE, MODERATE (HCC): Primary | ICD-10-CM

## 2022-05-13 DIAGNOSIS — Z86.59 HISTORY OF POSTTRAUMATIC STRESS DISORDER (PTSD): ICD-10-CM

## 2022-05-13 PROCEDURE — 90834 PSYTX W PT 45 MINUTES: CPT | Performed by: COUNSELOR

## 2022-05-13 NOTE — PSYCH
Virtual Regular Visit    Verification of patient location:    Patient is located in the following state in which I hold an active license [place]      Assessment/Plan:    Problem List Items Addressed This Visit    None     Visit Diagnoses     Major depressive disorder, recurrent episode, moderate ([facility] )    -  Primary    History of posttraumatic stress disorder (PTSD)              Goals addressed in session: Goal 1          Reason for visit is   Chief Complaint   Patient presents with    Virtual Regular Visit        Encounter provider Basia Posada    Provider located at [address] [place] [id number] [phone]      Recent Visits  No visits were found meeting these conditions  Showing recent visits within past 7 days and meeting all other requirements  Today's Visits  Date Type Provider Dept   05/13/22 [address] [place] [address] today's visits and meeting all other requirements  Future Appointments  No visits were found meeting these conditions  Showing future appointments within next 150 days and meeting all other requirements       The patient was identified by name and date of birth  Celi Renner was informed that this is a telemedicine visit and that the visit is being conducted through  Secret and patient was informed this is a secure, HIPAA-complaint platform  She agrees to proceed     My office door was closed  No one else was in the room  She acknowledged consent and understanding of privacy and security of the video platform  The patient has agreed to participate and understands they can discontinue the visit at any time  Patient is aware this is a billable service       Subjective  Celi Renner is a 20 y o  female     Time: 1:06pm - 2:00pm   Previous session plan: Says she's realizing that parents taught her to not reach out to others for help, tried to keep things quiet within family  Check in with her applying for non-temp positions at [employer]  "I don't want to be angry" - cries saying this about being like her father  She says she does not remember 6 - 10  She says she remembers her mother trying to kill herself  Relates disgust with physical affection, or reminded about her father, affection doesn't feel right due to how her father treated her, having to hug him after he picked out a hair from her head  Father treated her like "if I do something for you then you owe me", had inappropriate sexual discussions, complimented her when she was 11  "It was the only way I felt wanted" in adolescence to have sex, "that's what 'love' is", lasted for a couple years  "Objectively I know I am a successful person" but she says it doesn't make sense to her as to how/why she's here in her life "I feel like I don't deserve to be happy"  Consider focusing on mindfulness for concentration, sleep hygiene to reduce PHQ-9 score  Believes in reincarnation, that consciousness persists  Check in with using more mindfulness work (aware of deep breathing, grounding)  D: She says that she talked to [facility] and he told her that he was sorry, that he still loves her and wants to get back together with her  She says that they are committing to one another for the time being, but considering this a relationship break  She says that she did have a solutions-focused conversation with him about what they will do differently moving forward  "Some things that I can do better are     I'm really impatient, I get anxious"  Focused with Verónica Steve on her communication styles, discussed "I" statements and how she can effectively communicate with her partner TJ, others  Focused on emotional intimacy today, how to relate to the other, empathy  Socratic dialogue employed    This writer provided PCT, SFBT, ET work through discussing empathic listening, validation of feelings, reflection of content and feelings, meaningful activity, identity, psychoeducation, exception seeking questions  A: Valeria Comer appeared to be in a euthymic mood with congruent affect, seemed to be experiencing lower mood at times, mild thoughts of hopelessness and worthlessness, mild lethargy, avolition appeared to be kempt, no SI/HI nor GOMEZ risk apparent or reported, seemed to respond well to PCT, SFBT, ET work  P: Consider discussion about bipolar disorder/borderline personality disorder  Check in with considering the art of happiness, utilizing "I" statements when talking to [facility]  "I would be a lot less angry", less jealous if she could let go of the judgement of others for having it "easier" than her growing up (Consider CPT socratic questioning)  CONTINUED: Says she's realizing that parents taught her to not reach out to others for help, tried to keep things quiet within family  Check in with her applying for non-temp positions at [employer]  "I don't want to be angry" - cries saying this about being like her father  She says she does not remember 6 - 10  She says she remembers her mother trying to kill herself  Relates disgust with physical affection, or reminded about her father, affection doesn't feel right due to how her father treated her, having to hug him after he picked out a hair from her head  Father treated her like "if I do something for you then you owe me", had inappropriate sexual discussions, complimented her when she was 11  "It was the only way I felt wanted" in adolescence to have sex, "that's what 'love' is", lasted for a couple years  "Objectively I know I am a successful person" but she says it doesn't make sense to her as to how/why she's here in her life "I feel like I don't deserve to be happy"  Consider focusing on mindfulness for concentration, sleep hygiene to reduce PHQ-9 score    Believes in reincarnation, that consciousness persists  Check in with using more mindfulness work (aware of deep breathing, grounding)      HPI     Past Medical History:   Diagnosis Date    Acne     ADHD (attention deficit hyperactivity disorder)     Borderline personality disorder [facility] )     Depression     Fracture of carpal bones     Hypothyroidism     Hypothyroidism (acquired)     PTSD (post-traumatic stress disorder)     Suicide and self-inflicted injury ([facility] ) 40/5851    Suicide attempt ([facility] )        Past Surgical History:   Procedure Laterality Date    WISDOM TOOTH EXTRACTION         Current Outpatient Medications   Medication Sig Dispense Refill    albuterol (PROVENTIL HFA,VENTOLIN HFA) 90 mcg/act inhaler TAKE 2 PUFFS BY MOUTH EVERY 6 HOURS AS NEEDED FOR WHEEZE OR FOR SHORTNESS OF BREATH 6 7 g 5    fluticasone (FLONASE) 50 mcg/act nasal spray 1 spray into each nostril daily 1 Bottle 2    levothyroxine 50 mcg tablet Take 1 tablet (50 mcg total) by mouth daily 90 tablet 1    PARAGARD INTRAUTERINE COPPER IU 1 Device by Intrauterine route continuous       No current facility-administered medications for this visit  No Known Allergies    Review of Systems    Video Exam    There were no vitals filed for this visit  Physical Exam      I spent 54 minutes directly with the patient during this visit    VIRTUAL VISIT [address] [place] [address] verbally agrees to participate in [employer]  Pt is aware that [employer] could be limited without vital signs or the ability to perform a full hands-on physical exam  Doris Burrell understands she or the provider may request at any time to terminate the video visit and request the patient to seek care or treatment in person

## 2022-05-16 ENCOUNTER — TELEMEDICINE (OUTPATIENT)
Dept: BEHAVIORAL/MENTAL HEALTH CLINIC | Facility: CLINIC | Age: 22
End: 2022-05-16
Payer: COMMERCIAL

## 2022-05-16 DIAGNOSIS — Z86.59 HISTORY OF POSTTRAUMATIC STRESS DISORDER (PTSD): ICD-10-CM

## 2022-05-16 DIAGNOSIS — F33.1 MAJOR DEPRESSIVE DISORDER, RECURRENT EPISODE, MODERATE (HCC): Primary | ICD-10-CM

## 2022-05-16 PROCEDURE — 90834 PSYTX W PT 45 MINUTES: CPT | Performed by: COUNSELOR

## 2022-05-16 NOTE — PSYCH
Virtual Regular Visit    Verification of patient location:    Patient is located in the following state in which I hold an active license [place]      Assessment/Plan:    Problem List Items Addressed This Visit    None     Visit Diagnoses     Major depressive disorder, recurrent episode, moderate ([facility] )    -  Primary    History of posttraumatic stress disorder (PTSD)              Goals addressed in session: Goal 1          Reason for visit is   Chief Complaint   Patient presents with    Virtual Regular Visit        Encounter provider Oleg Sheppard    Provider located at [address] [place] [id number] [phone]      Recent Visits  Date Type Provider Dept   05/13/22 [address] [place] [address] recent visits within past 7 days and meeting all other requirements  Today's Visits  Date Type Provider Dept   05/16/22 Telemedicine [facility] today's visits and meeting all other requirements  Future Appointments  No visits were found meeting these conditions  Showing future appointments within next 150 days and meeting all other requirements       The patient was identified by name and date of birth  Rosalind Grossman was informed that this is a telemedicine visit and that the visit is being [facility] and patient was informed this is a secure, HIPAA-complaint platform  She agrees to proceed     My office door was closed  No one else was in the room  She acknowledged consent and understanding of privacy and security of the video platform  The patient has agreed to participate and understands they can discontinue the visit at any time  Patient is aware this is a billable service       Subjective  Rosalind Grossman is a 20 y o  female     Time: 10:06am - 10:55am  Previous session plan: Check in with considering the art of happiness, utilizing "I" statements when talking to [facility]  "I would be a lot less angry", less jealous if she could let go of the judgement of others for having it "easier" than her growing up (Consider CPT socratic questioning)  CONTINUED: Says she's realizing that parents taught her to not reach out to others for help, tried to keep things quiet within family  Check in with her applying for non-temp positions at [employer]  "I don't want to be angry" - cries saying this about being like her father  She says she does not remember 6 - 10  She says she remembers her mother trying to kill herself  Relates disgust with physical affection, or reminded about her father, affection doesn't feel right due to how her father treated her, having to hug him after he picked out a hair from her head  Father treated her like "if I do something for you then you owe me", had inappropriate sexual discussions, complimented her when she was 11  "It was the only way I felt wanted" in adolescence to have sex, "that's what 'love' is", lasted for a couple years  "Objectively I know I am a successful person" but she says it doesn't make sense to her as to how/why she's here in her life "I feel like I don't deserve to be happy"  Consider focusing on mindfulness for concentration, sleep hygiene to reduce PHQ-9 score   Believes in reincarnation, that consciousness persists  Check in with using more mindfulness work (aware of deep breathing, grounding)  D: Discussed no need to go over symptoms of bipolar disorder, borderline personality disorder  Discussed hypomania briefly, BPD traits  Discussed her symptoms  Focused on humanism, individuality - importance of meeting Kat Valero where she is at with her specific experiences rather than labels  Discussed how sometimes she does not know how to reduce her hypomania  Focused on being self-aware   Focused on leaning into her behavior of reducing energy, focusing on being productive when she is feeling up  Focused on how she can challenge her thoughts when she is feeling manic, or feeling depressed  She says most recent depressive episode she had SI, was formulating a plan before she got out of it  Focused on a plan, moving forward, if she were to have SI or formulating a plan again  She says reaching out to friends and family, going for a long walk, laying on a park bench, challenging thoughts, looking forwardt o future events, reaching out to this writer (gave office number), considering giving medication to a friend if she needs to  This writer provided PCT through empathic listening, validation of feelings, reflection of content and feelings, personal safety planning  A: Zeinab Alarcon appeared to be in a euthymic mood with congruent affect, seemed to be experiencing thoughts that were hopeful and forward looking, WNL thoughts, stable mood apparent, appeared to be kempt, no SI/HI nor GOMEZ risk apparent or reported, seemed to respond well to PCT, SFBT, safety planning  P: CONTINUED: Check in with considering the art of happiness, utilizing "I" statements when talking to [facility]  "I would be a lot less angry", less jealous if she could let go of the judgement of others for having it "easier" than her growing up (Consider CPT socratic questioning)  CONTINUED: Says she's realizing that parents taught her to not reach out to others for help, tried to keep things quiet within family  Check in with her applying for non-temp positions at [employer]  "I don't want to be angry" - cries saying this about being like her father  She says she does not remember 6 - 10  She says she remembers her mother trying to kill herself  Relates disgust with physical affection, or reminded about her father, affection doesn't feel right due to how her father treated her, having to hug him after he picked out a hair from her head   Father treated her like "if I do something for you then you owe me", had inappropriate sexual discussions, complimented her when she was 11  "It was the only way I felt wanted" in adolescence to have sex, "that's what 'love' is", lasted for a couple years  "Objectively I know I am a successful person" but she says it doesn't make sense to her as to how/why she's here in her life "I feel like I don't deserve to be happy"  Consider focusing on mindfulness for concentration, sleep hygiene to reduce PHQ-9 score   Believes in reincarnation, that consciousness persists  Check in with using more mindfulness work (aware of deep breathing, grounding)  Safety plan in 5/16/22 note  HPI     Past Medical History:   Diagnosis Date    Acne     ADHD (attention deficit hyperactivity disorder)     Borderline personality disorder ([facility] )     Depression     Fracture of carpal bones     Hypothyroidism     Hypothyroidism (acquired)     PTSD (post-traumatic stress disorder)     [facility] ) 93/1211    Suicide attempt ([facility] )        Past Surgical History:   Procedure Laterality Date    WISDOM TOOTH EXTRACTION         Current Outpatient Medications   Medication Sig Dispense Refill    albuterol (PROVENTIL HFA,VENTOLIN HFA) 90 mcg/act inhaler TAKE 2 PUFFS BY MOUTH EVERY 6 HOURS AS NEEDED FOR WHEEZE OR FOR SHORTNESS OF BREATH 6 7 g 5    fluticasone (FLONASE) 50 mcg/act nasal spray 1 spray into each nostril daily 1 Bottle 2    levothyroxine 50 mcg tablet Take 1 tablet (50 mcg total) by mouth daily 90 tablet 1    PARAGARD INTRAUTERINE COPPER IU 1 Device by Intrauterine route continuous       No current facility-administered medications for this visit  No Known Allergies    Review of Systems    Video Exam    There were no vitals filed for this visit  Physical Exam     I spent 49 minutes directly with the patient during this visit    VIRTUAL VISIT [address] [place] [address] verbally agrees to participate in [employer]   Pt is aware that [facility] could be limited without vital signs or the ability to perform a full hands-on physical exam  Doris Burrell understands she or the provider may request at any time to terminate the video visit and request the patient to seek care or treatment in person

## 2022-05-25 ENCOUNTER — OFFICE VISIT (OUTPATIENT)
Dept: FAMILY MEDICINE CLINIC | Facility: CLINIC | Age: 22
End: 2022-05-25
Payer: COMMERCIAL

## 2022-05-25 VITALS
HEART RATE: 91 BPM | OXYGEN SATURATION: 98 % | WEIGHT: 145 LBS | DIASTOLIC BLOOD PRESSURE: 78 MMHG | BODY MASS INDEX: 24.75 KG/M2 | RESPIRATION RATE: 16 BRPM | SYSTOLIC BLOOD PRESSURE: 108 MMHG | TEMPERATURE: 99.3 F | HEIGHT: 64 IN

## 2022-05-25 DIAGNOSIS — Z13.220 SCREENING FOR HYPERLIPIDEMIA: ICD-10-CM

## 2022-05-25 DIAGNOSIS — F33.0 MILD EPISODE OF RECURRENT MAJOR DEPRESSIVE DISORDER (HCC): ICD-10-CM

## 2022-05-25 DIAGNOSIS — Z13.0 SCREENING FOR DEFICIENCY ANEMIA: ICD-10-CM

## 2022-05-25 DIAGNOSIS — E03.9 PRIMARY HYPOTHYROIDISM: ICD-10-CM

## 2022-05-25 DIAGNOSIS — Z00.00 ANNUAL PHYSICAL EXAM: Primary | ICD-10-CM

## 2022-05-25 DIAGNOSIS — Z13.1 SCREENING FOR DIABETES MELLITUS (DM): ICD-10-CM

## 2022-05-25 PROBLEM — F31.81 BIPOLAR 2 DISORDER (HCC): Status: ACTIVE | Noted: 2019-11-16

## 2022-05-25 PROCEDURE — 99395 PREV VISIT EST AGE 18-39: CPT | Performed by: FAMILY MEDICINE

## 2022-05-25 PROCEDURE — 1036F TOBACCO NON-USER: CPT | Performed by: FAMILY MEDICINE

## 2022-05-25 PROCEDURE — 3008F BODY MASS INDEX DOCD: CPT | Performed by: FAMILY MEDICINE

## 2022-05-25 NOTE — ASSESSMENT & PLAN NOTE
Normal exam   2 paternal aunts with breast cancer  Pt will start breast cancer screening at 35  Mother with colon cancer in her 46s  Pt will start colon cancer screening at age 35  Screening labs ordered

## 2022-05-25 NOTE — PROGRESS NOTES
[facility] PRACTICE    NAME: Rosalind Grossman  AGE: 20 y o  SEX: female  : 2000     DATE: 2022     Assessment and Plan:     Problem List Items Addressed This Visit        Endocrine    Primary hypothyroidism     Reordered testing  Relevant Orders    TSH, 3rd generation with Free T4 reflex       Other    Annual physical exam - Primary     Normal exam   2 paternal aunts with breast cancer  Pt will start breast cancer screening at 35  Mother with colon cancer in her 46s  Pt will start colon cancer screening at age 35  Screening labs ordered  Episode of recurrent major depressive disorder ([address] )     Going to therapy  Recommended reestablishing care with psychiatry  Other Visit Diagnoses     Screening for hyperlipidemia        Relevant Orders    Lipid Panel with Direct LDL reflex    Screening for deficiency anemia        Relevant Orders    CBC and differential    Screening for diabetes mellitus (DM)        Relevant Orders    Comprehensive metabolic panel          Immunizations and preventive care screenings were discussed with patient today  Appropriate education was printed on patient's after visit summary  Counseling:  Alcohol/drug use: discussed moderation in alcohol intake, the recommendations for healthy alcohol use, and avoidance of illicit drug use  Dental Health: discussed importance of regular tooth brushing, flossing, and dental visits  Exercise: the importance of regular exercise/physical activity was discussed  Recommend exercise 3-5 times per week for at least 30 minutes  Return in about 1 year (around 2023)  Chief Complaint:     Chief Complaint   Patient presents with    Follow-up      History of Present Illness:     Adult Annual Physical   Patient here for a comprehensive physical exam  The patient reports no problems      Diet and Physical Activity  Diet/Nutrition: well balanced diet  Exercise: walking  Depression Screening  PHQ-2/9 Depression Screening         General Health  Sleep: sleeps well  Hearing: no issues  Vision: goes for regular eye exams  Dental: regular dental visits  /GYN Health  Last menstrual period: 5/15/22  Contraceptive method: IUD placement  History of STDs?: no      Review of Systems:     Review of Systems   Constitutional: Negative for fever and unexpected weight change  HENT: Negative for ear pain, sore throat and trouble swallowing  Eyes: Negative for pain and visual disturbance  Respiratory: Negative for cough, chest tightness, shortness of breath and wheezing  Cardiovascular: Negative for chest pain  Gastrointestinal: Negative for abdominal distention, abdominal pain, blood in stool, constipation, diarrhea, nausea and vomiting  Endocrine: Negative for polydipsia and polyuria  Genitourinary: Negative for dysuria and hematuria  Musculoskeletal: Negative for back pain and myalgias  Skin: Negative for rash  Neurological: Negative for syncope and headaches  Psychiatric/Behavioral: Negative for suicidal ideas        Past Medical History:     Past Medical History:   Diagnosis Date    Acne     ADHD (attention deficit hyperactivity disorder)     [facility] )     Depression     Fracture of carpal bones     Hypothyroidism     Hypothyroidism (acquired)     PTSD (post-traumatic stress disorder)     Suicide and self-inflicted injury ([facility] ) 21/2390    Suicide attempt [facility])       Past Surgical History:     Past Surgical History:   Procedure Laterality Date    WISDOM TOOTH EXTRACTION        Social History:     Social History     Socioeconomic History    Marital status: Single     Spouse name: None    Number of children: None    Years of education: None    Highest education level: None   Occupational History    None   Tobacco Use    Smoking status: Former Smoker     Types: Cigarettes     Quit date: 2021     Years since quittin 4    Smokeless tobacco: Current User    Tobacco comment: vaping   Vaping Use    Vaping Use: Former   Substance and Sexual Activity    Alcohol use: Yes     Comment: socially     Drug use: Yes     Types: Marijuana     Comment: Rarely    Sexual activity: Yes     Partners: Male     Birth control/protection: Condom Male, I U D  Other Topics Concern    None   Social History Narrative    Non- smoker     No known smoke exposure     caffeine use - 2 cups per day     Alcohol Socially     Seatbelt use     Single     Illicit drug use - [facility] -  2 x month  Lives with boyfriend     Number of siblings: 4    Relationship with siblings: good     Social Determinants of Health     Financial Resource Strain: Not on file   Food Insecurity: Not on file   Transportation Needs: Not on file   Physical Activity: Not on file   Stress: No Stress Concern Present    Feeling of Stress :  Only a little   Social Connections: Not on file   Intimate Partner Violence: Not on file   Housing Stability: Not on file      Family History:     Family History   Problem Relation Age of Onset    ADD / ADHD Mother     Bipolar disorder Mother    Osker Ok Stroke Father     Hypertension Father    Osker Ok ADD / ADHD Father     Diabetes Father     ADD / ADHD Sister     ADD / ADHD Brother     ADD / ADHD Brother     Uterine cancer Maternal Grandmother     Cervical cancer Maternal Grandmother     Endometrial cancer Maternal Grandmother     Breast cancer Paternal Aunt     Bipolar disorder Paternal Uncle     Skin cancer Paternal Uncle     Hypertension Family     Thyroid disease Family     Cancer Family     Heart disease Family     Diabetes Family       Current Medications:     Current Outpatient Medications   Medication Sig Dispense Refill    fluticasone (FLONASE) 50 mcg/act nasal spray 1 spray into each nostril daily 1 Bottle 2    levothyroxine 50 mcg tablet Take 1 tablet (50 mcg total) by mouth daily 90 tablet 1    PARAGARD INTRAUTERINE COPPER IU 1 Device by Intrauterine route continuous      albuterol (PROVENTIL HFA,VENTOLIN HFA) 90 mcg/act inhaler TAKE 2 PUFFS BY MOUTH EVERY 6 HOURS AS NEEDED FOR WHEEZE OR FOR SHORTNESS OF BREATH (Patient not taking: Reported on 5/25/2022) 6 7 g 5     No current facility-administered medications for this visit  Allergies:     No Known Allergies   Physical Exam:     /78 (BP Location: Left arm, Patient Position: Sitting, Cuff Size: Standard)   Pulse 91   Temp 99 3 °F (37 4 °C) (Tympanic)   Resp 16   Ht 5' 4" (1 626 m)   Wt 65 8 kg (145 lb)   SpO2 98%   BMI 24 89 kg/m²     Physical Exam  Constitutional:       Appearance: She is well-developed  HENT:      Head: Normocephalic and atraumatic  Eyes:      General: No scleral icterus  Conjunctiva/sclera: Conjunctivae normal       Pupils: Pupils are equal, round, and reactive to light  Cardiovascular:      Rate and Rhythm: Normal rate and regular rhythm  Heart sounds: Normal heart sounds  No murmur heard  No friction rub  No gallop  Pulmonary:      Effort: Pulmonary effort is normal  No respiratory distress  Breath sounds: No wheezing or rales  Chest:      Chest wall: No tenderness  Abdominal:      General: Bowel sounds are normal  There is no distension  Palpations: Abdomen is soft  There is no mass  Tenderness: There is no abdominal tenderness  Musculoskeletal:         General: Normal range of motion  Cervical back: Normal range of motion and neck supple  Lymphadenopathy:      Cervical: No cervical adenopathy  Skin:     General: Skin is warm and dry  Capillary Refill: Capillary refill takes less than 2 seconds  Findings: No rash  Neurological:      Mental Status: She is alert and oriented to person, place, and time  Cranial Nerves: No cranial nerve deficit            Ren Winters MD   [address] [place] [address] PRACTICE

## 2022-06-03 ENCOUNTER — LAB (OUTPATIENT)
Dept: LAB | Facility: CLINIC | Age: 22
End: 2022-06-03
Payer: COMMERCIAL

## 2022-06-03 DIAGNOSIS — Z13.220 SCREENING FOR HYPERLIPIDEMIA: ICD-10-CM

## 2022-06-03 DIAGNOSIS — Z13.0 SCREENING FOR DEFICIENCY ANEMIA: ICD-10-CM

## 2022-06-03 DIAGNOSIS — E03.9 PRIMARY HYPOTHYROIDISM: ICD-10-CM

## 2022-06-03 DIAGNOSIS — Z13.1 SCREENING FOR DIABETES MELLITUS (DM): ICD-10-CM

## 2022-06-03 LAB
ALBUMIN SERPL BCP-MCNC: 4 G/DL (ref 3.5–5)
ALP SERPL-CCNC: 42 U/L (ref 46–116)
ALT SERPL W P-5'-P-CCNC: 32 U/L (ref 12–78)
ANION GAP SERPL CALCULATED.3IONS-SCNC: 2 MMOL/L (ref 4–13)
AST SERPL W P-5'-P-CCNC: 34 U/L (ref 5–45)
BASOPHILS # BLD AUTO: 0.05 THOUSANDS/ΜL (ref 0–0.1)
BASOPHILS NFR BLD AUTO: 1 % (ref 0–1)
BILIRUB SERPL-MCNC: 1.02 MG/DL (ref 0.2–1)
BUN SERPL-MCNC: 11 MG/DL (ref 5–25)
CALCIUM SERPL-MCNC: 9.8 MG/DL (ref 8.3–10.1)
CHLORIDE SERPL-SCNC: 106 MMOL/L (ref 100–108)
CHOLEST SERPL-MCNC: 153 MG/DL
CO2 SERPL-SCNC: 28 MMOL/L (ref 21–32)
CREAT SERPL-MCNC: 1 MG/DL (ref 0.6–1.3)
EOSINOPHIL # BLD AUTO: 0.08 THOUSAND/ΜL (ref 0–0.61)
EOSINOPHIL NFR BLD AUTO: 1 % (ref 0–6)
ERYTHROCYTE [DISTWIDTH] IN BLOOD BY AUTOMATED COUNT: 12.7 % (ref 11.6–15.1)
GFR SERPL CREATININE-BSD FRML MDRD: 79 ML/MIN/1.73SQ M
GLUCOSE P FAST SERPL-MCNC: 87 MG/DL (ref 65–99)
HCT VFR BLD AUTO: 43.1 % (ref 34.8–46.1)
HDLC SERPL-MCNC: 51 MG/DL
HGB BLD-MCNC: 14.1 G/DL (ref 11.5–15.4)
IMM GRANULOCYTES # BLD AUTO: 0.02 THOUSAND/UL (ref 0–0.2)
IMM GRANULOCYTES NFR BLD AUTO: 0 % (ref 0–2)
LDLC SERPL CALC-MCNC: 89 MG/DL (ref 0–100)
LYMPHOCYTES # BLD AUTO: 2.51 THOUSANDS/ΜL (ref 0.6–4.47)
LYMPHOCYTES NFR BLD AUTO: 29 % (ref 14–44)
MCH RBC QN AUTO: 29.7 PG (ref 26.8–34.3)
MCHC RBC AUTO-ENTMCNC: 32.7 G/DL (ref 31.4–37.4)
MCV RBC AUTO: 91 FL (ref 82–98)
MONOCYTES # BLD AUTO: 0.58 THOUSAND/ΜL (ref 0.17–1.22)
MONOCYTES NFR BLD AUTO: 7 % (ref 4–12)
NEUTROPHILS # BLD AUTO: 5.58 THOUSANDS/ΜL (ref 1.85–7.62)
NEUTS SEG NFR BLD AUTO: 62 % (ref 43–75)
NRBC BLD AUTO-RTO: 0 /100 WBCS
PLATELET # BLD AUTO: 298 THOUSANDS/UL (ref 149–390)
PMV BLD AUTO: 9.5 FL (ref 8.9–12.7)
POTASSIUM SERPL-SCNC: 4.9 MMOL/L (ref 3.5–5.3)
PROT SERPL-MCNC: 7.5 G/DL (ref 6.4–8.2)
RBC # BLD AUTO: 4.75 MILLION/UL (ref 3.81–5.12)
SODIUM SERPL-SCNC: 136 MMOL/L (ref 136–145)
TRIGL SERPL-MCNC: 67 MG/DL
TSH SERPL DL<=0.05 MIU/L-ACNC: 1.24 UIU/ML (ref 0.45–4.5)
WBC # BLD AUTO: 8.82 THOUSAND/UL (ref 4.31–10.16)

## 2022-06-03 PROCEDURE — 80061 LIPID PANEL: CPT

## 2022-06-03 PROCEDURE — 36415 COLL VENOUS BLD VENIPUNCTURE: CPT

## 2022-06-03 PROCEDURE — 85025 COMPLETE CBC W/AUTO DIFF WBC: CPT

## 2022-06-03 PROCEDURE — 84443 ASSAY THYROID STIM HORMONE: CPT

## 2022-06-03 PROCEDURE — 80053 COMPREHEN METABOLIC PANEL: CPT

## 2022-06-07 ENCOUNTER — TELEMEDICINE (OUTPATIENT)
Dept: BEHAVIORAL/MENTAL HEALTH CLINIC | Facility: CLINIC | Age: 22
End: 2022-06-07
Payer: COMMERCIAL

## 2022-06-07 DIAGNOSIS — F41.1 GENERALIZED ANXIETY DISORDER: ICD-10-CM

## 2022-06-07 DIAGNOSIS — Z86.59 HISTORY OF POSTTRAUMATIC STRESS DISORDER (PTSD): ICD-10-CM

## 2022-06-07 DIAGNOSIS — F33.1 MAJOR DEPRESSIVE DISORDER, RECURRENT EPISODE, MODERATE (HCC): Primary | ICD-10-CM

## 2022-06-07 PROCEDURE — 90834 PSYTX W PT 45 MINUTES: CPT | Performed by: COUNSELOR

## 2022-06-07 NOTE — PSYCH
Virtual Regular Visit    Verification of patient location:    Patient is located in the following state in which I hold an active license [place]      Assessment/Plan:    Problem List Items Addressed This Visit    None         Goals addressed in session: Goal 1          Reason for visit is   Chief Complaint   Patient presents with    Virtual Regular Visit        Encounter provider Raquel Bolanos    Provider located at [address] [place] [id number] [phone]      Recent Visits  No visits were found meeting these conditions  Showing recent visits within past 7 days and meeting all other requirements  Future Appointments  No visits were found meeting these conditions  Showing future appointments within next 150 days and meeting all other requirements       The patient was identified by name and date of birth  Humberto Louis was informed that this is a telemedicine visit and that the visit is being conducted throughEpic Embedded and patient was informed this is a secure, HIPAA-complaint platform  She agrees to proceed     My office door was closed  No one else was in the room  She acknowledged consent and understanding of privacy and security of the video platform  The patient has agreed to participate and understands they can discontinue the visit at any time  Patient is aware this is a billable service       Subjective  Humberto Louis is a 20 y o  female     Time: 10:00am - 10:47am  Previous session plan: CONTINUED: Check in with considering the art of happiness, utilizing "I" statements when talking to [facility]  "I would be a lot less angry", less jealous if she could let go of the judgement of others for having it "easier" than her growing up (Consider CPT socratic questioning)  Says she's realizing that parents taught her to not reach out to others for help, tried to keep things quiet within family  Check in with her applying for non-temp positions at [employer]  "I don't want to be angry" - cries saying this about being like her father  She says she does not remember 6 - 10  She says she remembers her mother trying to kill herself  Relates disgust with physical affection, or reminded about her father, affection doesn't feel right due to how her father treated her, having to hug him after he picked out a hair from her head  Father treated her like "if I do something for you then you owe me", had inappropriate sexual discussions, complimented her when she was 11  "It was the only way I felt wanted" in adolescence to have sex, "that's what 'love' is", lasted for a couple years  "Objectively I know I am a successful person" but she says it doesn't make sense to her as to how/why she's here in her life "I feel like I don't deserve to be happy"  Consider focusing on mindfulness for concentration, sleep hygiene to reduce PHQ-9 score   Believes in reincarnation, that consciousness persists  Check in with using more mindfulness work (aware of deep breathing, grounding)  Safety plan in 5/16/22 note  D: Explored what recently bothered her about her relationship with TJ  She says she felt like she was "second fiddle"  She says she has some trouble with plans changing  She says that she and TJ are back together officially, committed to one another  She says she did interview for a full time position at [address], does not think she will get it  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  Continued discussion about planner vs spontaneous  Explored how she feels about her partner, growing together  Checked in with her support system of friends and how she enjoys her time with them  Discussed power and control  This writer provided PCT through empathic listening, validation of feelings, reflection of content and feelings, psychoeducation  CBT work done through reframing work, normalization, socratic dialogue     A: Gage Mondragon appeared to be in a mildly euthymic mood with congruent affect, seemed to be experiencing lower mood at times, mild insomnia, excessive worry, seemingly improved mood, improved hopeful thoughts, thoughts closer to WNL apparent, appeared to be kempt, no SI/HI nor GOMEZ risk apparent or reported, seemed to respond well to PCT work today along with CBT interventions  P: UPDATE TREATMENT PLAN  CONTINUED: Check in with considering the art of happiness, utilizing "I" statements when talking to [facility]  "I would be a lot less angry", less jealous if she could let go of the judgement of others for having it "easier" than her growing up (Consider CPT socratic questioning)  Says she's realizing that parents taught her to not reach out to others for help, tried to keep things quiet within family  Check in with her applying for non-temp positions at [employer]  "I don't want to be angry" - cries saying this about being like her father  She says she does not remember 6 - 10  She says she remembers her mother trying to kill herself  Relates disgust with physical affection, or reminded about her father, affection doesn't feel right due to how her father treated her, having to hug him after he picked out a hair from her head  Father treated her like "if I do something for you then you owe me", had inappropriate sexual discussions, complimented her when she was 11  "It was the only way I felt wanted" in adolescence to have sex, "that's what 'love' is", lasted for a couple years  "Objectively I know I am a successful person" but she says it doesn't make sense to her as to how/why she's here in her life "I feel like I don't deserve to be happy"  Consider focusing on mindfulness for concentration, sleep hygiene to reduce PHQ-9 score   Believes in reincarnation, that consciousness persists  Check in with using more mindfulness work (aware of deep breathing, grounding)  Safety plan in 5/16/22 note           HPI     Past Medical History: Diagnosis Date    Acne     ADHD (attention deficit hyperactivity disorder)     [facility]     Depression     Fracture of carpal bones     Hypothyroidism     Hypothyroidism (acquired)     PTSD (post-traumatic stress disorder)     Suicide and self-inflicted injury ([facility] ) 38/8672    Suicide attempt ([facility] )        Past Surgical History:   Procedure Laterality Date    WISDOM TOOTH EXTRACTION         Current Outpatient Medications   Medication Sig Dispense Refill    albuterol (PROVENTIL HFA,VENTOLIN HFA) 90 mcg/act inhaler TAKE 2 PUFFS BY MOUTH EVERY 6 HOURS AS NEEDED FOR WHEEZE OR FOR SHORTNESS OF BREATH (Patient not taking: Reported on 5/25/2022) 6 7 g 5    fluticasone (FLONASE) 50 mcg/act nasal spray 1 spray into each nostril daily 1 Bottle 2    levothyroxine 50 mcg tablet Take 1 tablet (50 mcg total) by mouth daily 90 tablet 1    PARAGARD INTRAUTERINE COPPER IU 1 Device by Intrauterine route continuous       No current facility-administered medications for this visit  No Known Allergies    Review of Systems    Video Exam    There were no vitals filed for this visit  Physical Exam     I spent 47 minutes directly with the patient during this visit    VIRTUAL VISIT [address] [place] [address] verbally agrees to participate in [employer]  Pt is aware that [employer] could be limited without vital signs or the ability to perform a full hands-on physical exam  Doris Burrell understands she or the provider may request at any time to terminate the video visit and request the patient to seek care or treatment in person

## 2022-06-13 ENCOUNTER — TELEMEDICINE (OUTPATIENT)
Dept: BEHAVIORAL/MENTAL HEALTH CLINIC | Facility: CLINIC | Age: 22
End: 2022-06-13
Payer: COMMERCIAL

## 2022-06-13 DIAGNOSIS — F33.1 MAJOR DEPRESSIVE DISORDER, RECURRENT EPISODE, MODERATE (HCC): Primary | ICD-10-CM

## 2022-06-13 DIAGNOSIS — Z86.59 HISTORY OF POSTTRAUMATIC STRESS DISORDER (PTSD): ICD-10-CM

## 2022-06-13 PROCEDURE — 90834 PSYTX W PT 45 MINUTES: CPT | Performed by: COUNSELOR

## 2022-06-13 NOTE — BH TREATMENT PLAN
Brady Cruz Ashanti  2000       Date of Initial Treatment Plan: 12/23/2021   Date of Current Treatment Plan: 06/13/22    Treatment Plan Number 2      Strengths/Personal Resources for Self Care: Her boyfriend and some close friends and family, her creativity including painting and drawing, getting outside going hiking in nature, playing video games       Diagnosis:   1  Post traumatic stress disorder (PTSD)      2  Major depressive disorder, recurrent episode, moderate ([address] )            Area of Needs: Thoughts of hopelessness and especially worthlessness, symptoms of PTSD including avoidance behaviors, lower mood, anxious distress, reduced sense of trust and intimacy          Long Term Goal 1: "I just want to feel calm, not as anxious" "Less in my head" "Be more present" Her MARCI-7 score will be maitainined at 9 or below for at least a month (Currently 12 on 6/13/2022)     Target Date: 12/13/2022  Completion Date: N/A         Short Term Objectives for Goal 1: This writer will provide Marco with CBT, SFBT, PCT through work on [employer], reframing, socratic dialogue, empathic listening, validation of feelings, reflection of content and feelings, decatastrophizing  Will provide mindfulness interventions  Marco will engage in her therapy sessions and complete work outside of therapy  She will take medication as prescribed       Long Term Goal 2: Symptoms of depression will be reduced to be mild or eliminated as evidenced by her PHQ-9 score and self-report (Currently 12, or moderate on 6/13/2022)     Target Date: 12/23/2022  Completion Date: N/A     Short Term Objectives for Goal 2: This writer will provide Marco with CBT, SFBT, PCT through work on [employer], reframing, socratic dialogue, empathic listening, validation of feelings, reflection of content and feelings  Engage in psychoeducation regarding sleep hygiene, self-esteem  Marco will engage in her therapy sessions and complete work outside of therapy  She will take medication as prescribed            Long Term Goal # 3: Marisela Guy will consider work on PTSD, reducing PCL-5 score below 38        Target Date: N/A  Completion Date: N/A     Short Term Objectives for Goal 3: This writer will provide Marisela Guy with CPT interventions     GOAL 1: Modality: Individual 2x per month   Completion Date n/a, Medication Management and The person(s) responsible for carrying out the plan is  Leopoldo Hales and Dr West Rice     GOAL 2: Modality: Individual 2x per month   Completion Date n/a, Medication Management and The person(s) responsible for carrying out the plan is  Leopoldo Hales and Dr West Rice      GOAL 3: Modality: Individual 4x per month   Completion Date n/a, Medication Management and The person(s) responsible for carrying out the plan is  Leopoldo Hales and Dr Wali Church: Diagnosis and Treatment Plan explained to Russel Gomez relates understanding diagnosis and is agreeable to Treatment Plan          Client Comments : Please share your thoughts, feelings, need and/or experiences regarding your treatment plan: In agreement, helped create this treatment plan        Doris Burrell, 2000, actively participated in the review and update of this treatment plan during a virtual session, using the [address] [place] [address]  Pearblossom Apley  provided verbal consent on 6/13/2022 at 10:29 AM  The treatment plan was transcribed into the [email or web address] Record at a later time
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
## 2022-06-27 ENCOUNTER — TELEMEDICINE (OUTPATIENT)
Dept: BEHAVIORAL/MENTAL HEALTH CLINIC | Facility: CLINIC | Age: 22
End: 2022-06-27
Payer: COMMERCIAL

## 2022-06-27 DIAGNOSIS — Z86.59 HISTORY OF POSTTRAUMATIC STRESS DISORDER (PTSD): ICD-10-CM

## 2022-06-27 DIAGNOSIS — F33.1 MAJOR DEPRESSIVE DISORDER, RECURRENT EPISODE, MODERATE (HCC): Primary | ICD-10-CM

## 2022-06-27 PROCEDURE — 90834 PSYTX W PT 45 MINUTES: CPT | Performed by: COUNSELOR

## 2022-06-27 NOTE — PSYCH
Virtual Regular Visit    Verification of patient location:    Patient is located in the following state in which I hold an active license [place]      Assessment/Plan:    Problem List Items Addressed This Visit    None     Visit Diagnoses     Major depressive disorder, recurrent episode, moderate ([facility] [address] )    -  Primary    History of posttraumatic stress disorder (PTSD)              Goals addressed in session: Goal 1          Reason for visit is   Chief Complaint   Patient presents with    Virtual Regular Visit        Encounter provider Remy Melvin    Provider located at [address] [place] [id number] [phone]      Recent Visits  No visits were found meeting these conditions  Showing recent visits within past 7 days and meeting all other requirements  Today's Visits  Date Type Provider Dept   06/27/22 [address] [place] [address] today's visits and meeting all other requirements  Future Appointments  No visits were found meeting these conditions  Showing future appointments within next 150 days and meeting all other requirements       The patient was identified by name and date of birth  Shyla Alicea was informed that this is a telemedicine visit and that the visit is being conducted through[employer] Embedded and patient was informed this is a secure, HIPAA-complaint platform  She agrees to proceed     My office door was closed  No one else was in the room  She acknowledged consent and understanding of privacy and security of the video platform  The patient has agreed to participate and understands they can discontinue the visit at any time  Patient is aware this is a billable service       Subjective  Shyla Alicea is a 20 y o  female     Time: 10:05am - 10:52am  Previous session plan: Spend more time discussing sleep hygiene (nightmares nearly every night, teach IRT), self-esteem, unable to relax  Discuss moments in which she doesn't want to be touched, being harsh at times with Cony Silveira in with considering the art of happiness, utilizing "I" statements when talking to One [facility]  "I would be a lot less angry", less jealous if she could let go of the judgement of others for having it "easier" than her growing up (Consider CPT socratic questioning)  Says she's realizing that parents taught her to not reach out to others for help, tried to keep things quiet within family  Check in with her applying for non-temp positions at [employer]  "I don't want to be angry" - cries saying this about being like her father  She says she does not remember 6 - 10  She says she remembers her mother trying to kill herself  Relates disgust with physical affection, or reminded about her father, affection doesn't feel right due to how her father treated her, having to hug him after he picked out a hair from her head  Father treated her like "if I do something for you then you owe me", had inappropriate sexual discussions, complimented her when she was 11  "It was the only way I felt wanted" in adolescence to have sex, "that's what 'love' is", lasted for a couple years  "Objectively I know I am a successful person" but she says it doesn't make sense to her as to how/why she's here in her life "I feel like I don't deserve to be happy"  Consider focusing on mindfulness for concentration, sleep hygiene to reduce PHQ-9 score   Believes in reincarnation, that consciousness persists  Check in with using more mindfulness work (aware of deep breathing, grounding)  Safety plan in 5/16/22 note    D: Discussed how she thinks about control in her life, exploring importance of finding that she has tried to do everything she can  Discussed her job hunt  She says she has been enjoying time with her boyfriend, he's moved into her apartment   Discussed sleep hygiene - going to sleep at same time each night, trying to get 8 hours, keeping dark room, using a white noise machine  She says that she has been thinking about the  ruling  She says she has been not "tweaking" on TJ recently, has been considering how she responds to [facility]  This writer provided CBT through [employer], empathic listening, validation of feelings, reflection of content and feelings  A: Jose Herron appeared to be in a neutral mood with congruent affect, seemed to be experiencing mild fatigue, insomnia, nightmares, mild thoughts of hopelessness and worthlessness, appeared to be kempt, no SI/HI nor GOMEZ risk apparent or reported, seemed to respond well to PCT, CBT work  P: Discussed sleep hygiene (possibly trying new sleep schedule), discuss IRT when possible, self-esteem, unable to relax  Discuss moments in which she doesn't want to be touched, being harsh at times with Yue Zaragoza in with considering the art of happiness, utilizing "I" statements when talking to [facility]  "I would be a lot less angry", less jealous if she could let go of the judgement of others for having it "easier" than her growing up (Consider CPT socratic questioning)  Says she's realizing that parents taught her to not reach out to others for help, tried to keep things quiet within family  Check in with her applying for non-temp positions at [employer]  "I don't want to be angry" - cries saying this about being like her father  She says she does not remember 6 - 10  She says she remembers her mother trying to kill herself  Relates disgust with physical affection, or reminded about her father, affection doesn't feel right due to how her father treated her, having to hug him after he picked out a hair from her head  Father treated her like "if I do something for you then you owe me", had inappropriate sexual discussions, complimented her when she was 11   "It was the only way I felt wanted" in adolescence to have sex, "that's what 'love' is", lasted for a couple years  "Objectively I know I am a successful person" but she says it doesn't make sense to her as to how/why she's here in her life "I feel like I don't deserve to be happy"  Consider focusing on mindfulness for concentration, sleep hygiene to reduce PHQ-9 score   Believes in reincarnation, that consciousness persists  Check in with using more mindfulness work (aware of deep breathing, grounding)  Safety plan in 5/16/22 note          HPI     Past Medical History:   Diagnosis Date    Acne     ADHD (attention deficit hyperactivity disorder)     Borderline [facility] disorder ([facility] [address] )     Depression     Fracture of carpal bones     Hypothyroidism     Hypothyroidism (acquired)     PTSD (post-traumatic stress disorder)     [facility] 41/6889    Suicide attempt ([facility] )        Past Surgical History:   Procedure Laterality Date    WISDOM TOOTH EXTRACTION         Current Outpatient Medications   Medication Sig Dispense Refill    albuterol (PROVENTIL HFA,VENTOLIN HFA) 90 mcg/act inhaler TAKE 2 PUFFS BY MOUTH EVERY 6 HOURS AS NEEDED FOR WHEEZE OR FOR SHORTNESS OF BREATH (Patient not taking: Reported on 5/25/2022) 6 7 g 5    fluticasone (FLONASE) 50 mcg/act nasal spray 1 spray into each nostril daily 1 Bottle 2    levothyroxine 50 mcg tablet Take 1 tablet (50 mcg total) by mouth daily 90 tablet 1    PARAGARD INTRAUTERINE COPPER IU 1 Device by Intrauterine route continuous       No current facility-administered medications for this visit  No Known Allergies    Review of Systems    Video Exam    There were no vitals filed for this visit  Physical Exam     I spent 47 minutes directly with the patient during this visit    VIRTUAL VISIT [address] [place] [address] verbally agrees to participate in [employer]   Pt is aware that [employer] could be limited without vital signs or the ability to perform a full hands-on physical exam  Doris Burrell understands she or the provider may request at any time to terminate the video visit and request the patient to seek care or treatment in person

## 2022-07-20 ENCOUNTER — TELEMEDICINE (OUTPATIENT)
Dept: BEHAVIORAL/MENTAL HEALTH CLINIC | Facility: CLINIC | Age: 22
End: 2022-07-20
Payer: COMMERCIAL

## 2022-07-20 ENCOUNTER — TELEPHONE (OUTPATIENT)
Dept: PSYCHIATRY | Facility: CLINIC | Age: 22
End: 2022-07-20

## 2022-07-20 DIAGNOSIS — F33.1 MAJOR DEPRESSIVE DISORDER, RECURRENT EPISODE, MODERATE (HCC): Primary | ICD-10-CM

## 2022-07-20 DIAGNOSIS — F41.1 GENERALIZED ANXIETY DISORDER: ICD-10-CM

## 2022-07-20 DIAGNOSIS — Z86.59 HISTORY OF POSTTRAUMATIC STRESS DISORDER (PTSD): ICD-10-CM

## 2022-07-20 PROCEDURE — 90834 PSYTX W PT 45 MINUTES: CPT | Performed by: COUNSELOR

## 2022-07-20 NOTE — TELEPHONE ENCOUNTER
Called and lvm advising patient to return call to Intake Dept at [phone] to be scheduled with a psychiatrist

## 2022-07-20 NOTE — PSYCH
Virtual Regular Visit    Verification of patient location:    Patient is located in the following state in which I hold an active license [place]      Assessment/Plan:    Problem List Items Addressed This Visit    None         Goals addressed in session: Goal 1          Reason for visit is   Chief Complaint   Patient presents with    Virtual Regular Visit        Encounter provider Sonja Waters    Provider located at [address] [place] [id number] [phone]      Recent Visits  No visits were found meeting these conditions  Showing recent visits within past 7 days and meeting all other requirements  Future Appointments  No visits were found meeting these conditions  Showing future appointments within next 150 days and meeting all other requirements       The patient was identified by name and date of birth  Jose Kwan was informed that this is a telemedicine visit and that the visit is being conducted throughEpic Embedded and patient was informed this is a secure, HIPAA-complaint platform  She agrees to proceed     My office door was closed  No one else was in the room  She acknowledged consent and understanding of privacy and security of the video platform  The patient has agreed to participate and understands they can discontinue the visit at any time  Patient is aware this is a billable service  Subjective  Jose Kwan is a 20 y o  female      Time: 9:01am - 9:59am  Previous session plan: Discussed sleep hygiene (possibly trying new sleep schedule), discuss IRT when possible, self-esteem, unable to relax   Discuss moments in which she doesn't want to be touched, being harsh at times with TJ  CONTINUED: Check in with considering the art of happiness, utilizing "I" statements when talking to [facility]  "I would be a lot less angry", less jealous if she could let go of the judgement of others for having it "easier" than her growing up (Consider CPT socratic questioning)  Says she's realizing that parents taught her to not reach out to others for help, tried to keep things quiet within family  Check in with her applying for non-temp positions at [employer]  "I don't want to be angry" - cries saying this about being like her father  She says she does not remember 6 - 10  She says she remembers her mother trying to kill herself  Relates disgust with physical affection, or reminded about her father, affection doesn't feel right due to how her father treated her, having to hug him after he picked out a hair from her head  Father treated her like "if I do something for you then you owe me", had inappropriate sexual discussions, complimented her when she was 11  "It was the only way I felt wanted" in adolescence to have sex, "that's what 'love' is", lasted for a couple years  "Objectively I know I am a successful person" but she says it doesn't make sense to her as to how/why she's here in her life "I feel like I don't deserve to be happy"  Consider focusing on mindfulness for concentration, sleep hygiene to reduce PHQ-9 score   Believes in reincarnation, that consciousness persists  Check in with using more mindfulness work (aware of deep breathing, grounding)  Safety plan in 5/16/22 note    D: She reports she has a new job with [employer], now working directly with [employer]  She says she is going through onboarding  She says she did go to 4th party with TJ "Everyone was nice", went to his birthday with family and she felt more exhausted that day  "I felt out of place" "I felt suicidal" at dinner, says these thoughts did not persist after dinner or into the following days  Expressed importance to Radha Cardozo of working with psychiatrist on medication management  "I expect worse case scenario" Majority of session spent challenging negative beliefs, identifying negative beliefs, emotions, processing emotions   This writer provided PCT through empathic listening, validation of feelings, reflection of content and feelings  CBT provided through review of her safety plan, [facility] crisis numbers, walk in crisis, ABC worksheet psychoeducation and completion of an example, socratic dialogue  A: Tasia Medina appeared to be in a mildly depressed mood with congruent, tearful affect, seemed to be experiencing lower mood, lethargy, fatigue, avolition, thoughts of hopelessness and worthlessness, appeared to be kempt, no SI/HI nor GOMEZ risk apparent or reported, seemed to respond well to CBT, PCT work today  P: Focused on ABC worksheet in previous session, follow up, review, consider another together  Consider CPT work  CONTINUED: Discussed sleep hygiene (possibly trying new sleep schedule), discuss IRT when possible, self-esteem, unable to relax  Discuss moments in which she doesn't want to be touched, being harsh at times with TJ  CONTINUED: Check in with considering the art of happiness, utilizing "I" statements when talking to [facility]  "I would be a lot less angry", less jealous if she could let go of the judgement of others for having it "easier" than her growing up (Consider CPT socratic questioning)  Says she's realizing that parents taught her to not reach out to others for help, tried to keep things quiet within family  Check in with her applying for non-temp positions at [employer]  "I don't want to be angry" - cries saying this about being like her father  She says she does not remember 6 - 10  She says she remembers her mother trying to kill herself  Relates disgust with physical affection, or reminded about her father, affection doesn't feel right due to how her father treated her, having to hug him after he picked out a hair from her head  Father treated her like "if I do something for you then you owe me", had inappropriate sexual discussions, complimented her when she was 11   "It was the only way I felt wanted" in adolescence to have sex, "that's what 'love' is", lasted for a couple years  "Objectively I know I am a successful person" but she says it doesn't make sense to her as to how/why she's here in her life "I feel like I don't deserve to be happy"  Consider focusing on mindfulness for concentration, sleep hygiene to reduce PHQ-9 score   Believes in reincarnation, that consciousness persists  Check in with using more mindfulness work (aware of deep breathing, grounding)  Safety plan in 5/16/22 note          HPI     Past Medical History:   Diagnosis Date    Acne     ADHD (attention deficit hyperactivity disorder)     Borderline personality disorder ([facility] )     Depression     Fracture of carpal bones     Hypothyroidism     Hypothyroidism (acquired)     PTSD (post-traumatic stress disorder)     Suicide and self-inflicted injury ([place] [address] ) 63/5569    Suicide attempt ([facility] )        Past Surgical History:   Procedure Laterality Date    WISDOM TOOTH EXTRACTION         Current Outpatient Medications   Medication Sig Dispense Refill    albuterol (PROVENTIL HFA,VENTOLIN HFA) 90 mcg/act inhaler TAKE 2 PUFFS BY MOUTH EVERY 6 HOURS AS NEEDED FOR WHEEZE OR FOR SHORTNESS OF BREATH (Patient not taking: Reported on 5/25/2022) 6 7 g 5    fluticasone (FLONASE) 50 mcg/act nasal spray 1 spray into each nostril daily 1 Bottle 2    levothyroxine 50 mcg tablet Take 1 tablet (50 mcg total) by mouth daily 90 tablet 1    PARAGARD INTRAUTERINE COPPER IU 1 Device by Intrauterine route continuous       No current facility-administered medications for this visit  No Known Allergies    Review of Systems    Video Exam    There were no vitals filed for this visit  Physical Exam     I spent 58 minutes directly with the patient during this visit    VIRTUAL VISIT [address] [place] [address] verbally agrees to participate in [employer]   Pt is aware that [employer] could be limited without vital signs or the ability to perform a full hands-on physical exam  Doris Burrell understands she or the provider may request at any time to terminate the video visit and request the patient to seek care or treatment in person

## 2022-07-25 ENCOUNTER — TELEMEDICINE (OUTPATIENT)
Dept: BEHAVIORAL/MENTAL HEALTH CLINIC | Facility: CLINIC | Age: 22
End: 2022-07-25
Payer: COMMERCIAL

## 2022-07-25 DIAGNOSIS — F33.1 MAJOR DEPRESSIVE DISORDER, RECURRENT EPISODE, MODERATE (HCC): Primary | ICD-10-CM

## 2022-07-25 DIAGNOSIS — Z86.59 HISTORY OF POSTTRAUMATIC STRESS DISORDER (PTSD): ICD-10-CM

## 2022-07-25 PROCEDURE — 90834 PSYTX W PT 45 MINUTES: CPT | Performed by: COUNSELOR

## 2022-07-25 NOTE — PSYCH
Virtual Regular Visit    Verification of patient location:    Patient is located in the following state in which I hold an active license [place]      Assessment/Plan:    Problem List Items Addressed This Visit    None         Goals addressed in session: Goal 1          Reason for visit is   Chief Complaint   Patient presents with    Virtual Regular Visit        Encounter provider Shaggy Bhagat    Provider located at [address] [place] [id number] [phone]      Recent Visits  Date Type Provider Dept   07/20/22 [address] [place] [address]   Showing recent visits within past 7 days and meeting all other requirements  Future Appointments  No visits were found meeting these conditions  Showing future appointments within next 150 days and meeting all other requirements       The patient was identified by name and date of birth  Tasia Medina was informed that this is a telemedicine visit and that the visit is being conducted throughEpic Embedded and patient was informed this is a secure, HIPAA-complaint platform  She agrees to proceed     My office door was closed  No one else was in the room  She acknowledged consent and understanding of privacy and security of the video platform  The patient has agreed to participate and understands they can discontinue the visit at any time  Patient is aware this is a billable service  Subjective  Tasia Medina is a 20 y o  female     Time: 10:01am - 10:55am  Previous session plan: Focused on ABC worksheet in previous session, follow up, review, consider another together  Consider CPT work  CONTINUED: Discussed sleep hygiene (possibly trying new sleep schedule), discuss IRT when possible, self-esteem, unable to relax   Discuss moments in which she doesn't want to be touched, being harsh at times with TJ  Check in with considering the art of happiness, utilizing "I" statements when talking to One [facility]  "I would be a lot less angry", less jealous if she could let go of the judgement of others for having it "easier" than her growing up (Consider CPT socratic questioning)  Says she's realizing that parents taught her to not reach out to others for help, tried to keep things quiet within family  Check in with her applying for non-temp positions at [employer]  "I don't want to be angry" - cries saying this about being like her father  She says she does not remember 6 - 10  She says she remembers her mother trying to kill herself  Relates disgust with physical affection, or reminded about her father, affection doesn't feel right due to how her father treated her, having to hug him after he picked out a hair from her head  Father treated her like "if I do something for you then you owe me", had inappropriate sexual discussions, complimented her when she was 11  "It was the only way I felt wanted" in adolescence to have sex, "that's what 'love' is", lasted for a couple years  "Objectively I know I am a successful person" but she says it doesn't make sense to her as to how/why she's here in her life "I feel like I don't deserve to be happy"  Consider focusing on mindfulness for concentration, sleep hygiene to reduce PHQ-9 score   Believes in reincarnation, that consciousness persists  Check in with using more mindfulness work (aware of deep breathing, grounding)  Safety plan in 5/16/22 note    D: She says she rolled her ankle and has been off of it for about a week  Says she's going to take Tran to the vet soon  Says she got her first paycheck and "It felt really good"  Says she was able to make some delicious cookie dough with her new stand mixer  Explored a moment in which she started to feel "pissy" and began to message TJ in a manner that was "pissy"   "Things need to go perfectly or the whole thing is going to get fucked up and it's all gonna suck" CBT work through [employer] to her childhood, trauma This writer provided PCT through empathic listening, validation of feelings, reflection of content and feelings  A: July Blanco appeared to be in a euthymic mood with congruent affect, seemed to be experiencing irritability, lower mood, thoughts of hopelessness and worthlessness at times, appeared to be kempt, no SI/HI nor GOMEZ risk apparent or reported, seemed to respond well to PCT, CBT today  P:Begin CPT  "If things aren't right, I feel fear and then anger"  "Things need to go perfectly or the whole thing is going to get fucked up and it's all gonna suck" Discussed her stonewalling  Explored her history with it, with communication in her family  Explored power and control  "I was blamed for my parents' divorce" Focused on ABC worksheet in previous session, follow up, review, consider another together  Consider CPT work  Discuss moments in which she doesn't want to be touched, being harsh at times with TJ  Check in with considering the art of happiness, utilizing "I" statements when talking to One [facility]  "I would be a lot less angry", less jealous if she could let go of the judgement of others for having it "easier" than her growing up (Consider CPT socratic questioning)  Says she's realizing that parents taught her to not reach out to others for help, tried to keep things quiet within family  Check in with her applying for non-temp positions at [employer]  "I don't want to be angry" - cries saying this about being like her father  She says she does not remember 6 - 10  Relates disgust with physical affection, or reminded about her father, affection doesn't feel right due to how her father treated her, having to hug him after he picked out a hair from her head  Father treated her like "if I do something for you then you owe me", had inappropriate sexual discussions, complimented her when she was 11   "It was the only way I felt wanted" in adolescence to have sex, "that's what 'love' is", lasted for a couple years  Consider focusing on mindfulness for concentration, sleep hygiene to reduce PHQ-9 score   Believes in reincarnation, that consciousness persists  Check in with using more mindfulness work (aware of deep breathing, grounding)  Safety plan in 5/16/22 note         HPI     Past Medical History:   Diagnosis Date    Acne     ADHD (attention deficit hyperactivity disorder)     Borderline personality disorder ([facility] )     Depression     Fracture of carpal bones     Hypothyroidism     Hypothyroidism (acquired)     PTSD (post-traumatic stress disorder)     Suicide and self-inflicted injury ([facility] ) 04/4912    Suicide attempt ([place] [address] )        Past Surgical History:   Procedure Laterality Date    WISDOM TOOTH EXTRACTION         Current Outpatient Medications   Medication Sig Dispense Refill    albuterol (PROVENTIL HFA,VENTOLIN HFA) 90 mcg/act inhaler TAKE 2 PUFFS BY MOUTH EVERY 6 HOURS AS NEEDED FOR WHEEZE OR FOR SHORTNESS OF BREATH (Patient not taking: Reported on 5/25/2022) 6 7 g 5    fluticasone (FLONASE) 50 mcg/act nasal spray 1 spray into each nostril daily 1 Bottle 2    levothyroxine 50 mcg tablet Take 1 tablet (50 mcg total) by mouth daily 90 tablet 1    PARAGARD INTRAUTERINE COPPER IU 1 Device by Intrauterine route continuous       No current facility-administered medications for this visit  No Known Allergies    Review of Systems    Video Exam    There were no vitals filed for this visit  Physical Exam     I spent 54 minutes directly with the patient during this visit    VIRTUAL VISIT [facility] verbally agrees to participate in [employer]   Pt is aware that [employer] could be limited without vital signs or the ability to perform a full hands-on physical exam  Doris Burrell understands she or the provider may request at any time to terminate the video visit and request the patient to seek care or treatment in person

## 2022-08-02 ENCOUNTER — TELEMEDICINE (OUTPATIENT)
Dept: BEHAVIORAL/MENTAL HEALTH CLINIC | Facility: CLINIC | Age: 22
End: 2022-08-02
Payer: COMMERCIAL

## 2022-08-02 DIAGNOSIS — F33.1 MAJOR DEPRESSIVE DISORDER, RECURRENT EPISODE, MODERATE (HCC): Primary | ICD-10-CM

## 2022-08-02 DIAGNOSIS — Z86.59 HISTORY OF POSTTRAUMATIC STRESS DISORDER (PTSD): ICD-10-CM

## 2022-08-02 PROCEDURE — 90834 PSYTX W PT 45 MINUTES: CPT | Performed by: COUNSELOR

## 2022-08-02 NOTE — PSYCH
Virtual Regular Visit    Verification of patient location:    Patient is located in the following state in which I hold an active license [place]      Assessment/Plan:    Problem List Items Addressed This Visit    None     Visit Diagnoses     Major depressive disorder, recurrent episode, moderate ([facility] )    -  Primary    History of posttraumatic stress disorder (PTSD)              Goals addressed in session: Goal 1          Reason for visit is   Chief Complaint   Patient presents with    Virtual Regular Visit        Encounter provider Marcin Juarez    Provider located at [address] [place] [id number] [phone]      Recent Visits  No visits were found meeting these conditions  Showing recent visits within past 7 days and meeting all other requirements  Today's Visits  Date Type Provider Dept   08/02/22 [address] [place] [address] today's visits and meeting all other requirements  Future Appointments  No visits were found meeting these conditions  Showing future appointments within next 150 days and meeting all other requirements       The patient was identified by name and date of birth  Amanda Bailon was informed that this is a telemedicine visit and that the visit is being conducted throughEpic Embedded and patient was informed this is a secure, HIPAA-complaint platform  She agrees to proceed     My office door was closed  No one else was in the room  She acknowledged consent and understanding of privacy and security of the video platform  The patient has agreed to participate and understands they can discontinue the visit at any time  Patient is aware this is a billable service  Subjective  Amanda Bailon is a 20 y o  female     Time: 9:02am - 9:53am   Previous session plan: Begin CPT  "If things aren't right, I feel fear and then anger"   "Things need to go perfectly or the whole thing is going to get fucked up and it's all gonna suck" Discussed her stonewalling  Explored her history with it, with communication in her family  Explored power and control  "I was blamed for my parents' divorce" Focused on ABC worksheet in previous session, follow up, review, consider another together  Discuss moments in which she doesn't want to be touched, being harsh at times with TJ  Check in with considering the art of happiness, utilizing "I" statements when talking to One [facility]  "I would be a lot less angry", less jealous if she could let go of the judgement of others for having it "easier" than her growing up  Says she's realizing that parents taught her to not reach out to others for help, tried to keep things quiet within family   "I don't want to be angry" - cries saying this about being like her father  She says she does not remember 6 - 10  Relates disgust with physical affection, or reminded about her father, affection doesn't feel right due to how her father treated her, having to hug him after he picked out a hair from her head  Father treated her like "if I do something for you then you owe me", had inappropriate sexual discussions, complimented her when she was 11  "It was the only way I felt wanted" in adolescence to have sex, "that's what 'love' is", lasted for a couple years  Safety plan in 5/16/22 note    D: Reports she's been feeling generally well  She reports her job is going well  Says she did speak with TJ about stonewalling  She says she spoke about it, they hugged, "it felt good"  She reports she does experience intrusive thoughts, memories, nightmares, reports shame, sweating, shaky hands, difficulty speaking, dissociation, exaggerated startle response, hypervigilance  Reports attempting to avoid her symptoms by drinking, drugs, sex, stopping therapy  This writer provided CPT through psychoeducation     A: Dionna Shipman appeared to be in a neutral mood with congruent affect, seemed to be experiencing lower mood, intrusive memories, excessive worry, appeared to be kempt, no SI/HI nor GOMEZ risk apparent or reported, seemed to respond well to CPT today, engaged in psychoeducation work  P: Check in with writing impact statement  "If things aren't right, I feel fear and then anger"  "Things need to go perfectly or the whole thing is going to get fucked up and it's all gonna suck" Discussed her stonewalling  Explored her history with it, with communication in her family  Explored power and control  "I was blamed for my parents' divorce" Focused on ABC worksheet in previous session, follow up, review, consider another together  Discuss moments in which she doesn't want to be touched, being harsh at times with TJ  Check in with considering the art of happiness, utilizing "I" statements when talking to One [facility]  "I would be a lot less angry", less jealous if she could let go of the judgement of others for having it "easier" than her growing up  Says she's realizing that parents taught her to not reach out to others for help, tried to keep things quiet within family   "I don't want to be angry" - cries saying this about being like her father  She says she does not remember 6 - 10  Relates disgust with physical affection, or reminded about her father, affection doesn't feel right due to how her father treated her, having to hug him after he picked out a hair from her head  Father treated her like "if I do something for you then you owe me", had inappropriate sexual discussions, complimented her when she was 11  "It was the only way I felt wanted" in adolescence to have sex, "that's what 'love' is", lasted for a couple years   Safety plan in 5/16/22 note          HPI     Past Medical History:   Diagnosis Date    Acne     ADHD (attention deficit hyperactivity disorder)     Borderline personality disorder ([facility] )     Depression     Fracture of carpal bones     Hypothyroidism     Hypothyroidism (acquired)     PTSD (post-traumatic stress disorder)     Suicide and self-inflicted injury ([facility] [address] ) 33/5539    Suicide attempt [facility])        Past Surgical History:   Procedure Laterality Date    WISDOM TOOTH EXTRACTION         Current Outpatient Medications   Medication Sig Dispense Refill    albuterol (PROVENTIL HFA,VENTOLIN HFA) 90 mcg/act inhaler TAKE 2 PUFFS BY MOUTH EVERY 6 HOURS AS NEEDED FOR WHEEZE OR FOR SHORTNESS OF BREATH (Patient not taking: Reported on 5/25/2022) 6 7 g 5    fluticasone (FLONASE) 50 mcg/act nasal spray 1 spray into each nostril daily 1 Bottle 2    levothyroxine 50 mcg tablet Take 1 tablet (50 mcg total) by mouth daily 90 tablet 1    PARAGARD INTRAUTERINE COPPER IU 1 Device by Intrauterine route continuous       No current facility-administered medications for this visit  No Known Allergies    Review of Systems    Video Exam    There were no vitals filed for this visit  Physical Exam     I spent 51 minutes directly with the patient during this visit    VIRTUAL VISIT [facility] verbally agrees to participate in [employer]  Pt is aware that [employer] could be limited without vital signs or the ability to perform a full hands-on physical exam  Doris Burrell understands she or the provider may request at any time to terminate the video visit and request the patient to seek care or treatment in person

## 2022-08-03 ENCOUNTER — OFFICE VISIT (OUTPATIENT)
Dept: FAMILY MEDICINE CLINIC | Facility: CLINIC | Age: 22
End: 2022-08-03
Payer: COMMERCIAL

## 2022-08-03 VITALS
TEMPERATURE: 98.2 F | RESPIRATION RATE: 16 BRPM | HEART RATE: 92 BPM | BODY MASS INDEX: 25.68 KG/M2 | DIASTOLIC BLOOD PRESSURE: 62 MMHG | OXYGEN SATURATION: 98 % | SYSTOLIC BLOOD PRESSURE: 102 MMHG | WEIGHT: 150.4 LBS | HEIGHT: 64 IN

## 2022-08-03 DIAGNOSIS — S93.492A SPRAIN OF POSTERIOR TALOFIBULAR LIGAMENT OF LEFT ANKLE, INITIAL ENCOUNTER: Primary | ICD-10-CM

## 2022-08-03 PROCEDURE — 99213 OFFICE O/P EST LOW 20 MIN: CPT | Performed by: FAMILY MEDICINE

## 2022-08-03 NOTE — LETTER
August 3, 2022     Patient: Carrol Rodriguez  YOB: 2000  Date of Visit: 8/3/2022      To Whom it May Concern:    Artur Hua is under my professional care  Shirley Claros was seen in my office on 8/3/2022  Please allow patient to work remotely or offer feet the next 2 weeks while she heals from a injury  Patient may return back to unrestricted work on 8/17/22  If you have any questions or concerns, please don't hesitate to call           Sincerely,          Kevin Jose DO        CC: No Recipients

## 2022-08-03 NOTE — PROGRESS NOTES
Assessment/Plan:    Sprain of posterior talofibular ligament of left ankle  Based on history exam, patient most likely sprain the posterior talofibular ligament a of her left ankle  Would recommend resting, icing, compression and elevation  Also start Epson salt foot soaks 20-30 minutes daily to help improve inflammation  May take ibuprofen or Tylenol extra-strength 2 tabs 3 times a day as needed  Alternate with ibuprofen, Aleve, Advil  Recommend the patient purchase ankle brace to help support the ankle further  Referral placed for physical therapy  Follow-up as needed       Diagnoses and all orders for this visit:    Sprain of posterior talofibular ligament of left ankle, initial encounter  -     Ambulatory Referral to Physical Therapy; Future        Subjective:   No chief complaint on file  Health Maintenance   Topic Date Due    Hepatitis C Screening  Never done    Hepatitis A Vaccine (2 of 2 - 2-dose series) 05/01/2017    Pneumococcal Vaccine: Pediatrics (0 to 5 Years) and At-Risk Patients (6 to 59 Years) (2 - PCV) 12/02/2020    DTaP,Tdap,and Td Vaccines (7 - Td or Tdap) 02/17/2021    PT PLAN OF CARE  12/02/2021    BMI: Followup Plan  02/23/2022    Influenza Vaccine (1) 09/01/2022    Chlamydia Screening  02/28/2023    Cervical Cancer Screening  03/20/2023    Annual Physical  05/25/2023    BMI: Adult  08/03/2023    HIV Screening  Completed    HIB Vaccine  Completed    Hepatitis B Vaccine  Completed    IPV Vaccine  Completed    Meningococcal ACWY Vaccine  Completed    HPV Vaccine  Completed    COVID-19 Vaccine  Completed        Patient ID: Tasia Medina is a 20 y o  female  HPI    Patient reports having left ankle sprain on the lateral aspect roughly 2 and half weeks ago while playing basketball  States that she has tried resting, icing, compressions and elevation  Some improvement with intervention  Worsens with movement  No other trauma or surgical history      The following portions of the patient's history were reviewed and updated as appropriate: allergies, current medications, past family history, past medical history, past social history, past surgical history, and problem list     Review of Systems   Constitutional: Negative for chills and fever  Respiratory: Negative for cough  Gastrointestinal: Negative for nausea and vomiting  Musculoskeletal: Positive for arthralgias (left ankle sprain)  Negative for myalgias  Skin: Negative for rash  Neurological: Negative for dizziness and headaches  Objective:  /62 (BP Location: Left arm, Patient Position: Sitting, Cuff Size: Adult)   Pulse 92   Temp 98 2 °F (36 8 °C) (Tympanic)   Resp 16   Ht 5' 4" (1 626 m)   Wt 68 2 kg (150 lb 6 4 oz)   SpO2 98%   BMI 25 82 kg/m²      Physical Exam  Vitals reviewed  Constitutional:       General: She is not in acute distress  HENT:      Head: Normocephalic and atraumatic  Eyes:      Conjunctiva/sclera: Conjunctivae normal    Cardiovascular:      Rate and Rhythm: Normal rate and regular rhythm  Pulses: Normal pulses  Heart sounds: No murmur heard  Pulmonary:      Effort: Pulmonary effort is normal  No respiratory distress  Breath sounds: No wheezing or rales  Musculoskeletal:      Comments: Minimal amount of left ankle swelling especially in the lateral posterior aspect  Tenderness on palpation of lateral posterior aspect of ankle ligament  Dorsalis pedis and posterior tibial pulses intact bilaterally  Patient is able to flex, extend and invert  However inversion causes most amount of pain  Lymphadenopathy:      Cervical: No cervical adenopathy  Neurological:      Mental Status: She is alert  This note has been constructed using a voice recognition system  There may be translation, syntax, or grammatical errors  If you have an questions, please contact the dictating provider

## 2022-08-03 NOTE — ASSESSMENT & PLAN NOTE
Based on history exam, patient most likely sprain the posterior talofibular ligament a of her left ankle  Would recommend resting, icing, compression and elevation  Also start Epson salt foot soaks 20-30 minutes daily to help improve inflammation  May take ibuprofen or Tylenol extra-strength 2 tabs 3 times a day as needed  Alternate with ibuprofen, Aleve, Advil  Recommend the patient purchase ankle brace to help support the ankle further  Referral placed for physical therapy    Follow-up as needed

## 2022-08-03 NOTE — PATIENT INSTRUCTIONS
Please continue with resting, icing, compression and elevation  Use Epson salt bath soaks for 20 30 minutes twice a day as needed for ankle swelling  You may try Tylenol extra-strength 2 tabs 3 times a day as needed alternate with Advil/Aleve  Also consider making appointment for physical therapy to help with strengthening exercises  No improvement in 6 weeks please call office and will refer to orthopedics/podiatry

## 2022-08-08 ENCOUNTER — TELEMEDICINE (OUTPATIENT)
Dept: BEHAVIORAL/MENTAL HEALTH CLINIC | Facility: CLINIC | Age: 22
End: 2022-08-08
Payer: COMMERCIAL

## 2022-08-08 DIAGNOSIS — Z86.59 HISTORY OF POSTTRAUMATIC STRESS DISORDER (PTSD): ICD-10-CM

## 2022-08-08 DIAGNOSIS — F33.1 MAJOR DEPRESSIVE DISORDER, RECURRENT EPISODE, MODERATE (HCC): Primary | ICD-10-CM

## 2022-08-08 PROCEDURE — 90834 PSYTX W PT 45 MINUTES: CPT | Performed by: COUNSELOR

## 2022-08-08 NOTE — PSYCH
Psychotherapy Provided: Individual Psychotherapy 45 minutes     Length of time in session: 54 minutes, follow up in 1 week    Encounter Diagnosis     ICD-10-CM    1  Major depressive disorder, recurrent episode, moderate (HCC)  F33 1    2  History of posttraumatic stress disorder (PTSD)  Z86 59        Goals addressed in session: Goal 1     Pain:      none    0    Current suicide risk : Low     Time: 10:01am - 10:55am  Previous session plan: Check in with writing impact statement  "If things aren't right, I feel fear and then anger"  "Things need to go perfectly or the whole thing is going to get fucked up and it's all gonna suck" Discussed her stonewalling  Explored her history with it, with communication in her family  Explored power and control  "I was blamed for my parents' divorce" Focused on ABC worksheet in previous session, follow up, review, consider another together  Discuss moments in which she doesn't want to be touched, being harsh at times with TJ  Check in with considering the art of happiness, utilizing "I" statements when talking to [facility]  "I would be a lot less angry", less jealous if she could let go of the judgement of others for having it "easier" than her growing up  Says she's realizing that parents taught her to not reach out to others for help, tried to keep things quiet within family   "I don't want to be angry" - cries saying this about being like her father  She says she does not remember 6 - 10   Relates disgust with physical affection, or reminded about her father, affection doesn't feel right due to how her father treated her, having to hug him after he picked out a hair from her head  Father treated her like "if I do something for you then you owe me", had inappropriate sexual discussions, complimented her when she was 11  "It was the only way I felt wanted" in adolescence to have sex, "that's what 'love' is", lasted for a couple years   Safety plan in 5/16/22 note    D: She did complete her impact statement  "Deep down I know it's not my fault"  Discussed how she was often placed in a double bind, made to feel bad for just existing  Focused on session 1 of CPT work together  Discussed her dream she had last night  "I felt sad, desperate, scared"  "I went through the mirror, ended up in a theater, saw my friend Arely Neil  "I wasn't allowed to look at it, if I didn't look at it didn't attack you"  Processed this dream with Johanna Gaffney today regarding its relation to her trauma, her grief, how profound the content and feelings are  This writer provided PCT, CPT work through dream work, psychoeducation, processing impact statement  A: Serene Breaker appeared to be in a neutral mood with congruent affect, seemed to be experiencing thoughts of hopelessness and worthlessness, excessive worry, racing thoughts, intrusive memories, nightmares, insomnia, appeared to be kempt, no SI/HI nor GOMEZ risk apparent or reported, seemed to respond well to PCT, CPT work, completed all homework  P: Begin session 2 of CPT  CONTINUED: Discuss moments in which she doesn't want to be touched, being harsh at times with TJ  Check in with considering the art of happiness, utilizing "I" statements when talking to One [facility]  "I would be a lot less angry", less jealous if she could let go of the judgement of others for having it "easier" than her growing up  Says she's realizing that parents taught her to not reach out to others for help, tried to keep things quiet within family   "I don't want to be angry" - cries saying this about being like her father  She says she does not remember 6 - 10   Relates disgust with physical affection, or reminded about her father, affection doesn't feel right due to how her father treated her, having to hug him after he picked out a hair from her head  Father treated her like "if I do something for you then you owe me", had inappropriate sexual discussions, complimented her when she was 11   "It was the only way I felt wanted" in adolescence to have sex, "that's what 'love' is", lasted for a couple years  Safety plan in 5/16/22 note      [address]: Diagnosis and Treatment Plan explained to Jennifer Buitrago relates understanding diagnosis and is agreeable to Treatment Plan  Yes     Telemedicine consent    Patient: Kourtney Valerio  Provider: Monica Doll  Provider located at [address] [place] [id number] [phone]    The patient was identified by name and date of birth  Kourtney Valerio was informed that this is a telemedicine visit and that the visit is being conducted through [address] and patient was informed this is a secure, HIPAA-complaint platform  She agrees to proceed     My office door was closed  No one else was in the room  She acknowledged consent and understanding of privacy and security of the video platform  The patient has agreed to participate and understands they can discontinue the visit at any time  Patient is aware this is a billable service  I spent 54 minutes with the patient during this visit

## 2022-08-08 NOTE — PSYCH
Psychotherapy Provided: Individual Psychotherapy 45 minutes     Length of time in session: 45 minutes, follow up in 1 week    Encounter Diagnosis     ICD-10-CM    1  Major depressive disorder, recurrent episode, moderate (HCC)  F33 1    2  History of posttraumatic stress disorder (PTSD)  Z86 59        Goals addressed in session: Goal 1     Pain:      none    0    Current suicide risk : Low     Time: 10:01am - 10:47am  Previous session plan: Check in with writing impact statement  "If things aren't right, I feel fear and then anger"  "Things need to go perfectly or the whole thing is going to get fucked up and it's all gonna suck" Discussed her stonewalling  Explored her history with it, with communication in her family  Explored power and control  "I was blamed for my parents' divorce" Focused on ABC worksheet in previous session, follow up, review, consider another together  Discuss moments in which she doesn't want to be touched, being harsh at times with TJ  Check in with considering the art of happiness, utilizing "I" statements when talking to One [facility]  "I would be a lot less angry", less jealous if she could let go of the judgement of others for having it "easier" than her growing up  Says she's realizing that parents taught her to not reach out to others for help, tried to keep things quiet within family   "I don't want to be angry" - cries saying this about being like her father  She says she does not remember 6 - 10   Relates disgust with physical affection, or reminded about her father, affection doesn't feel right due to how her father treated her, having to hug him after he picked out a hair from her head  Father treated her like "if I do something for you then you owe me", had inappropriate sexual discussions, complimented her when she was 11  "It was the only way I felt wanted" in adolescence to have sex, "that's what 'love' is", lasted for a couple years   Safety plan in 5/16/22 note    D: She did complete her impact statement  "Deep down I know it's not my fault"  Discussed how she was often placed in a double bind, made to feel bad for just existing  Focused on session 1 of CPT work together  Discussed her dream she had last night  "I felt sad, desperate, scared"  "I went through the mirror, ended up in a theater, saw my friend Usha Oliverosded  "I wasn't allowed to look at it, if I didn't look at it didn't attack you"  Processed this dream with Gerson Milton today regarding its relation to her trauma, her grief, how profound the content and feelings are  This writer provided PCT, CPT work through dream work, psychoeducation, processing impact statement  A: Ronnell Martin appeared to be in a neutral mood with congruent affect, seemed to be experiencing thoughts of hopelessness and worthlessness, excessive worry, racing thoughts, intrusive memories, nightmares, insomnia, appeared to be kempt, no SI/HI nor GOMEZ risk apparent or reported, seemed to respond well to PCT, CPT work, completed all homework  P: Begin session 2 of CPT  CONTINUED: Discuss moments in which she doesn't want to be touched, being harsh at times with TJ  Check in with considering the art of happiness, utilizing "I" statements when talking to One [facility]  "I would be a lot less angry", less jealous if she could let go of the judgement of others for having it "easier" than her growing up  Says she's realizing that parents taught her to not reach out to others for help, tried to keep things quiet within family   "I don't want to be angry" - cries saying this about being like her father  She says she does not remember 6 - 10   Relates disgust with physical affection, or reminded about her father, affection doesn't feel right due to how her father treated her, having to hug him after he picked out a hair from her head  Father treated her like "if I do something for you then you owe me", had inappropriate sexual discussions, complimented her when she was 11   "It was the only way I felt wanted" in adolescence to have sex, "that's what 'love' is", lasted for a couple years  Safety plan in 5/16/22 note        [address]: Diagnosis and Treatment Plan explained to Laura Sadler relates understanding diagnosis and is agreeable to Treatment Plan   Yes

## 2022-08-15 ENCOUNTER — TELEMEDICINE (OUTPATIENT)
Dept: BEHAVIORAL/MENTAL HEALTH CLINIC | Facility: CLINIC | Age: 22
End: 2022-08-15
Payer: COMMERCIAL

## 2022-08-15 DIAGNOSIS — F33.1 MAJOR DEPRESSIVE DISORDER, RECURRENT EPISODE, MODERATE (HCC): Primary | ICD-10-CM

## 2022-08-15 DIAGNOSIS — Z86.59 HISTORY OF POSTTRAUMATIC STRESS DISORDER (PTSD): ICD-10-CM

## 2022-08-15 PROCEDURE — 90834 PSYTX W PT 45 MINUTES: CPT | Performed by: COUNSELOR

## 2022-08-15 NOTE — PSYCH
Virtual Regular Visit    Verification of patient location:    Patient is located in the following state in which I hold an active license [place]      Assessment/Plan:    Problem List Items Addressed This Visit    None     Visit Diagnoses     Major depressive disorder, recurrent episode, moderate ([facility] )    -  Primary    History of posttraumatic stress disorder (PTSD)              Goals addressed in session: Goal 1          Reason for visit is   Chief Complaint   Patient presents with    Virtual Regular Visit        Encounter provider Tien Velazquez    Provider located at [address] [place] [id number] [phone]      Recent Visits  Date Type Provider Dept   08/08/22 [address] [place] [address]   Showing recent visits within past 7 days and meeting all other requirements  Today's Visits  Date Type Provider Dept   08/15/22 Telemedicine [facility] today's visits and meeting all other requirements  Future Appointments  No visits were found meeting these conditions  Showing future appointments within next 150 days and meeting all other requirements       The patient was identified by name and date of birth  Aileen Kaur was informed that this is a telemedicine visit and that the [facility] and patient was informed this is a secure, HIPAA-complaint platform  She agrees to proceed     My office door was closed  No one else was in the room  She acknowledged consent and understanding of privacy and security of the video platform  The patient has agreed to participate and understands they can discontinue the visit at any time  Patient is aware this is a billable service       Subjective  Aileen Kaur is a 20 y o  female        Time: 9:00am - 9:43am  Previous session plan: Begin session 2 of CPT  CONTINUED: Discuss moments in which she doesn't want to be touched, being harsh at times with TJ  Check in with considering the art of happiness, utilizing "I" statements when talking to One [facility]  "I would be a lot less angry", less jealous if she could let go of the judgement of others for having it "easier" than her growing up  Says she's realizing that parents taught her to not reach out to others for help, tried to keep things quiet within family   "I don't want to be angry" - cries saying this about being like her father  She says she does not remember 6 - 10   Relates disgust with physical affection, or reminded about her father, affection doesn't feel right due to how her father treated her, having to hug him after he picked out a hair from her head  Father treated her like "if I do something for you then you owe me", had inappropriate sexual discussions, complimented her when she was 11  "It was the only way I felt wanted" in adolescence to have sex, "that's what 'love' is", lasted for a couple years  Safety plan in 5/16/22 note    D: Says she had a difficult weekend  Says she saw from friends from her past  Says that she did end up getting an invite to a [employer]  Says that she and TJ are doing well, they got into an argument, but they worked through it  Says she rephrased, reduced blame and worked through it through improved communication  Focused on the [facility] worksheet she completed, then on the importance of her completing her homework  Says she's going to visit her friend in [place] soon, teaching herself to sow for costume  This writer provided PCT through empathic listening, validation of feelings, reflection of content and feelings, CPT through focusing on session 2 through psychoeducation     A: July Blanco appeared to be in a mildly euthymic mood with congruent affect, seemed to be experiencing intrusive memories, nightmares at times, excessive worry, lower mood, thoughts of worthlessness, appeared to be kempt, no SI/HI nor GOMEZ risk apparent or reported, seemed to respond well to PCT, mildly well to CPT work, agrees to complete homework  P: Begin session 4 of CPT  CONTINUED: Says she's going to visit her friend in [place] soon, teaching herself to sow for costume  Discuss moments in which she doesn't want to be touched, being harsh at times with TJ  Check in with considering the art of happiness, utilizing "I" statements when talking to [facility]  "I would be a lot less angry", less jealous if she could let go of the judgement of others for having it "easier" than her growing up  Says she's realizing that parents taught her to not reach out to others for help, tried to keep things quiet within family   "I don't want to be angry" - cries saying this about being like her father  She says she does not remember 6 - 10   Relates disgust with physical affection, or reminded about her father, affection doesn't feel right due to how her father treated her, having to hug him after he picked out a hair from her head  Father treated her like "if I do something for you then you owe me", had inappropriate sexual discussions, complimented her when she was 11  "It was the only way I felt wanted" in adolescence to have sex, "that's what 'love' is", lasted for a couple years   Safety plan in 5/16/22 note          HPI     Past Medical History:   Diagnosis Date    Acne     ADHD (attention deficit hyperactivity disorder)     [facility]     Depression     Fracture of carpal bones     Hypothyroidism     Hypothyroidism (acquired)     PTSD (post-traumatic stress disorder)     Suicide and self-inflicted injury ([facility] ) [id number]    Suicide attempt [facility])        Past Surgical History:   Procedure Laterality Date    WISDOM TOOTH EXTRACTION         Current Outpatient Medications   Medication Sig Dispense Refill    albuterol (PROVENTIL HFA,VENTOLIN HFA) 90 mcg/act inhaler TAKE 2 PUFFS BY MOUTH EVERY 6 HOURS AS NEEDED FOR WHEEZE OR FOR SHORTNESS OF BREATH 6 7 g 5    fluticasone (FLONASE) 50 mcg/act nasal spray 1 spray into each nostril daily 1 Bottle 2    levothyroxine 50 mcg tablet Take 1 tablet (50 mcg total) by mouth daily 90 tablet 1    PARAGARD INTRAUTERINE COPPER IU 1 Device by Intrauterine route continuous       No current facility-administered medications for this visit  No Known Allergies    Review of Systems    Video Exam    There were no vitals filed for this visit      Physical Exam     I spent 43 minutes directly with the patient during this visit

## 2022-08-22 ENCOUNTER — TELEMEDICINE (OUTPATIENT)
Dept: BEHAVIORAL/MENTAL HEALTH CLINIC | Facility: CLINIC | Age: 22
End: 2022-08-22
Payer: COMMERCIAL

## 2022-08-22 DIAGNOSIS — F43.10 POST TRAUMATIC STRESS DISORDER (PTSD): Primary | ICD-10-CM

## 2022-08-22 DIAGNOSIS — F33.1 MAJOR DEPRESSIVE DISORDER, RECURRENT EPISODE, MODERATE (HCC): ICD-10-CM

## 2022-08-22 PROCEDURE — 90834 PSYTX W PT 45 MINUTES: CPT | Performed by: COUNSELOR

## 2022-08-22 NOTE — PSYCH
Virtual Regular Visit    Verification of patient location:    Patient is located in the following state in which I hold an active license [place]      Assessment/Plan:    Problem List Items Addressed This Visit    None     Visit Diagnoses     Post traumatic stress disorder (PTSD)    -  Primary    Major depressive disorder, recurrent episode, moderate ([facility] [address] )              Goals addressed in session: Goal 1          Reason for visit is   Chief Complaint   Patient presents with    Virtual Regular Visit        Encounter provider Renny Douglas    Provider located at [address] [place] [id number] [phone]      Recent Visits  Date Type Provider Dept   08/15/22 [address] [place] [address]   Showing recent visits within past 7 days and meeting all other requirements  Today's Visits  Date Type Provider Dept   08/22/22 Telemedicine [facility] today's visits and meeting all other requirements  Future Appointments  No visits were found meeting these conditions  Showing future appointments within next 150 days and meeting all other requirements       The patient was identified by name and date of birth  Aleena Waddell was informed that this is a telemedicine visit and [facility] the [facility] and patient was informed this is a secure, HIPAA-complaint platform  She agrees to proceed     My office door was closed  No one else was in the room  She acknowledged consent and understanding of privacy and security of the video platform  The patient has agreed to participate and understands they can discontinue the visit at any time  Patient is aware this is a billable service  Subjective  Aleena Waddell is a 20 y o  female     Time: 11:32am - 12:15pm  Previous session plan: Begin session 4 of CPT  CONTINUED: Says she's going to visit her friend in [place] soon, teaching herself to sow for costume  Discuss moments in which she doesn't want to be touched, being harsh at times with TJ  Check in with considering the art of happiness, utilizing "I" statements when talking to [facility]  "I would be a lot less angry", less jealous if she could let go of the judgement of others for having it "easier" than her growing up  Says she's realizing that parents taught her to not reach out to others for help, tried to keep things quiet within family   "I don't want to be angry" - cries saying this about being like her father  She says she does not remember 5 - 10   Relates disgust with physical affection, or reminded about her father, affection doesn't feel right due to how her father treated her, having to hug him after he picked out a hair from her head  Father treated her like "if I do something for you then you owe me", had inappropriate sexual discussions, complimented her when she was 11  "It was the only way I felt wanted" in adolescence to have sex, "that's what 'love' is", lasted for a couple years  Safety plan in 5/16/22 note    D: Focused on ABC worksheets with Chrissy Villela  Noted that her symptoms have been reduced apparent by her PCL-5 report (39 today)  Strong focus today on reframing work  Going through ABC  "Honestly going through the activities has been helpful"  This writer provided CPT intervention through psychoeducation and socratic dialogue  A: Mary Urbina appeared to be in a mildly euthymic mood with congruent affect, seemed to be experiencing improved symptoms of PTSD through reduced excessive guilt, reduced intrusive memories, continues with sleep disturbances and nightmares, lower mood at times, appeared to be kempt, no SI/HI nor GOMEZ risk apparent or reported, seemed to respond well to CPT work today, engaged in her homework     P: "It's like changing the channel" she says that about thinking her reframed thoughts  Begin session 5 of CPT  CONTINUED: Says she's going to visit her friend in [place] soon, teaching herself to sow for costume  Discuss moments in which she doesn't want to be touched, being harsh at times with TJ  Check in with considering the art of happiness, utilizing "I" statements when talking to [facility]  "I would be a lot less angry", less jealous if she could let go of the judgement of others for having it "easier" than her growing up  Says she's realizing that parents taught her to not reach out to others for help, tried to keep things quiet within family   "I don't want to be angry" - cries saying this about being like her father  She says she does not remember 5 - 10   Relates disgust with physical affection, or reminded about her father, affection doesn't feel right due to how her father treated her, having to hug him after he picked out a hair from her head  Father treated her like "if I do something for you then you owe me", had inappropriate sexual discussions, complimented her when she was 11  "It was the only way I felt wanted" in adolescence to have sex, "that's what 'love' is", lasted for a couple years   Safety plan in 5/16/22 note          HPI     Past Medical History:   Diagnosis Date    Acne     ADHD (attention deficit hyperactivity disorder)     Borderline personality disorder [facility] [address] )     Depression     Fracture of carpal bones     Hypothyroidism     Hypothyroidism (acquired)     PTSD (post-traumatic stress disorder)     Suicide and self-inflicted injury ([facility] ) 38/3448    [facility]        Past Surgical History:   Procedure Laterality Date    WISDOM TOOTH EXTRACTION         Current Outpatient Medications   Medication Sig Dispense Refill    albuterol (PROVENTIL HFA,VENTOLIN HFA) 90 mcg/act inhaler TAKE 2 PUFFS BY MOUTH EVERY 6 HOURS AS NEEDED FOR WHEEZE OR FOR SHORTNESS OF BREATH 6 7 g 5    fluticasone (FLONASE) 50 mcg/act nasal spray 1 spray into each nostril daily 1 Bottle 2    levothyroxine 50 mcg tablet Take 1 tablet (50 mcg total) by mouth daily 90 tablet 1    PARAGARD INTRAUTERINE COPPER IU 1 Device by Intrauterine route continuous       No current facility-administered medications for this visit  No Known Allergies    Review of Systems    Video Exam    There were no vitals filed for this visit      Physical Exam     I spent 43 minutes directly with the patient during this visit

## 2022-08-24 ENCOUNTER — TELEPHONE (OUTPATIENT)
Dept: PSYCHIATRY | Facility: CLINIC | Age: 22
End: 2022-08-24

## 2022-08-24 NOTE — TELEPHONE ENCOUNTER
Called and lvm for patient to call the office regarding her insurance  Her father's demographics need to be updated in patients chart in order to bill insurance for visit on 9/1/22  Please update information when patient calls back

## 2022-09-01 ENCOUNTER — OFFICE VISIT (OUTPATIENT)
Dept: PSYCHIATRY | Facility: CLINIC | Age: 22
End: 2022-09-01
Payer: COMMERCIAL

## 2022-09-01 DIAGNOSIS — F41.1 GENERALIZED ANXIETY DISORDER: ICD-10-CM

## 2022-09-01 DIAGNOSIS — F60.3 BORDERLINE PERSONALITY DISORDER (HCC): ICD-10-CM

## 2022-09-01 DIAGNOSIS — F90.2 ADHD (ATTENTION DEFICIT HYPERACTIVITY DISORDER), COMBINED TYPE: ICD-10-CM

## 2022-09-01 DIAGNOSIS — F43.10 PTSD (POST-TRAUMATIC STRESS DISORDER): Primary | ICD-10-CM

## 2022-09-01 PROCEDURE — 90792 PSYCH DIAG EVAL W/MED SRVCS: CPT

## 2022-09-01 RX ORDER — HYDROXYZINE PAMOATE 25 MG/1
25 CAPSULE ORAL
Qty: 30 CAPSULE | Refills: 1 | Status: SHIPPED | OUTPATIENT
Start: 2022-09-01 | End: 2022-09-26 | Stop reason: SDUPTHER

## 2022-09-01 RX ORDER — LAMOTRIGINE 25 MG/1
25 TABLET ORAL
Qty: 42 TABLET | Refills: 0 | Status: SHIPPED | OUTPATIENT
Start: 2022-09-01 | End: 2022-09-26 | Stop reason: SDUPTHER

## 2022-09-01 NOTE — TELEPHONE ENCOUNTER
Patient was in the office and stated that she spoke with someone who updated the information  Writer verified that the information was in the chart and confirmed it with the patient
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
## 2022-09-07 ENCOUNTER — TELEPHONE (OUTPATIENT)
Dept: FAMILY MEDICINE CLINIC | Facility: CLINIC | Age: 22
End: 2022-09-07

## 2022-09-07 ENCOUNTER — TELEPHONE (OUTPATIENT)
Dept: PSYCHIATRY | Facility: CLINIC | Age: 22
End: 2022-09-07

## 2022-09-07 DIAGNOSIS — S93.492A SPRAIN OF POSTERIOR TALOFIBULAR LIGAMENT OF LEFT ANKLE, INITIAL ENCOUNTER: Primary | ICD-10-CM

## 2022-09-07 NOTE — TELEPHONE ENCOUNTER
Checked in with [facility], as she had cancelled for the day - she reports she's feeling generally well and has continued with CPT work  Had to cancel due to going into office today  She reports she is prepared for Monday's (9/12/2022) appointment

## 2022-09-07 NOTE — TELEPHONE ENCOUNTER
Patient saw Dr Mary Ford last month but she continues with the swelling and inflammation and she asked it she could have a referral to Orthopedic    She did buy the ankle brace, Physical therapy yet because she does her own stretching and exercises

## 2022-09-09 ENCOUNTER — HOSPITAL ENCOUNTER (OUTPATIENT)
Dept: RADIOLOGY | Facility: HOSPITAL | Age: 22
Discharge: HOME | End: 2022-09-09
Payer: COMMERCIAL

## 2022-09-09 DIAGNOSIS — S93.492A SPRAIN OF POSTERIOR TALOFIBULAR LIGAMENT OF LEFT ANKLE, INITIAL ENCOUNTER: ICD-10-CM

## 2022-09-09 PROCEDURE — 73610 X-RAY EXAM OF ANKLE: CPT

## 2022-09-12 ENCOUNTER — TELEMEDICINE (OUTPATIENT)
Dept: BEHAVIORAL/MENTAL HEALTH CLINIC | Facility: CLINIC | Age: 22
End: 2022-09-12
Payer: COMMERCIAL

## 2022-09-12 DIAGNOSIS — F43.10 POST TRAUMATIC STRESS DISORDER (PTSD): Primary | ICD-10-CM

## 2022-09-12 DIAGNOSIS — F33.1 MAJOR DEPRESSIVE DISORDER, RECURRENT EPISODE, MODERATE (HCC): ICD-10-CM

## 2022-09-12 PROCEDURE — 90834 PSYTX W PT 45 MINUTES: CPT | Performed by: COUNSELOR
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
## 2022-09-15 DIAGNOSIS — E03.9 PRIMARY HYPOTHYROIDISM: ICD-10-CM

## 2022-09-15 RX ORDER — LEVOTHYROXINE SODIUM 0.05 MG/1
50 TABLET ORAL DAILY
Qty: 90 TABLET | Refills: 1 | Status: SHIPPED | OUTPATIENT
Start: 2022-09-15

## 2022-09-19 ENCOUNTER — TELEMEDICINE (OUTPATIENT)
Dept: BEHAVIORAL/MENTAL HEALTH CLINIC | Facility: CLINIC | Age: 22
End: 2022-09-19
Payer: COMMERCIAL

## 2022-09-19 DIAGNOSIS — F43.10 POST TRAUMATIC STRESS DISORDER (PTSD): Primary | ICD-10-CM

## 2022-09-19 DIAGNOSIS — F33.1 MAJOR DEPRESSIVE DISORDER, RECURRENT EPISODE, MODERATE (HCC): ICD-10-CM

## 2022-09-19 PROCEDURE — 90834 PSYTX W PT 45 MINUTES: CPT | Performed by: COUNSELOR
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
## 2022-09-26 ENCOUNTER — TELEMEDICINE (OUTPATIENT)
Dept: BEHAVIORAL/MENTAL HEALTH CLINIC | Facility: CLINIC | Age: 22
End: 2022-09-26
Payer: COMMERCIAL

## 2022-09-26 ENCOUNTER — TELEMEDICINE (OUTPATIENT)
Dept: PSYCHIATRY | Facility: CLINIC | Age: 22
End: 2022-09-26
Payer: COMMERCIAL

## 2022-09-26 DIAGNOSIS — F43.10 POST TRAUMATIC STRESS DISORDER (PTSD): Primary | ICD-10-CM

## 2022-09-26 DIAGNOSIS — F43.10 PTSD (POST-TRAUMATIC STRESS DISORDER): Primary | ICD-10-CM

## 2022-09-26 DIAGNOSIS — F60.3 BORDERLINE PERSONALITY DISORDER (HCC): ICD-10-CM

## 2022-09-26 DIAGNOSIS — F41.1 GENERALIZED ANXIETY DISORDER: ICD-10-CM

## 2022-09-26 DIAGNOSIS — F39 MOOD DISORDER (HCC): ICD-10-CM

## 2022-09-26 DIAGNOSIS — F33.1 MAJOR DEPRESSIVE DISORDER, RECURRENT EPISODE, MODERATE (HCC): ICD-10-CM

## 2022-09-26 PROCEDURE — 99214 OFFICE O/P EST MOD 30 MIN: CPT

## 2022-09-26 PROCEDURE — 90834 PSYTX W PT 45 MINUTES: CPT | Performed by: COUNSELOR

## 2022-09-26 RX ORDER — LAMOTRIGINE 25 MG/1
50 TABLET ORAL
Qty: 60 TABLET | Refills: 1 | Status: SHIPPED | OUTPATIENT
Start: 2022-09-26

## 2022-09-26 RX ORDER — HYDROXYZINE PAMOATE 25 MG/1
25 CAPSULE ORAL
Qty: 60 CAPSULE | Refills: 1 | Status: SHIPPED | OUTPATIENT
Start: 2022-09-26
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
## 2022-10-04 ENCOUNTER — TELEPHONE (OUTPATIENT)
Dept: PSYCHIATRY | Facility: CLINIC | Age: 22
End: 2022-10-04

## 2022-10-04 ENCOUNTER — TELEPHONE (OUTPATIENT)
Dept: BEHAVIORAL/MENTAL HEALTH CLINIC | Facility: CLINIC | Age: 22
End: 2022-10-04

## 2022-10-04 NOTE — TELEPHONE ENCOUNTER
NO-SHOW LETTER MAILED TO [address]    ADDRESS: [facility] [address]  [address] [place] [phone]

## 2022-10-05 ENCOUNTER — TELEPHONE (OUTPATIENT)
Dept: PSYCHIATRY | Facility: CLINIC | Age: 22
End: 2022-10-05

## 2022-10-05 NOTE — TELEPHONE ENCOUNTER
NO-SHOW LETTER MAILED TO [address]    [facility] [address]  [address] [place] [phone]

## 2022-10-10 ENCOUNTER — TELEMEDICINE (OUTPATIENT)
Dept: BEHAVIORAL/MENTAL HEALTH CLINIC | Facility: CLINIC | Age: 22
End: 2022-10-10
Payer: COMMERCIAL

## 2022-10-10 DIAGNOSIS — F33.1 MAJOR DEPRESSIVE DISORDER, RECURRENT EPISODE, MODERATE (HCC): ICD-10-CM

## 2022-10-10 DIAGNOSIS — F43.10 POST TRAUMATIC STRESS DISORDER (PTSD): Primary | ICD-10-CM

## 2022-10-10 PROCEDURE — 90834 PSYTX W PT 45 MINUTES: CPT | Performed by: COUNSELOR
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
## 2022-10-17 ENCOUNTER — TELEMEDICINE (OUTPATIENT)
Dept: BEHAVIORAL/MENTAL HEALTH CLINIC | Facility: CLINIC | Age: 22
End: 2022-10-17
Payer: COMMERCIAL

## 2022-10-17 DIAGNOSIS — F43.10 POST TRAUMATIC STRESS DISORDER (PTSD): Primary | ICD-10-CM

## 2022-10-17 DIAGNOSIS — F33.1 MAJOR DEPRESSIVE DISORDER, RECURRENT EPISODE, MODERATE (HCC): ICD-10-CM

## 2022-10-17 PROCEDURE — 90834 PSYTX W PT 45 MINUTES: CPT | Performed by: COUNSELOR
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
## 2022-10-24 ENCOUNTER — TELEMEDICINE (OUTPATIENT)
Dept: BEHAVIORAL/MENTAL HEALTH CLINIC | Facility: CLINIC | Age: 22
End: 2022-10-24

## 2022-10-24 DIAGNOSIS — F43.10 POST TRAUMATIC STRESS DISORDER (PTSD): Primary | ICD-10-CM

## 2022-10-24 DIAGNOSIS — Z72.0 NICOTINE USE: ICD-10-CM
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
## 2022-10-30 DIAGNOSIS — F60.3 BORDERLINE PERSONALITY DISORDER (HCC): ICD-10-CM

## 2022-10-30 DIAGNOSIS — F41.1 GENERALIZED ANXIETY DISORDER: ICD-10-CM

## 2022-10-31 ENCOUNTER — TELEMEDICINE (OUTPATIENT)
Dept: BEHAVIORAL/MENTAL HEALTH CLINIC | Facility: CLINIC | Age: 22
End: 2022-10-31

## 2022-10-31 DIAGNOSIS — Z72.0 NICOTINE USE: ICD-10-CM

## 2022-10-31 DIAGNOSIS — F43.10 POST TRAUMATIC STRESS DISORDER (PTSD): Primary | ICD-10-CM
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
## 2022-11-01 RX ORDER — LAMOTRIGINE 25 MG/1
50 TABLET ORAL
Qty: 180 TABLET | Refills: 1 | Status: SHIPPED | OUTPATIENT
Start: 2022-11-01

## 2022-11-01 RX ORDER — HYDROXYZINE PAMOATE 25 MG/1
CAPSULE ORAL
Qty: 180 CAPSULE | Refills: 1 | Status: SHIPPED | OUTPATIENT
Start: 2022-11-01

## 2022-11-07 ENCOUNTER — TELEMEDICINE (OUTPATIENT)
Dept: BEHAVIORAL/MENTAL HEALTH CLINIC | Facility: CLINIC | Age: 22
End: 2022-11-07

## 2022-11-07 DIAGNOSIS — F43.10 POST TRAUMATIC STRESS DISORDER (PTSD): Primary | ICD-10-CM

## 2022-11-07 DIAGNOSIS — Z72.0 NICOTINE USE: ICD-10-CM
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
## 2022-11-11 ENCOUNTER — APPOINTMENT (OUTPATIENT)
Dept: LAB | Facility: CLINIC | Age: 22
End: 2022-11-11

## 2022-11-11 ENCOUNTER — OFFICE VISIT (OUTPATIENT)
Dept: FAMILY MEDICINE CLINIC | Facility: CLINIC | Age: 22
End: 2022-11-11

## 2022-11-11 VITALS
HEIGHT: 64 IN | BODY MASS INDEX: 25.68 KG/M2 | RESPIRATION RATE: 16 BRPM | SYSTOLIC BLOOD PRESSURE: 100 MMHG | OXYGEN SATURATION: 97 % | HEART RATE: 77 BPM | DIASTOLIC BLOOD PRESSURE: 70 MMHG | WEIGHT: 150.4 LBS | TEMPERATURE: 99.2 F

## 2022-11-11 DIAGNOSIS — R10.11 RIGHT UPPER QUADRANT PAIN: ICD-10-CM

## 2022-11-11 DIAGNOSIS — R53.83 OTHER FATIGUE: Primary | ICD-10-CM

## 2022-11-11 DIAGNOSIS — Z23 FLU VACCINE NEED: ICD-10-CM

## 2022-11-11 DIAGNOSIS — R53.83 OTHER FATIGUE: ICD-10-CM

## 2022-11-11 DIAGNOSIS — E03.9 PRIMARY HYPOTHYROIDISM: ICD-10-CM

## 2022-11-11 LAB
ALBUMIN SERPL BCP-MCNC: 4 G/DL (ref 3.5–5)
ALP SERPL-CCNC: 42 U/L (ref 46–116)
ALT SERPL W P-5'-P-CCNC: 26 U/L (ref 12–78)
ANION GAP SERPL CALCULATED.3IONS-SCNC: 4 MMOL/L (ref 4–13)
AST SERPL W P-5'-P-CCNC: 14 U/L (ref 5–45)
BASOPHILS # BLD AUTO: 0.03 THOUSANDS/ÂΜL (ref 0–0.1)
BASOPHILS NFR BLD AUTO: 1 % (ref 0–1)
BILIRUB SERPL-MCNC: 0.78 MG/DL (ref 0.2–1)
BUN SERPL-MCNC: 11 MG/DL (ref 5–25)
CALCIUM SERPL-MCNC: 9 MG/DL (ref 8.3–10.1)
CHLORIDE SERPL-SCNC: 108 MMOL/L (ref 96–108)
CO2 SERPL-SCNC: 26 MMOL/L (ref 21–32)
CREAT SERPL-MCNC: 0.96 MG/DL (ref 0.6–1.3)
EOSINOPHIL # BLD AUTO: 0.11 THOUSAND/ÂΜL (ref 0–0.61)
EOSINOPHIL NFR BLD AUTO: 2 % (ref 0–6)
ERYTHROCYTE [DISTWIDTH] IN BLOOD BY AUTOMATED COUNT: 12.9 % (ref 11.6–15.1)
GFR SERPL CREATININE-BSD FRML MDRD: 83 ML/MIN/1.73SQ M
GLUCOSE P FAST SERPL-MCNC: 89 MG/DL (ref 65–99)
HCT VFR BLD AUTO: 39.5 % (ref 34.8–46.1)
HGB BLD-MCNC: 12.7 G/DL (ref 11.5–15.4)
IMM GRANULOCYTES # BLD AUTO: 0.01 THOUSAND/UL (ref 0–0.2)
IMM GRANULOCYTES NFR BLD AUTO: 0 % (ref 0–2)
LYMPHOCYTES # BLD AUTO: 2.87 THOUSANDS/ÂΜL (ref 0.6–4.47)
LYMPHOCYTES NFR BLD AUTO: 45 % (ref 14–44)
MCH RBC QN AUTO: 29.8 PG (ref 26.8–34.3)
MCHC RBC AUTO-ENTMCNC: 32.2 G/DL (ref 31.4–37.4)
MCV RBC AUTO: 93 FL (ref 82–98)
MONOCYTES # BLD AUTO: 0.48 THOUSAND/ÂΜL (ref 0.17–1.22)
MONOCYTES NFR BLD AUTO: 8 % (ref 4–12)
NEUTROPHILS # BLD AUTO: 2.86 THOUSANDS/ÂΜL (ref 1.85–7.62)
NEUTS SEG NFR BLD AUTO: 44 % (ref 43–75)
NRBC BLD AUTO-RTO: 0 /100 WBCS
PLATELET # BLD AUTO: 315 THOUSANDS/UL (ref 149–390)
PMV BLD AUTO: 10 FL (ref 8.9–12.7)
POTASSIUM SERPL-SCNC: 4.1 MMOL/L (ref 3.5–5.3)
PROT SERPL-MCNC: 7.2 G/DL (ref 6.4–8.4)
RBC # BLD AUTO: 4.26 MILLION/UL (ref 3.81–5.12)
SODIUM SERPL-SCNC: 138 MMOL/L (ref 135–147)
TSH SERPL DL<=0.05 MIU/L-ACNC: 2.11 UIU/ML (ref 0.45–4.5)
WBC # BLD AUTO: 6.36 THOUSAND/UL (ref 4.31–10.16)

## 2022-11-11 NOTE — PROGRESS NOTES
Assessment/Plan:    No problem-specific Assessment & Plan notes found for this encounter  Diagnoses and all orders for this visit:    Other fatigue  Comments:  Check TSH and CBC  Orders:  -     CBC and differential; Future  -     Comprehensive metabolic panel; Future  -     TSH, 3rd generation with Free T4 reflex; Future  -     Celiac Disease Antibody Profile; Future    Right upper quadrant pain  Comments:  Check US and celiac panel  Orders:  -     US right upper quadrant; Future    Primary hypothyroidism  Comments:  Possible subclinical as she is well controlled on a small dosage levothyroxine- check ab  Orders:  -     Anti-microsomal antibody; Future        Subjective: sick visit     Patient ID: Abbey Morris is a 20 y o  female  HPI  Here for multiple complaints  1) constipation: Small hard stools for several days followed by a large BM that causes lightheadedness and right lower quad pain  2) right upper abd pain about 1 hr after eating  Symptoms are nagging and dull occasionally associated with nausea  The symptoms last a couple hrs  She has been tracking which foods worsen symptoms and she noticed an exacerbation with bread  3) fatigue: for a year  She has not noticed a change in her weight  She noticed her hair is more coarse  The following portions of the patient's history were reviewed and updated as appropriate: allergies, current medications, past family history, past medical history, past social history, past surgical history and problem list     Review of Systems   Constitutional: Negative for fever and unexpected weight change  HENT: Negative for ear pain, sore throat and trouble swallowing  Eyes: Negative for pain and visual disturbance  Respiratory: Negative for cough, chest tightness, shortness of breath and wheezing  Cardiovascular: Negative for chest pain     Gastrointestinal: Positive for abdominal distention, abdominal pain, constipation, diarrhea and nausea  Negative for blood in stool and vomiting  Endocrine: Negative for polydipsia and polyuria  Genitourinary: Negative for dysuria and hematuria  Musculoskeletal: Negative for back pain and myalgias  Skin: Negative for rash  Neurological: Negative for syncope and headaches  Psychiatric/Behavioral: Negative for suicidal ideas  PHQ-2/9 Depression Screening         [unfilled]    Objective:      /70 (BP Location: Left arm, Patient Position: Sitting, Cuff Size: Adult)   Pulse 77   Temp 99 2 °F (37 3 °C) (Tympanic)   Resp 16   Ht 5' 4" (1 626 m)   Wt 68 2 kg (150 lb 6 4 oz)   SpO2 97%   BMI 25 82 kg/m²          Physical Exam  Constitutional:       Appearance: She is well-developed  HENT:      Head: Normocephalic and atraumatic  Right Ear: External ear normal       Left Ear: External ear normal       Mouth/Throat:      Pharynx: No oropharyngeal exudate  Eyes:      General: No scleral icterus  Conjunctiva/sclera: Conjunctivae normal       Pupils: Pupils are equal, round, and reactive to light  Cardiovascular:      Rate and Rhythm: Normal rate and regular rhythm  Heart sounds: No murmur heard  No friction rub  No gallop  Pulmonary:      Effort: Pulmonary effort is normal  No respiratory distress  Breath sounds: Normal breath sounds  No wheezing or rales  Abdominal:      General: Bowel sounds are normal  There is no distension  Palpations: Abdomen is soft  There is no mass  Tenderness: There is no abdominal tenderness  There is no guarding or rebound  Musculoskeletal:         General: Normal range of motion  Cervical back: Normal range of motion and neck supple  Skin:     General: Skin is warm and dry  Neurological:      Mental Status: She is alert and oriented to person, place, and time

## 2022-11-12 LAB — THYROPEROXIDASE AB SERPL-ACNC: <8 IU/ML (ref 0–34)

## 2022-11-14 ENCOUNTER — TELEMEDICINE (OUTPATIENT)
Dept: BEHAVIORAL/MENTAL HEALTH CLINIC | Facility: CLINIC | Age: 22
End: 2022-11-14

## 2022-11-14 DIAGNOSIS — Z72.0 NICOTINE USE: ICD-10-CM

## 2022-11-14 DIAGNOSIS — F43.10 POST TRAUMATIC STRESS DISORDER (PTSD): Primary | ICD-10-CM

## 2022-11-14 LAB
ENDOMYSIUM IGA SER QL: NEGATIVE
GLIADIN PEPTIDE IGA SER-ACNC: 3 UNITS (ref 0–19)
GLIADIN PEPTIDE IGG SER-ACNC: 1 UNITS (ref 0–19)
IGA SERPL-MCNC: 180 MG/DL (ref 87–352)
TTG IGA SER-ACNC: <2 U/ML (ref 0–3)
TTG IGG SER-ACNC: <2 U/ML (ref 0–5)

## 2022-11-14 NOTE — PSYCH
Virtual Regular Visit    Verification of patient location:    Patient is located in the following state in which I hold an active license [place]      Assessment/Plan:    Problem List Items Addressed This Visit    None     Visit Diagnoses     Post traumatic stress disorder (PTSD)    -  Primary    Nicotine use              Goals addressed in session: Goal 1          Reason for visit is   Chief Complaint   Patient presents with   • Virtual Regular Visit        Encounter provider Monica Doll    Provider located at [address] [place] [id number] [phone]      Recent Visits  Date Type Provider Dept   11/11/22 [facility]   11/07/22 [facility] [facility] Therapist [place]   Showing recent visits within past 7 days and meeting all other requirements  Today's Visits  Date Type Provider Dept   11/14/22 Telemedicine [facility] today's visits and meeting all other requirements  Future Appointments  No visits were found meeting these conditions  Showing future appointments within next 150 days and meeting all other requirements       The patient was identified by name and date of birth  Kourtney Valerio was informed that this is a telemedicine visit and [facility] the [facility]  She agrees to proceed     My office door was closed  No one else was in the room  She acknowledged consent and understanding of privacy and security of the video platform  The patient has agreed to participate and understands they can discontinue the visit at any time  Patient is aware this is a billable service       Subjective  Kourtney Valerio is a 20 y o  female     Previous session plan: Discuss her being "unreasonably angry" when others relax while she is working hard  "If I'm suffering others have to suffer too", she says she recognizes this as an untrue, upsetting thought she sometimes has  Explore power and control challenging beliefs worksheets, move into next module  Katharina Main in with considering the art of happiness, utilizing "I" statements when talking to [facility]  "I would be a lot less angry", less jealous if she could let go of the judgement of others for having it "easier" than her growing up  Relates disgust with physical affection, or reminded about her father, affection doesn't feel right due to how her father treated her, having to hug him after he picked out a hair from her head  Safety plan in 5/16/22 note    D: "Just because he's relaxing in this moment, doesn't mean he doesn't do anything"  She says she hasn't had an "unreasonably angry" moments  "If I feel bad, then why are you having a good time"  Strong socratic dialogue, exploration of black and white thinking today, discussion about "shoulds" and remaining skeptical  Explored power and control worksheets  Reduced avoidance apparent  This writer provided CPT through psychoeducation, validation of feelings, reflection of content and feelings, socratic dialogue, psychoeducation  A: Mahsa Cyr appeared to be in a neutral mood with congruent affect, seemed to be experiencing lower mood, excessive worry, intrusive memories, flashbacks, thoughts of worthlessness at times, avoidance thoughts and behaviors, appeared to be kempt, no SI/HI nor GOMEZ risk apparent or reported, seemed to respond well to CPT work, completed homework  P: Discuss esteem work  Check in with considering talking to her father again about her childhood, told him "I'd just like him to listen"   Katharina Main in with considering the art of happiness, utilizing "I" statements when talking to [facility]  "I would be a lot less angry", less jealous if she could let go of the judgement of others for having it "easier" than her growing up  Relates disgust with physical affection, or reminded about her father, affection doesn't feel right due to how her father treated her, having to hug him after he picked out a hair from her head  Safety plan in 5/16/22 note            HPI     Past Medical History:   Diagnosis Date   • Acne    • ADHD (attention deficit hyperactivity disorder)    • Borderline personality disorder ([facility] )    • Depression    • Fracture of carpal bones    • Hypothyroidism    • Hypothyroidism (acquired)    • PTSD (post-traumatic stress disorder)    • Suicide and self-inflicted injury ([facility] 75 ) 01/6930   • Suicide attempt [facility])        Past Surgical History:   Procedure Laterality Date   • WISDOM TOOTH EXTRACTION         Current Outpatient Medications   Medication Sig Dispense Refill   • albuterol (PROVENTIL HFA,VENTOLIN HFA) 90 mcg/act inhaler TAKE 2 PUFFS BY MOUTH EVERY 6 HOURS AS NEEDED FOR WHEEZE OR FOR SHORTNESS OF BREATH (Patient not taking: Reported on 11/11/2022) 6 7 g 5   • fluticasone (FLONASE) 50 mcg/act nasal spray 1 spray into each nostril daily 1 Bottle 2   • hydrOXYzine pamoate (VISTARIL) 25 mg capsule TAKE 1-2 CAPSULES AT BEDTIME AS NEEDED FOR SLEEP/ANXIETY (Patient not taking: Reported on 11/11/2022) 180 capsule 1   • lamoTRIgine (LaMICtal) 25 mg tablet TAKE 2 TABLETS (50 MG TOTAL) BY MOUTH DAILY AT BEDTIME (Patient not taking: Reported on 11/11/2022) 180 tablet 1   • levothyroxine 50 mcg tablet Take 1 tablet (50 mcg total) by mouth daily 90 tablet 1   • PARAGARD INTRAUTERINE COPPER IU 1 Device by Intrauterine route continuous       No current facility-administered medications for this visit  No Known Allergies    Review of Systems    Video Exam    There were no vitals filed for this visit      Physical Exam     11/14/22  Start Time: 1001  Stop Time: 1050  Total Visit Time: 49 minutes

## 2022-11-18 ENCOUNTER — TELEPHONE (OUTPATIENT)
Dept: FAMILY MEDICINE CLINIC | Facility: CLINIC | Age: 22
End: 2022-11-18

## 2022-11-21 ENCOUNTER — TELEMEDICINE (OUTPATIENT)
Dept: BEHAVIORAL/MENTAL HEALTH CLINIC | Facility: CLINIC | Age: 22
End: 2022-11-21

## 2022-11-21 DIAGNOSIS — F43.10 POST TRAUMATIC STRESS DISORDER (PTSD): Primary | ICD-10-CM

## 2022-11-21 NOTE — PSYCH
Psychotherapy Provided: Individual Psychotherapy 45 minutes     Length of time in session: 44 minutes, follow up in 1 week    Encounter Diagnosis     ICD-10-CM    1  Post traumatic stress disorder (PTSD)  F43 10           Goals addressed in session: Goal 1     Pain:      none    0    Current suicide risk : Low     Previous session plan: Discuss esteem work  Check in with considering talking to her father again about her childhood, told him "I'd just like him to listen"  Clinton Alexandra in with considering the art of happiness, utilizing "I" statements when talking to [facility]  "I would be a lot less angry", less jealous if she could let go of the judgement of others for having it "easier" than her growing up  Relates disgust with physical affection, or reminded about her father, affection doesn't feel right due to how her father treated her, having to hug him after he picked out a hair from her head  Safety plan in 5/16/22 note  D: Reports she's feeling well, went out with friends over the weekend  Focused on her completed challenging beliefs worksheets  Reports she feels she can be more empathetic lately, compassionate  This writer provided CPT through discussion about her experiences with intimacy and esteem, psychoeducation, empathic listening  A: Belinda Cain appeared to be in a euthymic mood with congruent affect, seemed to be experiencing signifcantly reduced symptoms of depression, PTSD symptoms, appeared to be kempt, no SI/HI nor GOMEZ risk apparent or reported, seemed to respond well to CPT work today  P: Check in with intimacy worksheets, then discuss final piece of homework  Check in with considering talking to her father again about her childhood, told him "I'd just like him to listen"   Clinton Alexandra in with considering the art of happiness, utilizing "I" statements when talking to [facility]  "I would be a lot less angry", less jealous if she could let go of the judgement of others for having it "easier" than her growing up  Relates disgust with physical affection, or reminded about her father, affection doesn't feel right due to how her father treated her, having to hug him after he picked out a hair from her head  Safety plan in 5/16/22 note  Behavioral Health Treatment Plan [facility]: Diagnosis and Treatment Plan explained to Damaris Pritchard relates understanding diagnosis and is agreeable to Treatment Plan   Yes     Visit start and stop times:    11/21/22  Start Time: 0902  Stop Time: 8427  Total Visit Time: 44 minutes

## 2022-12-02 ENCOUNTER — TELEPHONE (OUTPATIENT)
Dept: PSYCHIATRY | Facility: CLINIC | Age: 22
End: 2022-12-02

## 2022-12-02 NOTE — TELEPHONE ENCOUNTER
Writer attempted to contact pt in regards to a vm we received in which pt informed the cancellation of her appt today at 9:00 a m   [facility]

## 2022-12-09 ENCOUNTER — SOCIAL WORK (OUTPATIENT)
Dept: BEHAVIORAL/MENTAL HEALTH CLINIC | Facility: CLINIC | Age: 22
End: 2022-12-09

## 2022-12-09 DIAGNOSIS — Z72.0 NICOTINE USE: ICD-10-CM

## 2022-12-09 DIAGNOSIS — F43.10 POST TRAUMATIC STRESS DISORDER (PTSD): Primary | ICD-10-CM

## 2022-12-09 NOTE — PSYCH
Psychotherapy Provided: Individual Psychotherapy 45 minutes     Length of time in session: 45 minutes, follow up in 1 week    Encounter Diagnosis     ICD-10-CM    1  Post traumatic stress disorder (PTSD)  F43 10       2  Nicotine use  Z72 0           Goals addressed in session: Goal 1     Pain:      none    0    Current suicide risk : Low     Previous session plan: Check in with intimacy worksheets, then discuss final piece of homework  Check in with considering talking to her father again about her childhood, told him "I'd just like him to listen"  CONTINUED: Check in with considering the art of happiness, utilizing "I" statements when talking to [facility]  "I would be a lot less angry", less jealous if she could let go of the judgement of others for having it "easier" than her growing up  Relates disgust with physical affection, or reminded about her father, affection doesn't feel right due to how her father treated her, having to hug him after he picked out a hair from her head  Safety plan in 5/16/22 note  D: "I actually really like it there" she reports about moving in with her family  She says that she has been acclimating to her new home    This writer provided PCT through empathic listening, validation of feelings, reflection of content and feelings  CPT through psychoeducation  A: Susie Rodriguez appeared to be in a euthymic mood with congruent affect, seemed to be experiencing lower mood at times, mild sleep disturbances, appeared to be kempt, no SI/HI nor GOMEZ risk apparent or reported, seemed to respond well to PCT, CPT work today  P: Discuss her final impact statement  End treatment with Radha Cardozo  Behavioral Health Treatment Plan [facility]: Diagnosis and Treatment Plan explained to Roque Mcgee relates understanding diagnosis and is agreeable to Treatment Plan   Yes     Visit start and stop times:    12/09/22  Start Time: 0900  Stop Time: 0946  Total Visit Time: 46 minutes

## 2022-12-19 ENCOUNTER — TELEPHONE (OUTPATIENT)
Dept: BEHAVIORAL/MENTAL HEALTH CLINIC | Facility: CLINIC | Age: 22
End: 2022-12-19

## 2022-12-19 ENCOUNTER — TELEPHONE (OUTPATIENT)
Dept: PSYCHIATRY | Facility: CLINIC | Age: 22
End: 2022-12-19

## 2022-12-28 ENCOUNTER — SOCIAL WORK (OUTPATIENT)
Dept: BEHAVIORAL/MENTAL HEALTH CLINIC | Facility: CLINIC | Age: 22
End: 2022-12-28

## 2022-12-28 DIAGNOSIS — Z86.59 HISTORY OF POSTTRAUMATIC STRESS DISORDER (PTSD): Primary | ICD-10-CM

## 2022-12-28 NOTE — PSYCH
Psychotherapy Provided: Individual Psychotherapy 45 minutes     Length of time in session: 45 minutes, follow up in 1 week    Encounter Diagnosis     ICD-10-CM    1  History of posttraumatic stress disorder (PTSD)  Z86 59           Goals addressed in session: Goal 1     Pain:      none    0    Current suicide risk : Low     Previous session plan: Discuss her final impact statement  End treatment with Daphney Leon  D: Focused on her final impact statement today "I slept through the whole night last night"  Strong focus on her change from beginning of work with this writer, up to now  Explored her narrative about her life and how she's changed  This writer provided PCT through empathic listening, validation of feelings, summarization work, ending treatment  A: Shannan Tamayo appeared to be in a euthymic mood with congruent affect, seemed to be experiencing no psychiatric symptoms apparen ttoday, appeared to be kempt, no SI/HI nor GOMEZ risk apparent or reported, seemed to respond well to CPT work  P: None  Behavioral Health Treatment Plan [facility]: Diagnosis and Treatment Plan explained to Relda Osler relates understanding diagnosis and is agreeable to Treatment Plan   Yes     Visit start and stop times:    12/28/22  Start Time: 1104  Stop Time: 1155  Total Visit Time: 51 minutes

## 2023-01-10 ENCOUNTER — TELEPHONE (OUTPATIENT)
Dept: BEHAVIORAL/MENTAL HEALTH CLINIC | Facility: CLINIC | Age: 23
End: 2023-01-10

## 2023-01-10 ENCOUNTER — DOCUMENTATION (OUTPATIENT)
Dept: PSYCHIATRY | Facility: CLINIC | Age: 23
End: 2023-01-10

## 2023-01-10 NOTE — PROGRESS NOTES
Assessment/Plan:     1  PTSD (post-traumatic stress disorder)        Subjective:     Patient ID: Kourtney Valerio is a 20 y o  female  Outpatient Discharge Summary:   Admission Date: 12/23/2021  Severiano Overland was referred by Self  Discharge Date: 1/10/2023    Discharge Diagnosis:    1  PTSD (post-traumatic stress disorder)        Treating Physician: n/a  Treatment Complications: NOne  Presenting Problem: PTSD symptoms related to childhood trauma     Course of treatment includes:    individual therapy   Treatment Progress: excellent  Criteria for Discharge: completed treatment goals and objectives and is no longer in need of services  Aftercare recommendations include medication management, therapy as needed  Discharge Medications include:  Current Outpatient Medications:   •  albuterol (PROVENTIL HFA,VENTOLIN HFA) 90 mcg/act inhaler, TAKE 2 PUFFS BY MOUTH EVERY 6 HOURS AS NEEDED FOR WHEEZE OR FOR SHORTNESS OF BREATH (Patient not taking: Reported on 11/11/2022), Disp: 6 7 g, Rfl: 5  •  fluticasone (FLONASE) 50 mcg/act nasal spray, 1 spray into each nostril daily, Disp: 1 Bottle, Rfl: 2  •  hydrOXYzine pamoate (VISTARIL) 25 mg capsule, TAKE 1-2 CAPSULES AT BEDTIME AS NEEDED FOR SLEEP/ANXIETY (Patient not taking: Reported on 11/11/2022), Disp: 180 capsule, Rfl: 1  •  lamoTRIgine (LaMICtal) 25 mg tablet, TAKE 2 TABLETS (50 MG TOTAL) BY MOUTH DAILY AT BEDTIME (Patient not taking: Reported on 11/11/2022), Disp: 180 tablet, Rfl: 1  •  levothyroxine 50 mcg tablet, Take 1 tablet (50 mcg total) by mouth daily, Disp: 90 tablet, Rfl: 1  •  PARAGARD INTRAUTERINE COPPER IU, 1 Device by Intrauterine route continuous, Disp: , Rfl:     Prognosis: good

## 2023-01-16 NOTE — TELEPHONE ENCOUNTER
DISCHARGE LETTER for Laurie Sinks (certified and regular) placed in outgoing mail on 01/16/23      Article #:  [id number]    Address:  [address] [place] [address]

## 2023-01-27 NOTE — TELEPHONE ENCOUNTER
Certificate for the Discharge Letter was signed/received on 1/26//2022  A copy has been scanned into Media

## 2023-02-02 NOTE — BH TREATMENT PLAN
TREATMENT PLAN (Medication Management Only)        [facility]    Name and Date of Birth:  Kourtney Valerio 20 y o  2000  Date of Treatment Plan: September 2, 2022  Diagnosis/Diagnoses:    1  PTSD (post-traumatic stress disorder)    2  Borderline personality disorder ([address] )    3  ADHD (attention deficit hyperactivity disorder), combined type    4  Generalized anxiety disorder      Strengths/Personal Resources for Self-Care: supportive family, ability to communicate needs, ability to understand psychiatric illness, average or above intelligence  Area/Areas of need (in own words): anxiety symptoms, depressive symptoms, mood instability  1  Long Term Goal: continue improvement in mood  Target Date:4 weeks - 9/30/2022  Person/Persons responsible for completion of goal: Doris Gannon  Short Term Objective (s) - How will we reach this goal?:   A  Provider new recommended medication/dosage changes and/or continue medication(s): continue current medications as prescribed  B  N/A   C  N/A  Target Date:4 weeks - 9/30/2022  Person/Persons Responsible for Completion of Goal: Severiano Overland  Progress Towards Goals: initiating treatment  Treatment Modality: medication management every 2 months  Review due 180 days from date of this plan: 6 months - 3/2/2023  Expected length of service: ongoing treatment  My Physician/PA/NP and I have developed this plan together and I agree to work on the goals and objectives  I understand the treatment goals that were developed for my treatment 
Renny Paredes MD

## 2023-03-23 DIAGNOSIS — E03.9 PRIMARY HYPOTHYROIDISM: ICD-10-CM

## 2023-03-23 RX ORDER — LEVOTHYROXINE SODIUM 0.05 MG/1
TABLET ORAL
Qty: 90 TABLET | Refills: 1 | Status: SHIPPED | OUTPATIENT
Start: 2023-03-23

## 2023-03-31 ENCOUNTER — TELEPHONE (OUTPATIENT)
Dept: FAMILY MEDICINE CLINIC | Facility: CLINIC | Age: 23
End: 2023-03-31

## 2023-03-31 NOTE — TELEPHONE ENCOUNTER
Patient called and left this message:     Hi, my name is Roman Lees, birth date [birth year]  I was wondering if you would be willing to send my levothyroxine to a different pharmacy being [facility]  Address is [address] [place] [address] [address], [address]  Thank you  Feel free to give me a call back at [phone]

## 2023-04-03 DIAGNOSIS — E03.9 PRIMARY HYPOTHYROIDISM: ICD-10-CM

## 2023-04-03 RX ORDER — LEVOTHYROXINE SODIUM 0.05 MG/1
50 TABLET ORAL DAILY
Qty: 90 TABLET | Refills: 1 | Status: SHIPPED | OUTPATIENT
Start: 2023-04-03 | End: 2023-04-03 | Stop reason: SDUPTHER

## 2023-04-03 RX ORDER — LEVOTHYROXINE SODIUM 0.05 MG/1
50 TABLET ORAL DAILY
Qty: 90 TABLET | Refills: 1 | Status: SHIPPED | OUTPATIENT
Start: 2023-04-03

## 2023-11-06 DIAGNOSIS — E03.9 PRIMARY HYPOTHYROIDISM: ICD-10-CM

## 2023-11-07 RX ORDER — LEVOTHYROXINE SODIUM 0.05 MG/1
50 TABLET ORAL DAILY
Qty: 90 TABLET | Refills: 1 | Status: SHIPPED | OUTPATIENT
Start: 2023-11-07
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
## 2024-07-02 DIAGNOSIS — E03.9 PRIMARY HYPOTHYROIDISM: ICD-10-CM

## 2024-07-02 RX ORDER — LEVOTHYROXINE SODIUM 0.05 MG/1
50 TABLET ORAL DAILY
Qty: 90 TABLET | Refills: 0 | Status: SHIPPED | OUTPATIENT
Start: 2024-07-02

## 2024-07-02 NOTE — TELEPHONE ENCOUNTER
Reason for call:   [x] Refill   [] Prior Auth  [] Other:     Office:   [x] PCP/Provider -  Barrie Kuhn MD   [] Specialty/Provider -     Medication: levothyroxine 50 mcg tablet     Dose/Frequency: 50 mcg, Oral, Daily     Quantity: 90 tablet     Pharmacy: [facility] - [place], [place] -  [address]    Does the patient have enough for 3 days?   [] Yes   [x] No - Send as HP to POD

## 2024-07-09 DIAGNOSIS — Z00.6 ENCOUNTER FOR EXAMINATION FOR NORMAL COMPARISON OR CONTROL IN CLINICAL RESEARCH PROGRAM: ICD-10-CM

## 2024-11-05 ENCOUNTER — TELEPHONE (OUTPATIENT)
Age: 24
End: 2024-11-05

## 2024-11-05 NOTE — TELEPHONE ENCOUNTER
Wait list - MM and TT    Doris called to request TT with her former Therapist Jeremi Morris.  Doris is on medication and needs MM    Doris is moving back to [place] in a month.

## 2025-01-08 ENCOUNTER — TELEPHONE (OUTPATIENT)
Age: 25
End: 2025-01-08

## 2025-01-08 NOTE — TELEPHONE ENCOUNTER
Patient called in regard to wait list status. Writer confirmed patient is on TT and MM wait list and will be contacted upon availability.

## 2025-01-24 ENCOUNTER — OFFICE VISIT (OUTPATIENT)
Dept: URGENT CARE | Facility: CLINIC | Age: 25
End: 2025-01-24
Payer: COMMERCIAL

## 2025-01-24 VITALS
DIASTOLIC BLOOD PRESSURE: 66 MMHG | HEART RATE: 103 BPM | SYSTOLIC BLOOD PRESSURE: 118 MMHG | OXYGEN SATURATION: 98 % | RESPIRATION RATE: 16 BRPM | TEMPERATURE: 99 F

## 2025-01-24 DIAGNOSIS — J02.9 ACUTE PHARYNGITIS, UNSPECIFIED ETIOLOGY: ICD-10-CM

## 2025-01-24 DIAGNOSIS — H66.001 NON-RECURRENT ACUTE SUPPURATIVE OTITIS MEDIA OF RIGHT EAR WITHOUT SPONTANEOUS RUPTURE OF TYMPANIC MEMBRANE: Primary | ICD-10-CM

## 2025-01-24 PROCEDURE — G0382 LEV 3 HOSP TYPE B ED VISIT: HCPCS

## 2025-01-24 RX ORDER — AMOXICILLIN 875 MG/1
875 TABLET, COATED ORAL 2 TIMES DAILY
Qty: 14 TABLET | Refills: 0 | Status: SHIPPED | OUTPATIENT
Start: 2025-01-24 | End: 2025-01-31

## 2025-01-24 RX ORDER — PREDNISONE 10 MG/1
TABLET ORAL
Qty: 27 TABLET | Refills: 0 | Status: SHIPPED | OUTPATIENT
Start: 2025-01-24

## 2025-01-24 NOTE — PATIENT INSTRUCTIONS
Start antibiotics and steroids that was ordered for your ear infection and also acute pharyngitis  Change your toothbrush 24 hours after starting antibiotics as bacteria can be reintroduced by what grows on your toothbrush.  For sore throat:  Salt water gurgle  Chloraseptic throat spray  Teaspoon of Honey up to 3x a day.  Throat lozenges  OTC pain medication such as Tylenol or Ibuprofen    If you find that the steroids is making you too jittery, you can separate the amount you take each time such as 3 and 3 for total of 6 pills in a day.    Follow up with Primary Care Provider in 3-5 days if not improving.  Proceed to Emergency Department if symptoms worsen.    If tests have been performed at [facility], our office will contact you with results if changes need to be made to the care plan discussed with you at the visit.  You can review your full results on [facility] MyChart.

## 2025-01-24 NOTE — PROGRESS NOTES
[facility]        NAME: Doris Burrell is a 24 y.o. female  : 2000    MRN: [id number]  DATE: 2025  TIME: 2:37 PM    Assessment and Plan   Non-recurrent acute suppurative otitis media of right ear without spontaneous rupture of tympanic membrane [H66.001]  1. Non-recurrent acute suppurative otitis media of right ear without spontaneous rupture of tympanic membrane  amoxicillin (AMOXIL) 875 mg tablet      2. Acute pharyngitis, unspecified etiology  amoxicillin (AMOXIL) 875 mg tablet    predniSONE 10 mg tablet            Patient Instructions   Start antibiotics and steroids that was ordered for your ear infection and also acute pharyngitis  Change your toothbrush 24 hours after starting antibiotics as bacteria can be reintroduced by what grows on your toothbrush.  For sore throat:  Salt water gurgle  Chloraseptic throat spray  Teaspoon of Honey up to 3x a day.  Throat lozenges  OTC pain medication such as Tylenol or Ibuprofen    If you find that the steroids is making you too jittery, you can separate the amount you take each time such as 3 and 3 for total of 6 pills in a day.    Follow up with Primary Care Provider in 3-5 days if not improving.  Proceed to Emergency Department if symptoms worsen.    If tests have been performed at [facility], our office will contact you with results if changes need to be made to the care plan discussed with you at the visit.  [facility]    Chief Complaint     Chief Complaint   Patient presents with   • Flu like symptoms     For the past 4 days the patient reports having sore throat/ear pain/fever and chills with body aches and fatigue- She also reports having a headache and post nasal drip.          History of Present Illness       Sore throat, fevers, chills starting about 4 days ago.  Right ear pain starting in the last 2 days.  Also reporting headache and postnasal drip and has been having increased body aches  and fatigue.  She is going on a work trip in 2 days and is also wondering if she is able to work.        Review of Systems   Review of Systems   Constitutional:  Positive for chills and fever.   HENT:  Positive for sore throat, trouble swallowing and voice change. Negative for congestion and ear pain.    Eyes:  Negative for pain and visual disturbance.   Respiratory:  Negative for cough and shortness of breath.    Cardiovascular:  Negative for chest pain and palpitations.   Gastrointestinal:  Negative for abdominal pain and vomiting.   Genitourinary:  Negative for dysuria and hematuria.   Musculoskeletal:  Positive for myalgias. Negative for arthralgias and back pain.   Skin:  Negative for color change and rash.   Neurological:  Positive for headaches. Negative for seizures and syncope.   All other systems reviewed and are negative.        Current Medications       Current Outpatient Medications:   •  amoxicillin (AMOXIL) 875 mg tablet, Take 1 tablet (875 mg total) by mouth 2 (two) times a day for 7 days, Disp: 14 tablet, Rfl: 0  •  predniSONE 10 mg tablet, Taken as a tapering dose Daily- 6, 6, 5, 4, 3, 2, 1, Disp: 27 tablet, Rfl: 0  •  albuterol (PROVENTIL HFA,VENTOLIN HFA) 90 mcg/act inhaler, TAKE 2 PUFFS BY MOUTH EVERY 6 HOURS AS NEEDED FOR WHEEZE OR FOR SHORTNESS OF BREATH (Patient not taking: Reported on 11/11/2022), Disp: 6.7 g, Rfl: 5  •  fluticasone (FLONASE) 50 mcg/act nasal spray, 1 spray into each nostril daily, Disp: 1 Bottle, Rfl: 2  •  hydrOXYzine pamoate (VISTARIL) 25 mg capsule, TAKE 1-2 CAPSULES AT BEDTIME AS NEEDED FOR SLEEP/ANXIETY (Patient not taking: Reported on 11/11/2022), Disp: 180 capsule, Rfl: 1  •  lamoTRIgine (LaMICtal) 25 mg tablet, TAKE 2 TABLETS (50 MG TOTAL) BY MOUTH DAILY AT BEDTIME (Patient not taking: Reported on 11/11/2022), Disp: 180 tablet, Rfl: 1  •  levothyroxine 50 mcg tablet, Take 1 tablet (50 mcg total) by mouth daily, Disp: 90 tablet, Rfl: 0  •  PARAGARD INTRAUTERINE  COPPER IU, 1 Device by Intrauterine route continuous, Disp: , Rfl:     Current Allergies     Allergies as of 01/24/2025   • (No Known Allergies)            The following portions of the patient's history were reviewed and updated as appropriate: allergies, current medications, past family history, past medical history, past social history, past surgical history and problem list.     Past Medical History:   Diagnosis Date   • Acne    • ADHD (attention deficit hyperactivity disorder)    • Borderline personality disorder (HCC)    • Depression    • Fracture of carpal bones    • Hypothyroidism    • Hypothyroidism (acquired)    • PTSD (post-traumatic stress disorder)    • Suicide and self-inflicted injury (HCC) 11/2018   • Suicide attempt ([facility])        Past Surgical History:   Procedure Laterality Date   • WISDOM TOOTH EXTRACTION         Family History   Problem Relation Age of Onset   • ADD / ADHD Mother    • Bipolar disorder Mother    • Stroke Father    • Hypertension Father    • ADD / ADHD Father    • Diabetes Father    • ADD / ADHD Sister    • ADD / ADHD Brother    • ADD / ADHD Brother    • Uterine cancer Maternal Grandmother    • Cervical cancer Maternal Grandmother    • Endometrial cancer Maternal Grandmother    • Breast cancer Paternal Aunt    • Bipolar disorder Paternal Uncle    • Skin cancer Paternal Uncle    • Hypertension Family    • Thyroid disease Family    • Cancer Family    • Heart disease Family    • Diabetes Family          Medications have been verified.        Objective   /66   Pulse 103   Temp 99 °F (37.2 °C)   Resp 16   SpO2 98%   No LMP recorded.       Physical Exam     Physical Exam  Vitals and nursing note reviewed.   HENT:      Head: Atraumatic.      Right Ear: Tympanic membrane is erythematous and bulging.      Left Ear: Tympanic membrane normal.      Nose: Rhinorrhea present. No congestion.      Right Sinus: No maxillary sinus tenderness or frontal sinus tenderness.      Left Sinus: No  maxillary sinus tenderness or frontal sinus tenderness.      Mouth/Throat:      Pharynx: Uvula midline. Pharyngeal swelling and posterior oropharyngeal erythema present. No oropharyngeal exudate.      Tonsils: No tonsillar exudate. 2+ on the right. 2+ on the left.   Eyes:      Conjunctiva/sclera: Conjunctivae normal.      Pupils: Pupils are equal, round, and reactive to light.   Cardiovascular:      Rate and Rhythm: Normal rate.      Pulses: Normal pulses.      Heart sounds: Normal heart sounds.   Pulmonary:      Effort: Pulmonary effort is normal.      Breath sounds: Normal breath sounds.   Abdominal:      Palpations: Abdomen is soft.   Musculoskeletal:      Cervical back: Normal range of motion and neck supple.   Lymphadenopathy:      Cervical: No cervical adenopathy.   Skin:     General: Skin is warm and dry.      Capillary Refill: Capillary refill takes less than 2 seconds.   Neurological:      General: No focal deficit present.      Mental Status: She is alert and oriented to person, place, and time. Mental status is at baseline.      Motor: No weakness.

## 2025-02-06 ENCOUNTER — TELEPHONE (OUTPATIENT)
Age: 25
End: 2025-02-06

## 2025-02-06 NOTE — TELEPHONE ENCOUNTER
"Behavioral Health Outpatient Intake Questions    Referred By   : SELF    Please advise interviewee that they need to answer all questions truthfully to allow for best care, and any misrepresentations of information may affect their ability to be seen at this clinic   => Was this discussed? Yes     If Minor Child (under age 18)    Who is/are the legal guardian(s) of the child?     Is there a custody agreement?      If \"YES\"- Custody orders must be obtained prior to scheduling the first appointment  In addition, Consent to Treatment must be signed by all legal guardians prior to scheduling the first appointment    If \"NO\"- Consent to Treatment must be signed by all legal guardians prior to scheduling the first appointment    Behavioral Health Outpatient Intake History -     Presenting Problem (in patient's own words): ADHD    Are there any communication barriers for this patient?     Yes                                               If yes, please describe barriers: ADHD  If there is a unique situation, please refer to Earle Tadeo/Stephania Raphael for final determination.    Are you taking any psychiatric medications? Yes     If \"YES\" -What are they Concerta     If \"YES\" -Who prescribes? CurrentPsychiatrist     Has the Patient previously received outpatient Talk Therapy or Medication Management from [facility]  Yes        If \"YES\"- When, Where and with Whom?     [facility] 2022     If \"NO\" -Has Patient received these services elsewhere?       If \"YES\" -When, Where, and with Whom?  [facility]    Has the Patient abused alcohol or other substances in the last 6 months ? No  No concerns of substance abuse are reported.     If \"YES\" -What substance, How much, How often?       If illegal substance: Refer to [facility] (for [facility]) or [facility].   If Alcohol in excess of 10 drinks per week:  Refer to [facility] (for [facility]) or [facility]    Legal History-     Is this treatment court ordered? No " "  If \"yes \"send to :  Talk Therapy : Send to Earle Tadeo for final determination   Med Management: Send to Dr. Alonso for final determination     Has the Patient been convicted of a felony?  No   If \"Yes\" send to -When, What?  Talk Therapy: Send to Earle Tadeo for final determination   Med Management: Send to Dr. Alonso for final determination     ACCEPTED as a patient Yes  If \"Yes\" Appointment Date:   Dr. Mills 3/27 @  ()    Referred Elsewhere? No  If “Yes” - (Where? Ex: [facility], [facility], [facility], [facility], etc.)       Name of Insurance Co:[employer] [facility]  Insurance ID#[id number]   Insurance Phone #  If ins is primary or secondary?  If patient is a minor, parents information such as Name, D.O.B of guarantor.  "

## 2025-02-13 ENCOUNTER — TELEPHONE (OUTPATIENT)
Dept: PSYCHIATRY | Facility: CLINIC | Age: 25
End: 2025-02-13

## 2025-02-13 NOTE — TELEPHONE ENCOUNTER
One week follow up call for New Patient appointment with Fabio Fan on 3/27/25  was made on 2/13/25. Writer informed patient of New Patient paperwork needing to be completed 5 days prior to the appointment. Writer confirmed paperwork has been sent via [employer].    Appointment was made on: 2/6/25

## 2025-03-27 ENCOUNTER — TELEPHONE (OUTPATIENT)
Age: 25
End: 2025-03-27

## 2025-03-27 NOTE — TELEPHONE ENCOUNTER
Patient is calling regarding cancelling an appointment.    Date/Time: 3/27/25 at 3 pm    Reason: Pt has another commitment    Patient was rescheduled: YES [x] NO []  If yes, when was Patient reschedule for: NP appt 4/8/25 at 2 pm.    Writer cancelled f/u appt but did not reschedule the follow up. Writer left VM for pt to call back to reschedule 4 wk f/u appt.

## 2025-04-05 ENCOUNTER — OFFICE VISIT (OUTPATIENT)
Dept: URGENT CARE | Facility: CLINIC | Age: 25
End: 2025-04-05
Payer: COMMERCIAL

## 2025-04-05 VITALS — HEART RATE: 67 BPM | RESPIRATION RATE: 16 BRPM | OXYGEN SATURATION: 98 % | TEMPERATURE: 97.5 F

## 2025-04-05 DIAGNOSIS — J34.0 CELLULITIS OF NASAL TIP: Primary | ICD-10-CM

## 2025-04-05 PROCEDURE — 87070 CULTURE OTHR SPECIMN AEROBIC: CPT | Performed by: NURSE PRACTITIONER

## 2025-04-05 PROCEDURE — 87077 CULTURE AEROBIC IDENTIFY: CPT | Performed by: NURSE PRACTITIONER

## 2025-04-05 PROCEDURE — G0382 LEV 3 HOSP TYPE B ED VISIT: HCPCS | Performed by: NURSE PRACTITIONER

## 2025-04-05 PROCEDURE — 87205 SMEAR GRAM STAIN: CPT | Performed by: NURSE PRACTITIONER

## 2025-04-05 RX ORDER — SULFAMETHOXAZOLE AND TRIMETHOPRIM 800; 160 MG/1; MG/1
1 TABLET ORAL 2 TIMES DAILY
COMMUNITY
Start: 2025-04-02 | End: 2025-04-09

## 2025-04-05 RX ORDER — METHYLPHENIDATE HYDROCHLORIDE 27 MG/1
27 TABLET, EXTENDED RELEASE ORAL DAILY
COMMUNITY
Start: 2025-03-07 | End: 2025-04-08 | Stop reason: SDUPTHER

## 2025-04-05 RX ORDER — MUPIROCIN 20 MG/G
OINTMENT TOPICAL
COMMUNITY
Start: 2025-04-02

## 2025-04-05 NOTE — PROGRESS NOTES
"  [facility]        NAME: Doris Burrell is a 25 y.o. female  : 2000    MRN: [id number]  DATE: 2025  TIME: 12:44 PM    Assessment and Plan   Cellulitis of nasal tip [J34.0]  1. Cellulitis of nasal tip  amoxicillin-clavulanate (AUGMENTIN) 875-125 mg per tablet    Wound culture and Gram stain    Ambulatory Referral to Otolaryngology            Patient Instructions     Patient Instructions   --Take additional antibiotic as prescribed. Take with food  --Warm compresses three times a day  --Rx topical antibiotic to inside of nares as prescribed  previously  --Will send wound culture, calling if results positive (anticipate 48-72 hours)  --Follow-up with ENT if no improvement  --Go to ER For worsening pain, swelling, redness, fever     If tests have been performed at [facility], our office will contact you with results if changes need to be made to the care plan discussed with you at the visit.  You can review your full results on [facility] MyChart.    Chief Complaint     Chief Complaint   Patient presents with    nasal infection     Pt was diagnosed with cellulitis of her nose and was given abtx however she reports that the pain is getting worse and her nose feels warm to touch she has been taking current medication for 3 days.          History of Present Illness       Here with complaints of nasal infection.    Tip/inside of nose starting hurting two weeks ago.  Became more bothersome in the past 4-5 days. Associated faint redness, swelling, small amount of brownish drainage at times.  Small amount of blood noted. No fever.   Had virtual visit with PCP .  Prescribed oral antibiotics (Bactrim, Augmentin) as well as topical antibiotic (Bacroban).    Taking Bactrim and applying ointment as prescribed. Never picked up other antibiotic (Augmentin), however, because when she went to pharmacy, it was \"cancelled\".    Nose tenderness no better, worse if anything. Feels like current antibiotic is not " working. Applying warm compresses also.    No fever.   Denies prior nose, other soft tissue infections.  Decent recent piercings, trauma.   Denies hx MRSA.    Denies pregnancy.          Review of Systems   Review of Systems   Constitutional:  Negative for fever.   HENT:  Positive for rhinorrhea.          Current Medications       Current Outpatient Medications:     amoxicillin-clavulanate (AUGMENTIN) 875-125 mg per tablet, Take 1 tablet by mouth every 12 (twelve) hours for 7 days, Disp: 14 tablet, Rfl: 0    levothyroxine 50 mcg tablet, Take 1 tablet (50 mcg total) by mouth daily, Disp: 90 tablet, Rfl: 0    Methylphenidate HCl ER 27 MG TB24, Take 27 mg by mouth daily, Disp: , Rfl:     mupirocin (BACTROBAN) 2 % ointment, apply to affected area twice a day for 7 days, Disp: , Rfl:     PARAGARD INTRAUTERINE COPPER IU, 1 Device by Intrauterine route continuous, Disp: , Rfl:     sulfamethoxazole-trimethoprim (BACTRIM DS) 800-160 mg per tablet, Take 1 tablet by mouth 2 (two) times a day, Disp: , Rfl:     albuterol (PROVENTIL HFA,VENTOLIN HFA) 90 mcg/act inhaler, TAKE 2 PUFFS BY MOUTH EVERY 6 HOURS AS NEEDED FOR WHEEZE OR FOR SHORTNESS OF BREATH (Patient not taking: Reported on 11/11/2022), Disp: 6.7 g, Rfl: 5    fluticasone (FLONASE) 50 mcg/act nasal spray, 1 spray into each nostril daily, Disp: 1 Bottle, Rfl: 2    hydrOXYzine pamoate (VISTARIL) 25 mg capsule, TAKE 1-2 CAPSULES AT BEDTIME AS NEEDED FOR SLEEP/ANXIETY (Patient not taking: Reported on 11/11/2022), Disp: 180 capsule, Rfl: 1    lamoTRIgine (LaMICtal) 25 mg tablet, TAKE 2 TABLETS (50 MG TOTAL) BY MOUTH DAILY AT BEDTIME (Patient not taking: Reported on 11/11/2022), Disp: 180 tablet, Rfl: 1    predniSONE 10 mg tablet, Taken as a tapering dose Daily- 6, 6, 5, 4, 3, 2, 1, Disp: 27 tablet, Rfl: 0    Current Allergies     Allergies as of 04/05/2025    (No Known Allergies)            The following portions of the patient's history were reviewed and updated as  appropriate: allergies, current medications, past family history, past medical history, past social history, past surgical history and problem list.     Past Medical History:   Diagnosis Date    Acne     ADHD (attention deficit hyperactivity disorder)     Borderline personality disorder (HCC)     Depression     Fracture of carpal bones     Hypothyroidism     Hypothyroidism (acquired)     PTSD (post-traumatic stress disorder)     Suicide and self-inflicted injury (HCC) 11/2018    Suicide attempt (HCC)        Past Surgical History:   Procedure Laterality Date    WISDOM TOOTH EXTRACTION         Family History   Problem Relation Age of Onset    ADD / ADHD Mother     Bipolar disorder Mother     Stroke Father     Hypertension Father     ADD / ADHD Father     Diabetes Father     ADD / ADHD Sister     ADD / ADHD Brother     ADD / ADHD Brother     Uterine cancer Maternal Grandmother     Cervical cancer Maternal Grandmother     Endometrial cancer Maternal Grandmother     Breast cancer Paternal Aunt     Bipolar disorder Paternal Uncle     Skin cancer Paternal Uncle     Hypertension Family     Thyroid disease Family     Cancer Family     Heart disease Family     Diabetes Family          Medications have been verified.        Objective   Pulse 67   Temp 97.5 °F (36.4 °C)   Resp 16   SpO2 98%   No LMP recorded.       Physical Exam     Physical Exam  HENT:      Nose: No rhinorrhea.      Comments: Tip/apex of nose with faint erythema, mild swelling, tenderness extending to left ala nasi.    Left turbinate erythematous, tender along septum and inner aspect of ala nasi.   No drainage/otorrhea/bleeding noted.  No obvious ingrown hair or furuncle.    No tenderness, erythema, swelling of nasal bone or perinasal/perioral soft tissue including maxillary sinuses.   Pulmonary:      Effort: Pulmonary effort is normal.   Neurological:      Mental Status: She is alert.

## 2025-04-05 NOTE — PATIENT INSTRUCTIONS
--Take additional antibiotic as prescribed. Take with food  --Warm compresses three times a day  --Rx topical antibiotic to inside of nares as prescribed  previously  --Will send wound culture, calling if results positive (anticipate 48-72 hours)  --Follow-up with ENT if no improvement  --Go to ER For worsening pain, swelling, redness, fever

## 2025-04-06 LAB — GRAM STN SPEC: NORMAL

## 2025-04-08 ENCOUNTER — TELEMEDICINE (OUTPATIENT)
Dept: PSYCHIATRY | Facility: CLINIC | Age: 25
End: 2025-04-08
Payer: COMMERCIAL

## 2025-04-08 DIAGNOSIS — F43.10 PTSD (POST-TRAUMATIC STRESS DISORDER): ICD-10-CM

## 2025-04-08 DIAGNOSIS — F33.42 MAJOR DEPRESSIVE DISORDER, RECURRENT, IN FULL REMISSION (HCC): ICD-10-CM

## 2025-04-08 DIAGNOSIS — F90.2 ATTENTION DEFICIT HYPERACTIVITY DISORDER, COMBINED TYPE: Primary | ICD-10-CM

## 2025-04-08 DIAGNOSIS — F41.1 GENERALIZED ANXIETY DISORDER: ICD-10-CM

## 2025-04-08 PROBLEM — F31.81 BIPOLAR 2 DISORDER (HCC): Status: RESOLVED | Noted: 2019-11-16 | Resolved: 2025-04-08

## 2025-04-08 PROCEDURE — 90792 PSYCH DIAG EVAL W/MED SRVCS: CPT | Performed by: STUDENT IN AN ORGANIZED HEALTH CARE EDUCATION/TRAINING PROGRAM

## 2025-04-08 RX ORDER — METHYLPHENIDATE HYDROCHLORIDE 27 MG/1
27 TABLET, EXTENDED RELEASE ORAL DAILY
Qty: 30 TABLET | Refills: 0 | Status: SHIPPED | OUTPATIENT
Start: 2025-04-08

## 2025-04-08 NOTE — ASSESSMENT & PLAN NOTE
The patient has a history of generalized anxiety disorder.  Her symptoms do appear to be consistent with this.  She has had excessive worry, tenseness, difficulty controlling worrying, restless energy, prior history of skin picking behaviors and trichotillomania.  She has had overall improvement in her symptoms with therapy.  Symptoms currently appear to be fairly well-controlled.  She would like to reestablish with her prior therapist.    Patient will be contacting her prior therapist to reestablish care.

## 2025-04-08 NOTE — ASSESSMENT & PLAN NOTE
The patient has a history of PTSD.  She has had prolonged and frequent traumas throughout her life and has struggled with previous nightmares, flashbacks, hypervigilance, and avoidance.  She has done cognitive processing therapy and has done quite well with this in the past.  Symptoms are currently fairly well-controlled.  As above, patient plans to reestablish with her prior therapist.    See plan for generalized anxiety disorder.

## 2025-04-08 NOTE — BH TREATMENT PLAN
TREATMENT PLAN (Medication Management Only)        [facility]    Name and Date of Birth:  Doris Burrell 25 y.o. 2000  MRN: [id number]  Date of Treatment Plan: April 8, 2025  Diagnosis/Diagnoses:    1. Attention deficit hyperactivity disorder, combined type    2. PTSD (post-traumatic stress disorder)    3. Generalized anxiety disorder    4. Major depressive disorder, recurrent, in full remission (HCC)      Strengths/Personal Resources for Self-Care: supportive family, supportive friends, taking medications as prescribed, ability to adapt to life changes, ability to communicate well, ability to listen, ability to reason, ability to understand psychiatric illness, average or above intelligence, general fund of knowledge, good physical health, independence, motivation for treatment, being resoureceful, self-reliance, stable employment, well educated, willingness to work on problems.  Area/Areas of need (in own words): anxiety symptoms, depressive symptoms, ADHD symptoms  1. Long Term Goal:   continue improvement in ADHD symptoms.  Target Date:6 months - 10/8/2025  Person/Persons responsible for completion of goal: Doris  2.  Short Term Objective (s) - How will we reach this goal?:   A.  Provider new recommended medication/dosage changes and/or continue medication(s): continue current medications as prescribed (Concerta 27 mg daily).  B.  Will consider initiation of maintenance therapy .  C.  Will work on lifestyle modification as needed to address stressors and improve structure and organization .  Target Date:6 months - 10/8/2025  Person/Persons Responsible for Completion of Goal: Doris  Progress Towards Goals: starting treatment  Treatment Modality: medication management every 4 weeks  Review due 180 days from date of this plan: 6 months - 10/8/2025  Expected length of service: ongoing treatment unless revised  My Physician/PA/NP and I have developed this plan  together and I agree to work on the goals and objectives. I understand the treatment goals that were developed for my treatment.   Electronic Signatures: on file (unless signed below)    Fabio Mills MD 04/08/25

## 2025-04-08 NOTE — ASSESSMENT & PLAN NOTE
Steri-strips being applied to right neck puncture site and right chest incision site by YUKO Cardozo.   The patient has a history that does appear to be consistent with major depressive disorder.  She is previously had prolonged episodes of low mood, decreased interest, low energy, feelings of guilt, and suicidal thoughts.  Her symptoms appear to be very well-controlled and have improved dramatically with therapy.  She currently denies any depressive symptoms and does appear to be in remission.  However, she does want to reestablish with her therapist for maintenance.    See plan for generalized anxiety disorder.
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
## 2025-04-08 NOTE — ASSESSMENT & PLAN NOTE
The patient has a history of ADHD diagnosed during adolescence.  She has historically struggled with problems with sustained attention, hyperactivity, organization, task initiation and management, forgetfulness, impulsivity.  She has been in treatment for this for many years, though had been off of all medications for a few years with recent restarting in September 2024.  The patient had previously been managed with Adderall, though found that this made her more irritable and when we started on medication management was started on Concerta, with notable improvement in her tolerability.  She has been doing quite well on her current regimen of Concerta 27 mg daily and symptoms currently appear to be stable.    Continue Concerta 27 mg daily.

## 2025-04-09 ENCOUNTER — RESULTS FOLLOW-UP (OUTPATIENT)
Dept: URGENT CARE | Facility: CLINIC | Age: 25
End: 2025-04-09

## 2025-04-09 LAB
BACTERIA WND AEROBE CULT: ABNORMAL
GRAM STN SPEC: ABNORMAL

## 2025-05-14 NOTE — ASSESSMENT & PLAN NOTE
States she has had a friable cervix and spotting after sex for 2 years  Over the last month the postcoital bleeding has worsened  Will check cg/ct, urine dip, tsh and cbc  Denies SOB, fatigue or lightheadedness  Advised to schedule earlier appt with obgyn  U/A was negative  Other Diet Instructions

## 2025-05-20 ENCOUNTER — TELEPHONE (OUTPATIENT)
Age: 25
End: 2025-05-20

## 2025-05-20 DIAGNOSIS — F90.2 ATTENTION DEFICIT HYPERACTIVITY DISORDER, COMBINED TYPE: ICD-10-CM

## 2025-05-20 RX ORDER — METHYLPHENIDATE HYDROCHLORIDE 27 MG/1
27 TABLET, EXTENDED RELEASE ORAL DAILY
Qty: 30 TABLET | Refills: 0 | Status: CANCELLED | OUTPATIENT
Start: 2025-05-20

## 2025-05-20 NOTE — TELEPHONE ENCOUNTER
04/08/2025 04/08/2025 Methylphenidate Hcl (Tablet, Extended Release) 30.0 30 27 MG NA DANIKA LIMA [facility] Commercial [place] 0 / 0 [place]   1 [id number] ** 03/07/2025 03/07/2025 Methylphenidate Hcl (Tablet, Extended Release) 30.0 30 27 MG NA BLANCO DAVIES [facility] [place] / 0 [place]   1 [id number] ** 01/16/2025 01/16/2025 Methylphenidate Hcl (Tablet, Extended Release) 30.0

## 2025-05-20 NOTE — TELEPHONE ENCOUNTER
Medication Refill Request     Name of Medication Methyphenidate  Dose/Frequency 27mg  Quantity 30mg  Verified pharmacy   [x]  Verified ordering Provider   [x]  Does patient have enough for the next 3 days? Yes [x] No []  Does patient have a follow-up appointment scheduled? Yes [x] No []   If so when is appointment: 5/27 at 11am

## 2025-05-27 ENCOUNTER — TELEMEDICINE (OUTPATIENT)
Dept: PSYCHIATRY | Facility: CLINIC | Age: 25
End: 2025-05-27
Payer: COMMERCIAL

## 2025-05-27 DIAGNOSIS — F90.2 ATTENTION DEFICIT HYPERACTIVITY DISORDER, COMBINED TYPE: Primary | ICD-10-CM

## 2025-05-27 DIAGNOSIS — F41.1 GENERALIZED ANXIETY DISORDER: ICD-10-CM

## 2025-05-27 DIAGNOSIS — F33.42 MAJOR DEPRESSIVE DISORDER, RECURRENT, IN FULL REMISSION (HCC): ICD-10-CM

## 2025-05-27 DIAGNOSIS — F43.10 PTSD (POST-TRAUMATIC STRESS DISORDER): ICD-10-CM

## 2025-05-27 PROCEDURE — 98005 SYNCH AUDIO-VIDEO EST LOW 20: CPT | Performed by: STUDENT IN AN ORGANIZED HEALTH CARE EDUCATION/TRAINING PROGRAM

## 2025-05-27 RX ORDER — METHYLPHENIDATE HYDROCHLORIDE 27 MG/1
27 TABLET, EXTENDED RELEASE ORAL DAILY
Qty: 30 TABLET | Refills: 0 | Status: SHIPPED | OUTPATIENT
Start: 2025-05-27

## 2025-05-27 NOTE — ASSESSMENT & PLAN NOTE
The patient has a history that does appear to be consistent with major depressive disorder.  She is previously had prolonged episodes of low mood, decreased interest, low energy, feelings of guilt, and suicidal thoughts.  Her symptoms appear to be very well-controlled and have improved dramatically with therapy.  She currently denies any depressive symptoms and does appear to be in remission and stable.    See plan for generalized anxiety disorder.

## 2025-05-27 NOTE — PSYCH
MEDICATION MANAGEMENT NOTE    Name: Doris Burrell      : 2000      MRN: [id number]  Encounter Provider: Fabio Mills MD  Encounter Date: 2025   Encounter department: [facility]    Insurance: Payor: [employer] / Plan: [employer] PPO / Product Type: PPO /      Reason for Visit:   Chief Complaint   Patient presents with    Follow-up    Medication Management   :  Assessment & Plan  Attention deficit hyperactivity disorder, combined type  The patient has a history of ADHD diagnosed during adolescence.  She has historically struggled with problems with sustained attention, hyperactivity, organization, task initiation and management, forgetfulness, impulsivity.  She has been in treatment for this for many years, though had been off of all medications for a few years with recent restarting in 2024.  The patient had previously been managed with Adderall, though found that this made her more irritable and when we started on medication management was started on Concerta, with notable improvement in her tolerability.  She has been doing quite well on her current regimen of Concerta 27 mg daily and symptoms currently appear to be stable.    Continue Concerta 27 mg daily.    Orders:    Methylphenidate HCl ER 27 MG TB24; Take 1 tablet (27 mg total) by mouth daily Max Daily Amount: 27 mg    PTSD (post-traumatic stress disorder)  The patient has a history of PTSD.  She has had prolonged and frequent traumas throughout her life and has struggled with previous nightmares, flashbacks, hypervigilance, and avoidance.  She has done cognitive processing therapy and has done quite well with this in the past.  Symptoms are currently fairly well-controlled.  As above, patient has re-established with prior therapist.    See plan for generalized anxiety disorder.         Generalized anxiety disorder  The patient has a history of generalized anxiety disorder.  Her symptoms do appear to  be consistent with this.  She has had excessive worry, tenseness, difficulty controlling worrying, restless energy, prior history of skin picking behaviors and trichotillomania.  She has had overall improvement in her symptoms with therapy.  Symptoms currently appear to be fairly well-controlled though with residual symptoms of anxiety and panic at times.  She has re-established with prior therapist.    Continue individual psychotherapy.         Major depressive disorder, recurrent, in full remission (HCC)  The patient has a history that does appear to be consistent with major depressive disorder.  She is previously had prolonged episodes of low mood, decreased interest, low energy, feelings of guilt, and suicidal thoughts.  Her symptoms appear to be very well-controlled and have improved dramatically with therapy.  She currently denies any depressive symptoms and does appear to be in remission and stable.    See plan for generalized anxiety disorder.             Treatment Recommendations:    Educated about diagnosis and treatment modalities. Verbalizes understanding and agreement with the treatment plan.  Discussed self monitoring of symptoms, and symptom monitoring tools.  Discussed medications and if treatment adjustment was needed or desired.  Medication management every 4 weeks  Aware of 24 hour and weekend coverage for urgent situations accessed by calling [facility] main practice number  I am scheduling this patient out for greater than 3 months: No    Medications Risks/Benefits:      Risks, Benefits And Possible Side Effects Of Medications:    Risks, benefits, and possible side effects of medications explained to Doris and she (or legal representative) verbalizes understanding and agreement for treatment.    Controlled Medication Discussion:     Doris has been filling controlled prescriptions on time as prescribed according to [place] Prescription Drug Monitoring  Program.      History of Present Illness     This patient presents for medication management for ADHD, generalized anxiety disorder, major depressive disorder, and PTSD.  At initial visit, patient was continued on Concerta at 27 mg daily.  She had also planned to reestablish with her prior therapist.  Patient states that she has reestablished with prior therapist and has been initially to biweekly sessions, though is now attending weekly sessions.  She reports that she has been doing overall quite well, though recently had been struggling with the break-up after finding out that her partner had been cheating on her.  She states that she has been attempting to maintain good boundaries, though that he does continue to contact her at times, which she finds frustrating.  The patient reports that she was noticing some sleep interruption due to bad dreams, though states that this has improved after he started to see her therapist again.  She has, her mood has remained good.  She has been engaging in hobbies more and spends more time walking outside and exercising.  She states that her appetite has been good, with some reduction from Concerta, though she remains cognizant of this and insures that she eats sufficiently.  The patient does note some periods of increased anxiety and panic, though at this time feels that she has fair control over this and wants to continue working with her therapist without making medication changes to address this quite yet.  She denies any SI, HI, or AVH.  She denies any problems tolerating her medications.  She denies any questions or concerns at this time.    Review Of Systems: A review of systems is obtained and is negative except for the pertinent positives listed in HPI/Subjective above.      Current Rating Scores:     Current PHQ-9   PHQ-2/9 Depression Screening           Current MARCI-7     Areas of Improvement: reviewed in HPI/Subjective Section and reviewed in Assessment and Plan  Section      Past Medical History[1]  Past Surgical History[2]  Allergies: Allergies[3]    Current Outpatient Medications   Medication Instructions    levothyroxine 50 mcg, Oral, Daily    Methylphenidate HCl ER 27 mg, Oral, Daily    mupirocin (BACTROBAN) 2 % ointment apply to affected area twice a day for 7 days    PARAGARD INTRAUTERINE COPPER IU 1 Device, Continuous        Substance Abuse History:    Tobacco, Alcohol and Drug Use History     Tobacco Use    Smoking status: Former     Current packs/day: 0.00     Types: Cigarettes     Quit date: 11/9/2021     Years since quitting: 3.5    Smokeless tobacco: Current    Tobacco comments:     vaping   Vaping Use    Vaping status: Former   Substance Use Topics    Alcohol use: Yes     Comment: socially     Drug use: Yes     Types: Marijuana     Comment: Rarely     Alcohol Use: Not At Risk (4/2/2025)    Received from [facility]    AUDIT-C     Q1: How often do you have a drink containing alcohol?: 2-4 times a month     Q2: How many drinks containing alcohol do you have on a typical day when you are drinking?: 1 or 2     Q3: How often do you have six or more drinks on one occasion?: Never       Social History:    Social History     Socioeconomic History    Marital status: Single     Spouse name: Not on file    Number of children: Not on file    Years of education: Not on file    Highest education level: Not on file   Occupational History    Not on file   Other Topics Concern    Not on file   Social History Narrative    Non- smoker     No known smoke exposure     caffeine use - 2 cups per day     Alcohol Socially     Seatbelt use     Single     Illicit drug use - [facility] -  2 x month.    Lives with boyfriend     Number of siblings: 4    Relationship with siblings: good        Family Psychiatric History:     Family History[4]    Medical History Reviewed by provider this encounter:          Objective   There were no vitals taken for this visit.     Mental Status  Evaluation:    Appearance age appropriate, casually dressed   Behavior pleasant, cooperative, calm   Speech normal rate, normal volume, normal pitch, spontaneous   Mood euthymic   Affect normal range and intensity, appropriate   Thought Processes organized, goal directed, linear   Thought Content no overt delusions   Perceptual Disturbances: no auditory hallucinations, no visual hallucinations   Abnormal Thoughts  Risk Potential Suicidal ideation - None  Homicidal ideation - None  Potential for aggression - No   Orientation oriented to person, place, time/date, and situation   Memory recent and remote memory grossly intact   Consciousness alert and awake   Attention Span Concentration Span attention span and concentration are age appropriate   Intellect appears to be of average intelligence   Insight intact   Judgement intact   Muscle Strength and  Gait unable to assess today due to virtual visit   Motor activity no abnormal movements   Language no difficulty naming common objects, no difficulty repeating a phrase, no difficulty writing a sentence   Fund of Knowledge adequate knowledge of current events  adequate fund of knowledge regarding past history  adequate fund of knowledge regarding vocabulary        Laboratory Results: I have personally reviewed all pertinent laboratory/tests results    Recent Labs (last 6 months):   Office Visit on 04/05/2025   Component Date Value    Wound Culture 04/05/2025 Growth in Broth culture only Staphylococcus epidermidis (A)     Gram Stain Result 04/05/2025 No Polys or Bacteria seen        Suicide/Homicide Risk Assessment:    Risk of Harm to Self:  Based on today's assessment, Doris presents the following risk of harm to self: low    Risk of Harm to Others:  Based on today's assessment, Doris presents the following risk of harm to others: low    The following interventions are recommended: Continue medication management. Continue psychotherapy. No other intervention changes  indicated at this time.    Psychotherapy Provided:     Individual psychotherapy provided: Yes    Medication education provided to Doris.  Discussed with Doris recent stressor from her breakup and maintaining boundaries, engaging in hobbies and personal interests.  Importance of medication and treatment compliance reviewed with Doris.  Reassurance and supportive therapy provided.     Treatment Plan:    Completed and signed during the session: Not applicable - Treatment Plan not due at this session.    Goals: Progress towards Treatment Plan goals - Yes, progressing, as evidenced by subjective findings in HPI/Subjective Section and in Assessment and Plan Section    Depression Follow-up Plan Completed: Not applicable    Note Share:    This note was not shared with the patient due to reasonable likelihood of causing patient harm    Administrative Statements   Administrative Statements   Encounter provider Fabio Mills MD    The Patient is located at Home and in the following state in which I hold an active license [place].    The patient was identified by name and date of birth. Doris Burrell was informed that this is a telemedicine visit and that the visit is being conducted through the Epic Embedded platform. She agrees to proceed..  My office door was closed. No one else was in the room.  She acknowledged consent and understanding of privacy and security of the video platform. The patient has agreed to participate and understands they can discontinue the visit at any time.    I have spent a total time of 20 minutes in caring for this patient on the day of the visit/encounter including Risks and benefits of tx options, Instructions for management, Patient and family education, Importance of tx compliance, Risk factor reductions, Impressions, Counseling / Coordination of care, Documenting in the medical record, and Reviewing/placing orders in the medical record (including tests, medications, and/or procedures), not  "including the time spent for establishing the audio/video connection.    Visit Time  Visit Start Time: 11:10 AM  Visit Stop Time: 11:30 AM  Total Visit Duration: 20 minutes    Portions of the record may have been created with voice recognition software. Occasional wrong word or \"sound a like\" substitutions may have occurred due to the inherent limitations of voice recognition software. Read the chart carefully and recognize, using context, where substitutions have occurred.    Fabio Mills MD 05/27/25         [1]   Past Medical History:  Diagnosis Date    Acne     ADHD (attention deficit hyperactivity disorder)     Borderline personality disorder (HCC)     Depression     Fracture of carpal bones     Hypothyroidism     Hypothyroidism (acquired)     PTSD (post-traumatic stress disorder)     Suicide and self-inflicted injury (HCC) 11/2018    Suicide attempt (HCC)    [2]   Past Surgical History:  Procedure Laterality Date    WISDOM TOOTH EXTRACTION     [3] No Known Allergies  [4]   Family History  Problem Relation Name Age of Onset    ADD / ADHD Mother      Bipolar disorder Mother      Stroke Father      Hypertension Father      ADD / ADHD Father      Diabetes Father      ADD / ADHD Sister      ADD / ADHD Brother      ADD / ADHD Brother      Uterine cancer Maternal Grandmother      Cervical cancer Maternal Grandmother      Endometrial cancer Maternal Grandmother      Breast cancer Paternal Aunt      Bipolar disorder Paternal Uncle      Skin cancer Paternal Uncle      Hypertension Family      Thyroid disease Family      Cancer Family      Heart disease Family      Diabetes Family       "

## 2025-05-27 NOTE — ASSESSMENT & PLAN NOTE
The patient has a history of PTSD.  She has had prolonged and frequent traumas throughout her life and has struggled with previous nightmares, flashbacks, hypervigilance, and avoidance.  She has done cognitive processing therapy and has done quite well with this in the past.  Symptoms are currently fairly well-controlled.  As above, patient has re-established with prior therapist.    See plan for generalized anxiety disorder.

## 2025-05-27 NOTE — ASSESSMENT & PLAN NOTE
The patient has a history of generalized anxiety disorder.  Her symptoms do appear to be consistent with this.  She has had excessive worry, tenseness, difficulty controlling worrying, restless energy, prior history of skin picking behaviors and trichotillomania.  She has had overall improvement in her symptoms with therapy.  Symptoms currently appear to be fairly well-controlled though with residual symptoms of anxiety and panic at times.  She has re-established with prior therapist.    Continue individual psychotherapy.

## 2025-06-24 ENCOUNTER — TELEMEDICINE (OUTPATIENT)
Dept: PSYCHIATRY | Facility: CLINIC | Age: 25
End: 2025-06-24
Payer: COMMERCIAL

## 2025-06-24 DIAGNOSIS — F33.42 MAJOR DEPRESSIVE DISORDER, RECURRENT, IN FULL REMISSION (HCC): ICD-10-CM

## 2025-06-24 DIAGNOSIS — F41.1 GENERALIZED ANXIETY DISORDER: ICD-10-CM

## 2025-06-24 DIAGNOSIS — F90.2 ATTENTION DEFICIT HYPERACTIVITY DISORDER, COMBINED TYPE: Primary | ICD-10-CM

## 2025-06-24 DIAGNOSIS — F43.10 PTSD (POST-TRAUMATIC STRESS DISORDER): ICD-10-CM

## 2025-06-24 PROCEDURE — 98006 SYNCH AUDIO-VIDEO EST MOD 30: CPT | Performed by: STUDENT IN AN ORGANIZED HEALTH CARE EDUCATION/TRAINING PROGRAM

## 2025-06-24 RX ORDER — METHYLPHENIDATE HYDROCHLORIDE 36 MG/1
36 TABLET ORAL DAILY
Qty: 30 TABLET | Refills: 0 | Status: SHIPPED | OUTPATIENT
Start: 2025-06-24

## 2025-06-24 NOTE — ASSESSMENT & PLAN NOTE
The patient has a history of ADHD diagnosed during adolescence.  She has historically struggled with problems with sustained attention, hyperactivity, organization, task initiation and management, forgetfulness, impulsivity.  She has been in treatment for this for many years, though had been off of all medications for a few years with recent restarting in September 2024.  The patient had previously been managed with Adderall, though found that this made her more irritable and when we started on medication management was started on Concerta, with notable improvement in her tolerability. She has been noticing increased difficulty with staying on top of tasks lately. Will plan to increase dose of Concerta and work on improving systems in life to stay on top of tasks.    Increase Concerta to 36 mg daily.

## 2025-06-24 NOTE — PSYCH
MEDICATION MANAGEMENT NOTE    Name: Doris Burrell      : 2000      MRN: [id number]  Encounter Provider: Fabio Mills MD  Encounter Date: 2025   Encounter department: [facility]    Insurance: Payor: [employer] / Plan: [employer] PPO / Product Type: PPO /      Reason for Visit:   Chief Complaint   Patient presents with    Medication Management    Follow-up   :  Assessment & Plan  Attention deficit hyperactivity disorder, combined type  The patient has a history of ADHD diagnosed during adolescence.  She has historically struggled with problems with sustained attention, hyperactivity, organization, task initiation and management, forgetfulness, impulsivity.  She has been in treatment for this for many years, though had been off of all medications for a few years with recent restarting in 2024.  The patient had previously been managed with Adderall, though found that this made her more irritable and when we started on medication management was started on Concerta, with notable improvement in her tolerability. She has been noticing increased difficulty with staying on top of tasks lately. Will plan to increase dose of Concerta and work on improving systems in life to stay on top of tasks.    Increase Concerta to 36 mg daily.    Orders:    methylphenidate (CONCERTA) 36 MG ER tablet; Take 1 tablet (36 mg total) by mouth daily Max Daily Amount: 36 mg    Generalized anxiety disorder  The patient has a history of generalized anxiety disorder.  Her symptoms do appear to be consistent with this.  She has had excessive worry, tenseness, difficulty controlling worrying, restless energy, prior history of skin picking behaviors and trichotillomania.  She has had overall improvement in her symptoms with therapy.  Symptoms currently appear to be fairly well-controlled though with residual symptoms of anxiety and panic at times.  She has re-established with prior  therapist.    Continue individual psychotherapy.         Major depressive disorder, recurrent, in full remission (HCC)  The patient has a history that does appear to be consistent with major depressive disorder.  She is previously had prolonged episodes of low mood, decreased interest, low energy, feelings of guilt, and suicidal thoughts.  Her symptoms appear to be very well-controlled and have improved dramatically with therapy.  She has been struggling more with energy and motivation secondary to exhaustion from her work schedule. However, she denies feeling depressed at this time and feels her mood remains good and well controlled.    See plan for generalized anxiety disorder.         PTSD (post-traumatic stress disorder)  The patient has a history of PTSD.  She has had prolonged and frequent traumas throughout her life and has struggled with previous nightmares, flashbacks, hypervigilance, and avoidance.  She has done cognitive processing therapy and has done quite well with this in the past.  Symptoms are currently fairly well-controlled.  As above, patient has re-established with prior therapist.    See plan for generalized anxiety disorder.             Treatment Recommendations:    Educated about diagnosis and treatment modalities. Verbalizes understanding and agreement with the treatment plan.  Discussed self monitoring of symptoms, and symptom monitoring tools.  Discussed medications and if treatment adjustment was needed or desired.  Medication management every 4 weeks  Aware of 24 hour and weekend coverage for urgent situations accessed by calling [facility] main practice number  I am scheduling this patient out for greater than 3 months: No    Medications Risks/Benefits:      Risks, Benefits And Possible Side Effects Of Medications:    Risks, benefits, and possible side effects of medications explained to Doris and she (or legal representative) verbalizes understanding and  agreement for treatment.    Controlled Medication Discussion:     Doris has been filling controlled prescriptions on time as prescribed according to [place] Prescription Drug Monitoring Program.      History of Present Illness     This patient presents for medication management for ADHD, generalized anxiety disorder, major depressive disorder, and PTSD.  At last visit, Concerta was continued at 27 mg daily.  The patient reports that she has been doing overall well since last visit.  She notes that she has found Concerta overall has been quite helpful.  However, work has been very difficult with a fairly demanding schedule and frequent travel across country.  She finds that this has left her struggling to maintain tasks at home when she is home and but she is having lower energy and less motivation to accomplish tasks once she is home.  She does feel that she is effectively managing her work.  Discussed incorporating additional help and support to reduce her task burden at home.  Patient reports that otherwise her mood has been good and she has not been feeling depressed at all.  She feels that she is able to take time off from her job after speaking with her supervisor, which has been supportive.  She denies any problems with anxiety lately.  She feels that her anxiety has overall been progressing well and she continues to work with her therapist.  She denies any SI, HI, or AVH.  Appetite remains fair. Discussed adjusting her sleep schedule to help feel more rested and improve ability to get started with her day.  She denies any problems tolerating her current medication regimen.  Patient is amenable to slight increase in Concerta to 36 mg daily.  She denies any other questions or concerns at this time.    Review Of Systems: A review of systems is obtained and is negative except for the pertinent positives listed in HPI/Subjective above.      Current Rating Scores:     Current PHQ-9   PHQ-2/9 Depression  Screening           Current MARCI-7     Areas of Improvement: reviewed in HPI/Subjective Section and reviewed in Assessment and Plan Section      Past Medical History[1]  Past Surgical History[2]  Allergies: Allergies[3]    Current Outpatient Medications   Medication Instructions    levothyroxine 50 mcg, Oral, Daily    methylphenidate (CONCERTA) 36 mg, Oral, Daily    mupirocin (BACTROBAN) 2 % ointment apply to affected area twice a day for 7 days    PARAGARD INTRAUTERINE COPPER IU 1 Device, Continuous        Substance Abuse History:    Tobacco, Alcohol and Drug Use History     Tobacco Use    Smoking status: Former     Current packs/day: 0.00     Types: Cigarettes     Quit date: 11/9/2021     Years since quitting: 3.6    Smokeless tobacco: Current    Tobacco comments:     vaping   Vaping Use    Vaping status: Former   Substance Use Topics    Alcohol use: Yes     Comment: socially     Drug use: Yes     Types: Marijuana     Comment: Rarely     Alcohol Use: Not At Risk (4/2/2025)    Received from [facility]    AUDIT-C     Q1: How often do you have a drink containing alcohol?: 2-4 times a month     Q2: How many drinks containing alcohol do you have on a typical day when you are drinking?: 1 or 2     Q3: How often do you have six or more drinks on one occasion?: Never       Social History:    Social History     Socioeconomic History    Marital status: Single     Spouse name: Not on file    Number of children: Not on file    Years of education: Not on file    Highest education level: Not on file   Occupational History    Not on file   Other Topics Concern    Not on file   Social History Narrative    Non- smoker     No known smoke exposure     caffeine use - 2 cups per day     Alcohol Socially     Seatbelt use     Single     Illicit drug use - mariajuana -  2 x month.    Lives with boyfriend     Number of siblings: 4    Relationship with siblings: good        Family Psychiatric History:     Family History[4]    Medical  "History Reviewed by provider this encounter:          Objective   There were no vitals taken for this visit.     Mental Status Evaluation:    Appearance age appropriate, casually dressed   Behavior pleasant, cooperative, calm   Speech normal rate, normal volume, normal pitch, spontaneous   Mood \"Good\"   Affect normal range and intensity, appropriate   Thought Processes organized, goal directed, linear   Thought Content no overt delusions   Perceptual Disturbances: no auditory hallucinations, no visual hallucinations   Abnormal Thoughts  Risk Potential Suicidal ideation - None  Homicidal ideation - None  Potential for aggression - No   Orientation oriented to person, place, time/date, and situation   Memory recent and remote memory grossly intact   Consciousness alert and awake   Attention Span Concentration Span attention span and concentration are age appropriate   Intellect appears to be of average intelligence   Insight intact   Judgement intact   Muscle Strength and  Gait unable to assess today due to virtual visit   Motor activity no abnormal movements   Language no difficulty naming common objects, no difficulty repeating a phrase, no difficulty writing a sentence   Fund of Knowledge adequate knowledge of current events  adequate fund of knowledge regarding past history  adequate fund of knowledge regarding vocabulary        Laboratory Results: I have personally reviewed all pertinent laboratory/tests results    Recent Labs (last 6 months):   Office Visit on 04/05/2025   Component Date Value    Wound Culture 04/05/2025 Growth in Broth culture only Staphylococcus epidermidis (A)     Gram Stain Result 04/05/2025 No Polys or Bacteria seen        Suicide/Homicide Risk Assessment:    Risk of Harm to Self:  Based on today's assessment, Doris presents the following risk of harm to self: low    Risk of Harm to Others:  Based on today's assessment, Doris presents the following risk of harm to others: low    The " following interventions are recommended: Continue medication management. Continue psychotherapy. No other intervention changes indicated at this time.    Psychotherapy Provided:     Individual psychotherapy provided: Yes    Medication education provided to Doris.  Discussed with Doris recent stress from work and managing life tasks at home, improvement in sleep hygiene.  Importance of medication and treatment compliance reviewed with Doris.  Reassurance and supportive therapy provided.     Treatment Plan:    Completed and signed during the session: Not applicable - Treatment Plan not due at this session.    Goals: Progress towards Treatment Plan goals - Yes, progressing, as evidenced by subjective findings in HPI/Subjective Section and in Assessment and Plan Section    Depression Follow-up Plan Completed: Not applicable    Note Share:    This note was not shared with the patient due to reasonable likelihood of causing patient harm    Administrative Statements   Administrative Statements   Encounter provider Fabio Mills MD    The Patient is located at Home and in the following state in which I hold an active license [place].    The patient was identified by name and date of birth. Doris Burrell was informed that this is a telemedicine visit and that the visit is being conducted through the Epic Embedded platform. She agrees to proceed..  My office door was closed. No one else was in the room.  She acknowledged consent and understanding of privacy and security of the video platform. The patient has agreed to participate and understands they can discontinue the visit at any time.    I have spent a total time of 20 minutes in caring for this patient on the day of the visit/encounter including Risks and benefits of tx options, Instructions for management, Patient and family education, Importance of tx compliance, Risk factor reductions, Impressions, Counseling / Coordination of care, Documenting in the medical  "record, and Reviewing/placing orders in the medical record (including tests, medications, and/or procedures), not including the time spent for establishing the audio/video connection.    Visit Time  Visit Start Time: 11:20 AM  Visit Stop Time: 11:40 AM  Total Visit Duration: 20 minutes    Portions of the record may have been created with voice recognition software. Occasional wrong word or \"sound a like\" substitutions may have occurred due to the inherent limitations of voice recognition software. Read the chart carefully and recognize, using context, where substitutions have occurred.    Fabio Mills MD 06/24/25         [1]   Past Medical History:  Diagnosis Date    Acne     ADHD (attention deficit hyperactivity disorder)     Borderline personality disorder (HCC)     Depression     Fracture of carpal bones     Hypothyroidism     Hypothyroidism (acquired)     PTSD (post-traumatic stress disorder)     Suicide and self-inflicted injury (HCC) 11/2018    Suicide attempt (HCC)    [2]   Past Surgical History:  Procedure Laterality Date    WISDOM TOOTH EXTRACTION     [3] No Known Allergies  [4]   Family History  Problem Relation Name Age of Onset    ADD / ADHD Mother      Bipolar disorder Mother      Stroke Father      Hypertension Father      ADD / ADHD Father      Diabetes Father      ADD / ADHD Sister      ADD / ADHD Brother      ADD / ADHD Brother      Uterine cancer Maternal Grandmother      Cervical cancer Maternal Grandmother      Endometrial cancer Maternal Grandmother      Breast cancer Paternal Aunt      Bipolar disorder Paternal Uncle      Skin cancer Paternal Uncle      Hypertension Family      Thyroid disease Family      Cancer Family      Heart disease Family      Diabetes Family       "

## 2025-06-24 NOTE — ASSESSMENT & PLAN NOTE
The patient has a history that does appear to be consistent with major depressive disorder.  She is previously had prolonged episodes of low mood, decreased interest, low energy, feelings of guilt, and suicidal thoughts.  Her symptoms appear to be very well-controlled and have improved dramatically with therapy.  She has been struggling more with energy and motivation secondary to exhaustion from her work schedule. However, she denies feeling depressed at this time and feels her mood remains good and well controlled.    See plan for generalized anxiety disorder.

## 2025-07-21 PROCEDURE — 88305 TISSUE EXAM BY PATHOLOGIST: CPT | Performed by: PATHOLOGY

## 2025-07-22 ENCOUNTER — TELEMEDICINE (OUTPATIENT)
Dept: PSYCHIATRY | Facility: CLINIC | Age: 25
End: 2025-07-22
Payer: COMMERCIAL

## 2025-07-22 DIAGNOSIS — F41.1 GENERALIZED ANXIETY DISORDER: ICD-10-CM

## 2025-07-22 DIAGNOSIS — F33.42 MAJOR DEPRESSIVE DISORDER, RECURRENT, IN FULL REMISSION (HCC): ICD-10-CM

## 2025-07-22 DIAGNOSIS — F43.10 PTSD (POST-TRAUMATIC STRESS DISORDER): ICD-10-CM

## 2025-07-22 DIAGNOSIS — F90.2 ATTENTION DEFICIT HYPERACTIVITY DISORDER, COMBINED TYPE: Primary | ICD-10-CM

## 2025-07-22 PROCEDURE — 98006 SYNCH AUDIO-VIDEO EST MOD 30: CPT | Performed by: STUDENT IN AN ORGANIZED HEALTH CARE EDUCATION/TRAINING PROGRAM

## 2025-07-22 RX ORDER — METHYLPHENIDATE HYDROCHLORIDE 36 MG/1
36 TABLET ORAL DAILY
Qty: 30 TABLET | Refills: 0 | Status: SHIPPED | OUTPATIENT
Start: 2025-08-20

## 2025-07-22 RX ORDER — METHYLPHENIDATE HYDROCHLORIDE 36 MG/1
36 TABLET ORAL DAILY
Qty: 30 TABLET | Refills: 0 | Status: SHIPPED | OUTPATIENT
Start: 2025-07-22

## 2025-07-22 NOTE — ASSESSMENT & PLAN NOTE
The patient has a history of generalized anxiety disorder.  Her symptoms do appear to be consistent with this.  She has had excessive worry, tenseness, difficulty controlling worrying, restless energy, prior history of skin picking behaviors and trichotillomania.  She has had overall improvement in her symptoms with therapy.  Symptoms currently appear to be fairly well-controlled though with residual symptoms of anxiety and panic at times.  She has re-established with prior therapist.  Symptoms of early improved with improved control of ADHD symptoms.  Symptoms currently appear to be stable.    Continue individual psychotherapy.

## 2025-07-22 NOTE — ASSESSMENT & PLAN NOTE
The patient has a history of ADHD diagnosed during adolescence.  She has historically struggled with problems with sustained attention, hyperactivity, organization, task initiation and management, forgetfulness, impulsivity.  She has been in treatment for this for many years, though had been off of all medications for a few years with recent restarting in September 2024.  The patient had previously been managed with Adderall, though found that this made her more irritable and when we started on medication management was started on Concerta, with notable improvement in her tolerability.  Symptoms currently appear to be controlled and stable.  We will continue to monitor for any further changes as needed.    Continue Concerta 36 mg daily.

## 2025-07-22 NOTE — ASSESSMENT & PLAN NOTE
The patient has a history that does appear to be consistent with major depressive disorder.  She is previously had prolonged episodes of low mood, decreased interest, low energy, feelings of guilt, and suicidal thoughts.  Her symptoms appear to be very well-controlled and have improved significantly with therapy.  Patient notes that symptoms do tend to occur about ice per year, in the summer and then in the winter, during both of these times of year when she has increased difficulty being outside.  Currently, she feels that her symptoms remain generally well controlled and improving with therapy still.  She prefers to avoid any antidepressant medications.  She denies any SI, HI, or AVH.  She is able to appropriately plan for safety.  She does not demonstrate an intention to her safety or to the safety of others at this time.  She will continue with individual therapy.    See plan for generalized anxiety disorder.

## 2025-07-22 NOTE — PSYCH
MEDICATION MANAGEMENT NOTE    Name: Doris Burrell      : 2000      MRN: [id number]  Encounter Provider: Fabio Mills MD  Encounter Date: 2025   Encounter department: [facility]    Insurance: Payor: [employer] / Plan: [employer] PPO / Product Type: PPO /      Reason for Visit:   Chief Complaint   Patient presents with    Follow-up    Medication Management   :  Assessment & Plan  Attention deficit hyperactivity disorder, combined type  The patient has a history of ADHD diagnosed during adolescence.  She has historically struggled with problems with sustained attention, hyperactivity, organization, task initiation and management, forgetfulness, impulsivity.  She has been in treatment for this for many years, though had been off of all medications for a few years with recent restarting in 2024.  The patient had previously been managed with Adderall, though found that this made her more irritable and when we started on medication management was started on Concerta, with notable improvement in her tolerability.  Symptoms currently appear to be controlled and stable.  We will continue to monitor for any further changes as needed.    Continue Concerta 36 mg daily.    Orders:    methylphenidate (CONCERTA) 36 MG ER tablet; Take 1 tablet (36 mg total) by mouth daily Max Daily Amount: 36 mg    methylphenidate (CONCERTA) 36 MG ER tablet; Take 1 tablet (36 mg total) by mouth daily Max Daily Amount: 36 mg Do not start before 2025.    Generalized anxiety disorder  The patient has a history of generalized anxiety disorder.  Her symptoms do appear to be consistent with this.  She has had excessive worry, tenseness, difficulty controlling worrying, restless energy, prior history of skin picking behaviors and trichotillomania.  She has had overall improvement in her symptoms with therapy.  Symptoms currently appear to be fairly well-controlled though with residual  symptoms of anxiety and panic at times.  She has re-established with prior therapist.  Symptoms of early improved with improved control of ADHD symptoms.  Symptoms currently appear to be stable.    Continue individual psychotherapy.         Major depressive disorder, recurrent, in full remission (HCC)  The patient has a history that does appear to be consistent with major depressive disorder.  She is previously had prolonged episodes of low mood, decreased interest, low energy, feelings of guilt, and suicidal thoughts.  Her symptoms appear to be very well-controlled and have improved significantly with therapy.  Patient notes that symptoms do tend to occur about ice per year, in the summer and then in the winter, during both of these times of year when she has increased difficulty being outside.  Currently, she feels that her symptoms remain generally well controlled and improving with therapy still.  She prefers to avoid any antidepressant medications.  She denies any SI, HI, or AVH.  She is able to appropriately plan for safety.  She does not demonstrate an intention to her safety or to the safety of others at this time.  She will continue with individual therapy.    See plan for generalized anxiety disorder.         PTSD (post-traumatic stress disorder)  The patient has a history of PTSD.  She has had prolonged and frequent traumas throughout her life and has struggled with previous nightmares, flashbacks, hypervigilance, and avoidance.  She has done cognitive processing therapy and has done quite well with this in the past.  Symptoms are currently fairly well-controlled.  As above, patient has re-established with prior therapist.    See plan for generalized anxiety disorder.             Treatment Recommendations:    Educated about diagnosis and treatment modalities. Verbalizes understanding and agreement with the treatment plan.  Discussed self monitoring of symptoms, and symptom monitoring tools.  Discussed  medications and if treatment adjustment was needed or desired.  Medication management every 4 weeks  Aware of 24 hour and weekend coverage for urgent situations accessed by calling [facility] main practice number  I am scheduling this patient out for greater than 3 months: No    Medications Risks/Benefits:      Risks, Benefits And Possible Side Effects Of Medications:    Risks, benefits, and possible side effects of medications explained to Doris and she (or legal representative) verbalizes understanding and agreement for treatment.    Controlled Medication Discussion:     Doris has been filling controlled prescriptions on time as prescribed according to [place] Prescription Drug Monitoring Program.      History of Present Illness     This patient presents for medication management for ADHD, generalized anxiety disorder, major depressive disorder, and PTSD.  At last visit, Concerta was increased to 36 mg daily.  She has overall felt a positive improvement since increasing this dose.  She reports that she has noticed a decrease in appetite but is making sure that she is eating sufficiently.  She feels that she is adjusting to this. She is otherwise tolerating this dose well. She has noticed a significant improvement in her productivity and ability to maintain attention. She has been dealing with frustrations with self-esteem after having been in a relationship with someone several months ago who had cheated on her. Also she reports that she has been feeling lonely as her family lives far but would like to see them soon. Patient notes that she typically experiences symptoms of depression more pronounced around seasons where she has difficulty being outside, including summer and winter.  Patient notes that while she does have some intermittent feelings of depression, she feels that these are overall well controlled.  She is pleased with the progress she has made in therapy. She does prefer  to avoid any antidepressants at this time.  She denies any SI, HI, or AVH.  She is able to appropriately plan for safety.  We will continue current medication regimen of Concerta 6 mg.  Patient expressed understanding and was in agreement this plan.     Review Of Systems: A review of systems is obtained and is negative except for the pertinent positives listed in HPI/Subjective above.      Current Rating Scores:     Current PHQ-9   PHQ-2/9 Depression Screening           Current MARCI-7     Areas of Improvement: reviewed in HPI/Subjective Section and reviewed in Assessment and Plan Section      Past Medical History[1]  Past Surgical History[2]  Allergies: Allergies[3]    Current Outpatient Medications   Medication Instructions    levothyroxine 50 mcg, Oral, Daily    methylphenidate (CONCERTA) 36 mg, Oral, Daily    [START ON 8/20/2025] methylphenidate (CONCERTA) 36 mg, Oral, Daily    mupirocin (BACTROBAN) 2 % ointment apply to affected area twice a day for 7 days    PARAGARD INTRAUTERINE COPPER IU 1 Device, Continuous        Substance Abuse History:    Tobacco, Alcohol and Drug Use History     Tobacco Use    Smoking status: Former     Current packs/day: 0.00     Types: Cigarettes     Quit date: 11/9/2021     Years since quitting: 3.7    Smokeless tobacco: Current    Tobacco comments:     vaping   Vaping Use    Vaping status: Former   Substance Use Topics    Alcohol use: Yes     Comment: socially     Drug use: Yes     Types: Marijuana     Comment: Rarely     Alcohol Use: Not At Risk (4/2/2025)    Received from [facility]    AUDIT-C     Q1: How often do you have a drink containing alcohol?: 2-4 times a month     Q2: How many drinks containing alcohol do you have on a typical day when you are drinking?: 1 or 2     Q3: How often do you have six or more drinks on one occasion?: Never       Social History:    Social History     Socioeconomic History    Marital status: Single     Spouse name: Not on file    Number of  children: Not on file    Years of education: Not on file    Highest education level: Not on file   Occupational History    Not on file   Other Topics Concern    Not on file   Social History Narrative    Non- smoker     No known smoke exposure     caffeine use - 2 cups per day     Alcohol Socially     Seatbelt use     Single     Illicit drug use - mariajuana -  2 x month.    Lives with boyfriend     Number of siblings: 4    Relationship with siblings: good        Family Psychiatric History:     Family History[4]    Medical History Reviewed by provider this encounter:          Objective   There were no vitals taken for this visit.     Mental Status Evaluation:    Appearance age appropriate, casually dressed   Behavior pleasant, cooperative, calm   Speech normal rate, normal volume, normal pitch, spontaneous   Mood euthymic   Affect normal range and intensity, appropriate   Thought Processes organized, goal directed, linear   Thought Content no overt delusions   Perceptual Disturbances: no auditory hallucinations, no visual hallucinations   Abnormal Thoughts  Risk Potential Suicidal ideation - None  Homicidal ideation - None  Potential for aggression - No   Orientation oriented to person, place, time/date, and situation   Memory recent and remote memory grossly intact   Consciousness alert and awake   Attention Span Concentration Span attention span and concentration are age appropriate   Intellect appears to be of average intelligence   Insight intact   Judgement intact   Muscle Strength and  Gait unable to assess today due to virtual visit   Motor activity no abnormal movements   Language no difficulty naming common objects, no difficulty repeating a phrase, no difficulty writing a sentence   Fund of Knowledge adequate knowledge of current events  adequate fund of knowledge regarding past history  adequate fund of knowledge regarding vocabulary        Laboratory Results: I have personally reviewed all pertinent  laboratory/tests results    Recent Labs (last 6 months):   Office Visit on 04/05/2025   Component Date Value    Wound Culture 04/05/2025 Growth in Broth culture only Staphylococcus epidermidis (A)     Gram Stain Result 04/05/2025 No Polys or Bacteria seen        Suicide/Homicide Risk Assessment:    Risk of Harm to Self:  Based on today's assessment, Doris presents the following risk of harm to self: low    Risk of Harm to Others:  Based on today's assessment, Doris presents the following risk of harm to others: low    The following interventions are recommended: Continue medication management. Continue psychotherapy. No other intervention changes indicated at this time.    Psychotherapy Provided:     Individual psychotherapy provided: No    Treatment Plan:    Completed and signed during the session: Not applicable - Treatment Plan not due at this session.    Goals: Progress towards Treatment Plan goals - Yes, progressing, as evidenced by subjective findings in HPI/Subjective Section and in Assessment and Plan Section    Depression Follow-up Plan Completed: Not applicable    Note Share:    This note was not shared with the patient due to reasonable likelihood of causing patient harm    Administrative Statements   Administrative Statements   Encounter provider Fabio Mills MD    The Patient is located at Home and in the following state in which I hold an active license [place].    The patient was identified by name and date of birth. Doris Burrell was informed that this is a telemedicine visit and that the visit is being conducted through the Epic Embedded platform. She agrees to proceed..  My office door was closed. No one else was in the room.  She acknowledged consent and understanding of privacy and security of the video platform. The patient has agreed to participate and understands they can discontinue the visit at any time.    I have spent a total time of 20 minutes in caring for this patient on the day of  "the visit/encounter including Risks and benefits of tx options, Instructions for management, Patient and family education, Importance of tx compliance, Risk factor reductions, Impressions, Counseling / Coordination of care, Documenting in the medical record, and Reviewing/placing orders in the medical record (including tests, medications, and/or procedures), not including the time spent for establishing the audio/video connection.    Visit Time  Visit Start Time: 1:00 PM  Visit Stop Time: 1:20 PM  Total Visit Duration: 20 minutes    Portions of the record may have been created with voice recognition software. Occasional wrong word or \"sound a like\" substitutions may have occurred due to the inherent limitations of voice recognition software. Read the chart carefully and recognize, using context, where substitutions have occurred.    Fabio Mills MD 07/22/25         [1]   Past Medical History:  Diagnosis Date    Acne 1/1/2014    ADHD (attention deficit hyperactivity disorder) 2014    Allergic     Anxiety 2013    Asthma Childhood    Borderline personality disorder (HCC) 2014    Depression 2013    Disease of thyroid gland 2016    Fracture of carpal bones     Hallucination Last year    Yes    Hypothyroidism 2016    Hypothyroidism (acquired)     Psychosis (HCC) 2020    PTSD (post-traumatic stress disorder) 2018    Self-injurious behavior 13 years old    Suicide and self-inflicted injury (HCC) 11/2018    Suicide attempt ([facility]) 2013   [2]   Past Surgical History:  Procedure Laterality Date    DENTAL SURGERY  2016    WISDOM TOOTH EXTRACTION     [3] No Known Allergies  [4]   Family History  Problem Relation Name Age of Onset    ADD / ADHD Mother Henna neuer     Bipolar disorder Mother Henna neuer     Cancer Mother Henna neuer         Colon cancer    Alcohol abuse Mother Henna neuer     Suicide Attempts Mother Henna neuer     Acne Mother Henna neuer     Stroke Father Montana randhawa     Hypertension Father Montana randhawa     ADD " / ADHD Father Montana randhawa     Diabetes Father Montana randhawa     ADD / ADHD Sister      ADD / ADHD Brother Dallas randhawa     ADD / ADHD Brother Ming randhawa     Uterine cancer Maternal Grandmother Aris myra     Cervical cancer Maternal Grandmother Aris myra     Endometrial cancer Maternal Grandmother Arischristian renteria     Cancer Maternal Grandmother Aris myra         Uterine cancer    Breast cancer Paternal Aunt      Bipolar disorder Paternal Uncle Robinson randhawa     Skin cancer Paternal Uncle      Hypertension Family      Thyroid disease Family      Cancer Family      Heart disease Family      Diabetes Family      Cancer Paternal Aunt Jer randhawa